# Patient Record
Sex: MALE | Race: WHITE | NOT HISPANIC OR LATINO | Employment: UNEMPLOYED | URBAN - NONMETROPOLITAN AREA
[De-identification: names, ages, dates, MRNs, and addresses within clinical notes are randomized per-mention and may not be internally consistent; named-entity substitution may affect disease eponyms.]

---

## 2022-04-19 ENCOUNTER — HOSPITAL ENCOUNTER (EMERGENCY)
Facility: HOSPITAL | Age: 57
Discharge: HOME | End: 2022-04-19
Attending: EMERGENCY MEDICINE | Admitting: EMERGENCY MEDICINE
Payer: COMMERCIAL

## 2022-04-19 ENCOUNTER — APPOINTMENT (EMERGENCY)
Dept: RADIOLOGY | Facility: HOSPITAL | Age: 57
End: 2022-04-19
Payer: COMMERCIAL

## 2022-04-19 VITALS
WEIGHT: 144.62 LBS | TEMPERATURE: 98 F | RESPIRATION RATE: 21 BRPM | HEART RATE: 84 BPM | DIASTOLIC BLOOD PRESSURE: 67 MMHG | OXYGEN SATURATION: 100 % | SYSTOLIC BLOOD PRESSURE: 92 MMHG | HEIGHT: 66 IN | BODY MASS INDEX: 23.24 KG/M2

## 2022-04-19 DIAGNOSIS — R06.00 DYSPNEA, UNSPECIFIED TYPE: Primary | ICD-10-CM

## 2022-04-19 LAB
ALBUMIN SERPL BCP-MCNC: 3 G/DL (ref 3.5–5)
ALP SERPL-CCNC: 86 U/L (ref 46–116)
ALT SERPL W P-5'-P-CCNC: 42 U/L (ref 12–78)
ANION GAP SERPL CALCULATED.3IONS-SCNC: 12 MMOL/L (ref 4–13)
AST SERPL W P-5'-P-CCNC: 22 U/L (ref 5–45)
BASOPHILS # BLD AUTO: 0.05 THOUSANDS/ΜL (ref 0–0.1)
BASOPHILS NFR BLD AUTO: 1 % (ref 0–1)
BILIRUB SERPL-MCNC: 0.47 MG/DL (ref 0.2–1)
BUN SERPL-MCNC: 14 MG/DL (ref 5–25)
CALCIUM ALBUM COR SERPL-MCNC: 9.8 MG/DL (ref 8.3–10.1)
CALCIUM SERPL-MCNC: 9 MG/DL (ref 8.3–10.1)
CARDIAC TROPONIN I PNL SERPL HS: 2441 NG/L
CHLORIDE SERPL-SCNC: 102 MMOL/L (ref 100–108)
CO2 SERPL-SCNC: 23 MMOL/L (ref 21–32)
CREAT SERPL-MCNC: 0.84 MG/DL (ref 0.6–1.3)
CRP SERPL QL: 42.4 MG/L
EOSINOPHIL # BLD AUTO: 0.1 THOUSAND/ΜL (ref 0–0.61)
EOSINOPHIL NFR BLD AUTO: 1 % (ref 0–6)
ERYTHROCYTE [DISTWIDTH] IN BLOOD BY AUTOMATED COUNT: 13 % (ref 11.6–15.1)
ERYTHROCYTE [SEDIMENTATION RATE] IN BLOOD: 33 MM/HOUR (ref 0–19)
GFR SERPL CREATININE-BSD FRML MDRD: 99 ML/MIN/1.73SQ M
GLUCOSE SERPL-MCNC: 91 MG/DL (ref 65–140)
HCT VFR BLD AUTO: 40.8 % (ref 36.5–49.3)
HGB BLD-MCNC: 12.9 G/DL (ref 12–17)
IMM GRANULOCYTES # BLD AUTO: 0.05 THOUSAND/UL (ref 0–0.2)
IMM GRANULOCYTES NFR BLD AUTO: 1 % (ref 0–2)
LYMPHOCYTES # BLD AUTO: 1.9 THOUSANDS/ΜL (ref 0.6–4.47)
LYMPHOCYTES NFR BLD AUTO: 24 % (ref 14–44)
MCH RBC QN AUTO: 28.2 PG (ref 26.8–34.3)
MCHC RBC AUTO-ENTMCNC: 31.6 G/DL (ref 31.4–37.4)
MCV RBC AUTO: 89 FL (ref 82–98)
MONOCYTES # BLD AUTO: 0.68 THOUSAND/ΜL (ref 0.17–1.22)
MONOCYTES NFR BLD AUTO: 9 % (ref 4–12)
NEUTROPHILS # BLD AUTO: 5.12 THOUSANDS/ΜL (ref 1.85–7.62)
NEUTS SEG NFR BLD AUTO: 64 % (ref 43–75)
NRBC BLD AUTO-RTO: 0 /100 WBCS
NT-PROBNP SERPL-MCNC: 2752 PG/ML
PLATELET # BLD AUTO: 311 THOUSANDS/UL (ref 149–390)
PMV BLD AUTO: 9.7 FL (ref 8.9–12.7)
POTASSIUM SERPL-SCNC: 4.4 MMOL/L (ref 3.5–5.3)
PROT SERPL-MCNC: 7.2 G/DL (ref 6.4–8.2)
RBC # BLD AUTO: 4.58 MILLION/UL (ref 3.88–5.62)
SODIUM SERPL-SCNC: 137 MMOL/L (ref 136–145)
WBC # BLD AUTO: 7.9 THOUSAND/UL (ref 4.31–10.16)

## 2022-04-19 PROCEDURE — 85025 COMPLETE CBC W/AUTO DIFF WBC: CPT | Performed by: EMERGENCY MEDICINE

## 2022-04-19 PROCEDURE — 99285 EMERGENCY DEPT VISIT HI MDM: CPT | Performed by: EMERGENCY MEDICINE

## 2022-04-19 PROCEDURE — 93005 ELECTROCARDIOGRAM TRACING: CPT

## 2022-04-19 PROCEDURE — 99285 EMERGENCY DEPT VISIT HI MDM: CPT

## 2022-04-19 PROCEDURE — 85652 RBC SED RATE AUTOMATED: CPT | Performed by: EMERGENCY MEDICINE

## 2022-04-19 PROCEDURE — 36415 COLL VENOUS BLD VENIPUNCTURE: CPT | Performed by: EMERGENCY MEDICINE

## 2022-04-19 PROCEDURE — 84484 ASSAY OF TROPONIN QUANT: CPT | Performed by: EMERGENCY MEDICINE

## 2022-04-19 PROCEDURE — 83880 ASSAY OF NATRIURETIC PEPTIDE: CPT | Performed by: EMERGENCY MEDICINE

## 2022-04-19 PROCEDURE — 86140 C-REACTIVE PROTEIN: CPT | Performed by: EMERGENCY MEDICINE

## 2022-04-19 PROCEDURE — 80053 COMPREHEN METABOLIC PANEL: CPT | Performed by: EMERGENCY MEDICINE

## 2022-04-19 PROCEDURE — 71045 X-RAY EXAM CHEST 1 VIEW: CPT

## 2022-04-19 RX ORDER — ATORVASTATIN CALCIUM 80 MG/1
80 TABLET, FILM COATED ORAL DAILY
Status: ON HOLD | COMMUNITY
Start: 2022-04-12

## 2022-04-19 RX ORDER — ERGOCALCIFEROL (VITAMIN D2) 1250 MCG
50000 CAPSULE ORAL WEEKLY
COMMUNITY
Start: 2022-04-17 | End: 2022-08-06

## 2022-04-19 RX ORDER — METOPROLOL SUCCINATE 25 MG/1
25 TABLET, EXTENDED RELEASE ORAL DAILY
Status: ON HOLD | COMMUNITY
Start: 2022-04-13

## 2022-04-19 RX ORDER — CLOPIDOGREL BISULFATE 75 MG/1
75 TABLET ORAL DAILY
Status: ON HOLD | COMMUNITY
Start: 2022-04-13

## 2022-04-19 RX ORDER — WARFARIN SODIUM 5 MG/1
5 TABLET ORAL DAILY
Status: ON HOLD | COMMUNITY
Start: 2022-04-12

## 2022-04-19 NOTE — QUICK NOTE
Pt evaluated at the bedside  Labs stable  Renal function stable  No events on telemetry  Symptoms improved and BP slightly improved since seen in clinic today  CXR without significant pleural effusions and improved since most recent hospitalization  Will plan for discharge with set up for OP R[facility]  If he worsens in any way he is aware to seek medical attention immediately  Case discussed with Dr Ibeth Pimentel who agreed  ED doctor aware      Yonis Ott PA-C  4/19/2022

## 2022-04-19 NOTE — ED PROVIDER NOTES
History  Chief Complaint   Patient presents with    Shortness of Breath     pt c/o ongoing sob for past 9 days since 2 stent placement  pt seen cardiologist and instructed to come to ED per further evaluation due to ekg done in office today  denies c/p, cough, fevers, n/v/d     Sent by Cardiology for evaluation of possible pericarditis  Patient had STEMI on 04/10 following which she had PCI of LAD and the left circumflex  Patient has EF of 25% and is on Coumadin  Patient continues to complain shortness of breath, no different than when he left the hospital after his STEMI  He does not complain of chest pain  He does not complain of shortness of breath at rest   However he does complain of shortness of breath with exertion  History provided by:  Patient   used: No    Shortness of Breath  Severity:  Moderate  Onset quality:  Gradual  Timing:  Constant  Progression:  Unchanged  Chronicity:  New  Context comment:  Recent MI and stenting  Relieved by:  Nothing  Worsened by:  Nothing  Ineffective treatments:  None tried  Associated symptoms: no abdominal pain, no chest pain, no claudication, no cough, no ear pain, no fever, no headaches, no hemoptysis, no neck pain, no rash, no sore throat, no sputum production, no vomiting and no wheezing        Prior to Admission Medications   Prescriptions Last Dose Informant Patient Reported?  Taking?   atorvastatin (LIPITOR) 80 mg tablet 4/18/2022 at Unknown time  Yes Yes   Sig: Take 80 mg by mouth daily   clopidogrel (PLAVIX) 75 mg tablet 4/19/2022 at Unknown time  Yes Yes   Sig: Take 75 mg by mouth daily   ergocalciferol (ERGOCALCIFEROL) 1 25 MG (08165 UT) capsule Past Week at Unknown time  Yes Yes   Sig: Take 50,000 Units by mouth once a week   metoprolol succinate (TOPROL-XL) 25 mg 24 hr tablet 4/19/2022 at Unknown time  Yes Yes   Sig: Take 25 mg by mouth daily   warfarin (COUMADIN) 5 mg tablet 4/18/2022 at Unknown time  Yes Yes   Sig: Take 5 mg by mouth daily      Facility-Administered Medications: None       Past Medical History:   Diagnosis Date    Coronary artery disease        Past Surgical History:   Procedure Laterality Date    CORONARY ANGIOPLASTY WITH STENT PLACEMENT  04/10/2022    x 2       History reviewed  No pertinent family history  I have reviewed and agree with the history as documented  E-Cigarette/Vaping    E-Cigarette Use Never User      E-Cigarette/Vaping Substances     Social History     Tobacco Use    Smoking status: Former Smoker     Types: Cigarettes     Quit date: 4/16/2022    Smokeless tobacco: Never Used   Vaping Use    Vaping Use: Never used   Substance Use Topics    Alcohol use: Not Currently    Drug use: Not Currently       Review of Systems   Constitutional: Negative for chills and fever  HENT: Negative for ear pain, hearing loss, sore throat, trouble swallowing and voice change  Eyes: Negative for pain and discharge  Respiratory: Positive for shortness of breath  Negative for cough, hemoptysis, sputum production and wheezing  Cardiovascular: Negative for chest pain, palpitations and claudication  Gastrointestinal: Negative for abdominal pain, blood in stool, constipation, diarrhea, nausea and vomiting  Genitourinary: Negative for dysuria, flank pain, frequency and hematuria  Musculoskeletal: Negative for joint swelling, neck pain and neck stiffness  Skin: Negative for rash and wound  Neurological: Negative for dizziness, seizures, syncope, facial asymmetry and headaches  Psychiatric/Behavioral: Negative for hallucinations, self-injury and suicidal ideas  All other systems reviewed and are negative  Physical Exam  Physical Exam  Vitals and nursing note reviewed  Constitutional:       General: He is not in acute distress  Appearance: He is well-developed  HENT:      Head: Normocephalic and atraumatic        Right Ear: External ear normal       Left Ear: External ear normal    Eyes: General: No scleral icterus  Right eye: No discharge  Left eye: No discharge  Extraocular Movements: Extraocular movements intact  Conjunctiva/sclera: Conjunctivae normal    Cardiovascular:      Rate and Rhythm: Normal rate and regular rhythm  Heart sounds: Normal heart sounds  No murmur heard  Pulmonary:      Effort: Pulmonary effort is normal       Breath sounds: Normal breath sounds  No decreased breath sounds, wheezing, rhonchi or rales  Abdominal:      General: Bowel sounds are normal  There is no distension  Palpations: Abdomen is soft  Tenderness: There is no abdominal tenderness  There is no guarding or rebound  Musculoskeletal:         General: No deformity  Normal range of motion  Cervical back: Normal range of motion and neck supple  Skin:     General: Skin is warm and dry  Findings: No rash  Neurological:      General: No focal deficit present  Mental Status: He is alert and oriented to person, place, and time  Cranial Nerves: No cranial nerve deficit  Psychiatric:         Mood and Affect: Mood normal          Behavior: Behavior normal          Thought Content:  Thought content normal          Judgment: Judgment normal          Vital Signs  ED Triage Vitals [04/19/22 1346]   Temperature Pulse Respirations Blood Pressure SpO2   98 °F (36 7 °C) 81 17 109/78 98 %      Temp Source Heart Rate Source Patient Position - Orthostatic VS BP Location FiO2 (%)   Temporal Monitor Lying Left arm --      Pain Score       No Pain           Vitals:    04/19/22 1500 04/19/22 1530 04/19/22 1545 04/19/22 1600   BP: 95/59 93/65 (!) 85/64 92/67   Pulse: 84 82 82 84   Patient Position - Orthostatic VS:             Visual Acuity      ED Medications  Medications - No data to display    Diagnostic Studies  Results Reviewed     Procedure Component Value Units Date/Time    HS Troponin I 4hr [[id number]] Collected: 04/19/22 1618    Lab Status: No result Specimen: Blood from Arm, Right     HS Troponin 0hr (reflex protocol) [[id number]]  (Abnormal) Collected: 04/19/22 1355    Lab Status: Final result Specimen: Blood from Arm, Right Updated: 04/19/22 1424     hs TnI 0hr 2,441 ng/L     HS Troponin I 2hr [[id number]]     Lab Status: No result Specimen: Blood     Comprehensive metabolic panel [[id number]]  (Abnormal) Collected: 04/19/22 1355    Lab Status: Final result Specimen: Blood from Arm, Right Updated: 04/19/22 1423     Sodium 137 mmol/L      Potassium 4 4 mmol/L      Chloride 102 mmol/L      CO2 23 mmol/L      ANION GAP 12 mmol/L      BUN 14 mg/dL      Creatinine 0 84 mg/dL      Glucose 91 mg/dL      Calcium 9 0 mg/dL      Corrected Calcium 9 8 mg/dL      AST 22 U/L      ALT 42 U/L      Alkaline Phosphatase 86 U/L      Total Protein 7 2 g/dL      Albumin 3 0 g/dL      Total Bilirubin 0 47 mg/dL      eGFR 99 ml/min/1 73sq m     Narrative:      [facility] guidelines for Chronic Kidney Disease (CKD):     Stage 1 with normal or high GFR (GFR > 90 mL/min/1 73 square meters)    Stage 2 Mild CKD (GFR = 60-89 mL/min/1 73 square meters)    Stage 3A Moderate CKD (GFR = 45-59 mL/min/1 73 square meters)    Stage 3B Moderate CKD (GFR = 30-44 mL/min/1 73 square meters)    Stage 4 Severe CKD (GFR = 15-29 mL/min/1 73 square meters)    Stage 5 End Stage CKD (GFR <15 mL/min/1 73 square meters)  Note: GFR calculation is accurate only with a steady state creatinine    C-reactive protein [[id number]]  (Abnormal) Collected: 04/19/22 1355    Lab Status: Final result Specimen: Blood from Arm, Right Updated: 04/19/22 1423     CRP 42 4 mg/L     Narrative:      Note: Unit of Measure change to mg/L at [facility] will show at 10 fold increase in CRP result to match network standards      NT-BNP PRO [[id number]]  (Abnormal) Collected: 04/19/22 1355    Lab Status: Final result Specimen: Blood from Arm, Right Updated: 04/19/22 1423     NT-proBNP 2,752 pg/mL Sedimentation rate, automated [[id number]]  (Abnormal) Collected: 04/19/22 1355    Lab Status: Final result Specimen: Blood from Arm, Right Updated: 04/19/22 1418     Sed Rate 33 mm/hour     CBC and differential [[id number]] Collected: 04/19/22 1355    Lab Status: Final result Specimen: Blood from Arm, Right Updated: 04/19/22 1401     WBC 7 90 Thousand/uL      RBC 4 58 Million/uL      Hemoglobin 12 9 g/dL      Hematocrit 40 8 %      MCV 89 fL      MCH 28 2 pg      MCHC 31 6 g/dL      RDW 13 0 %      MPV 9 7 fL      Platelets 202 Thousands/uL      nRBC 0 /100 WBCs      Neutrophils Relative 64 %      Immat GRANS % 1 %      Lymphocytes Relative 24 %      Monocytes Relative 9 %      Eosinophils Relative 1 %      Basophils Relative 1 %      Neutrophils Absolute 5 12 Thousands/µL      Immature Grans Absolute 0 05 Thousand/uL      Lymphocytes Absolute 1 90 Thousands/µL      Monocytes Absolute 0 68 Thousand/µL      Eosinophils Absolute 0 10 Thousand/µL      Basophils Absolute 0 05 Thousands/µL                  XR chest 1 view portable    (Results Pending)              Procedures  ECG 12 Lead Documentation Only    Date/Time: 4/19/2022 1:46 PM  Performed by: Dany Gilbert MD  Authorized by: Dany Gilbert MD     ECG reviewed by me, the ED Provider: yes    Patient location:  ED  Previous ECG:     Previous ECG:  Compared to current    Similarity:  No change  Interpretation:     Interpretation: abnormal    Rate:     ECG rate:  84    ECG rate assessment: normal    Rhythm:     Rhythm: sinus rhythm    Ectopy:     Ectopy: none    QRS:     QRS axis:  Normal    QRS intervals:  Normal  Conduction:     Conduction: normal    ST segments:     ST segments:  Abnormal    Elevation:  V3, V4, V5 and V6  T waves:     T waves: normal               ED Course  ED Course as of 04/19/22 1618   Tue Apr 19, 2022   1429 Discussed with savanah Burt, will come to see pt in ED prior to dispo decision   1617 Seen in the ED by Cardiology, feels stable for discharge and outpatient right heart catheterization  They will set up appointment for same  SBIRT 22yo+      Most Recent Value   SBIRT (22 yo +)    In order to provide better care to our patients, we are screening all of our patients for alcohol and drug use  Would it be okay to ask you these screening questions? Yes Filed at: 04/19/2022 1445   Initial Alcohol Screen: US AUDIT-C     1  How often do you have a drink containing alcohol? 0 Filed at: 04/19/2022 1445   2  How many drinks containing alcohol do you have on a typical day you are drinking? 0 Filed at: 04/19/2022 1445   3a  Male UNDER 65: How often do you have five or more drinks on one occasion? 0 Filed at: 04/19/2022 1445   3b  FEMALE Any Age, or MALE 65+: How often do you have 4 or more drinks on one occassion? 0 Filed at: 04/19/2022 1445   Audit-C Score 0 Filed at: 04/19/2022 1445   PRIYA: How many times in the past year have you    Used an illegal drug or used a prescription medication for non-medical reasons?  Never Filed at: 04/19/2022 1445                    MDM  Number of Diagnoses or Management Options     Amount and/or Complexity of Data Reviewed  Clinical lab tests: ordered and reviewed  Tests in the radiology section of CPT®: reviewed and ordered  Decide to obtain previous medical records or to obtain history from someone other than the patient: yes  Review and summarize past medical records: yes  Independent visualization of images, tracings, or specimens: yes        Disposition  Final diagnoses:   Dyspnea, unspecified type     Time reflects when diagnosis was documented in both MDM as applicable and the Disposition within this note     Time User Action Codes Description Comment    4/19/2022  4:18 PM Marie Stinson Add [R06 00] Dyspnea, unspecified type       ED Disposition     ED Disposition Condition Date/Time Comment    Discharge Stable Tue Apr 19, 2022  4:18 PM Ezekiel Muñiz discharge to home/self care  Follow-up Information     Follow up With Specialties Details Why Contact Info    Ricardo Bernrad PA-C Physician Assistant  As needed [facility]  [phone]            Patient's Medications   Discharge Prescriptions    No medications on file       No discharge procedures on file      PDMP Review     None          ED Provider  Electronically Signed by           Lorenza Quintero MD  04/19/22 1116

## 2022-04-20 LAB
ATRIAL RATE: 84 BPM
P AXIS: 48 DEGREES
PR INTERVAL: 140 MS
QRS AXIS: 47 DEGREES
QRSD INTERVAL: 128 MS
QT INTERVAL: 394 MS
QTC INTERVAL: 465 MS
T WAVE AXIS: 118 DEGREES
VENTRICULAR RATE: 84 BPM

## 2022-05-31 ENCOUNTER — APPOINTMENT (EMERGENCY)
Dept: CT IMAGING | Facility: HOSPITAL | Age: 57
End: 2022-05-31
Payer: COMMERCIAL

## 2022-05-31 ENCOUNTER — APPOINTMENT (EMERGENCY)
Dept: RADIOLOGY | Facility: HOSPITAL | Age: 57
End: 2022-05-31
Payer: COMMERCIAL

## 2022-05-31 ENCOUNTER — HOSPITAL ENCOUNTER (EMERGENCY)
Facility: HOSPITAL | Age: 57
Discharge: HOME | End: 2022-06-01
Attending: STUDENT IN AN ORGANIZED HEALTH CARE EDUCATION/TRAINING PROGRAM | Admitting: STUDENT IN AN ORGANIZED HEALTH CARE EDUCATION/TRAINING PROGRAM
Payer: COMMERCIAL

## 2022-05-31 VITALS
HEIGHT: 66 IN | DIASTOLIC BLOOD PRESSURE: 78 MMHG | TEMPERATURE: 97.5 F | HEART RATE: 113 BPM | OXYGEN SATURATION: 99 % | WEIGHT: 152.56 LBS | SYSTOLIC BLOOD PRESSURE: 119 MMHG | RESPIRATION RATE: 22 BRPM | BODY MASS INDEX: 24.52 KG/M2

## 2022-05-31 DIAGNOSIS — J12.82 PNEUMONIA DUE TO COVID-19 VIRUS: ICD-10-CM

## 2022-05-31 DIAGNOSIS — U07.1 PNEUMONIA DUE TO COVID-19 VIRUS: ICD-10-CM

## 2022-05-31 DIAGNOSIS — I50.23 ACUTE ON CHRONIC SYSTOLIC HEART FAILURE (HCC): Primary | ICD-10-CM

## 2022-05-31 LAB
ALBUMIN SERPL BCP-MCNC: 2.9 G/DL (ref 3.5–5)
ALP SERPL-CCNC: 101 U/L (ref 46–116)
ALT SERPL W P-5'-P-CCNC: 27 U/L (ref 12–78)
ANION GAP SERPL CALCULATED.3IONS-SCNC: 11 MMOL/L (ref 4–13)
APTT PPP: 49 SECONDS (ref 23–37)
AST SERPL W P-5'-P-CCNC: 17 U/L (ref 5–45)
BASOPHILS # BLD AUTO: 0.04 THOUSANDS/ΜL (ref 0–0.1)
BASOPHILS NFR BLD AUTO: 1 % (ref 0–1)
BILIRUB SERPL-MCNC: 0.49 MG/DL (ref 0.2–1)
BUN SERPL-MCNC: 13 MG/DL (ref 5–25)
CALCIUM ALBUM COR SERPL-MCNC: 9.6 MG/DL (ref 8.3–10.1)
CALCIUM SERPL-MCNC: 8.7 MG/DL (ref 8.3–10.1)
CARDIAC TROPONIN I PNL SERPL HS: 23 NG/L
CHLORIDE SERPL-SCNC: 104 MMOL/L (ref 100–108)
CO2 SERPL-SCNC: 21 MMOL/L (ref 21–32)
CREAT SERPL-MCNC: 0.87 MG/DL (ref 0.6–1.3)
D DIMER PPP FEU-MCNC: 2.12 UG/ML FEU
EOSINOPHIL # BLD AUTO: 0.07 THOUSAND/ΜL (ref 0–0.61)
EOSINOPHIL NFR BLD AUTO: 1 % (ref 0–6)
ERYTHROCYTE [DISTWIDTH] IN BLOOD BY AUTOMATED COUNT: 14.6 % (ref 11.6–15.1)
FLUAV RNA RESP QL NAA+PROBE: NEGATIVE
FLUBV RNA RESP QL NAA+PROBE: NEGATIVE
GFR SERPL CREATININE-BSD FRML MDRD: 97 ML/MIN/1.73SQ M
GLUCOSE SERPL-MCNC: 103 MG/DL (ref 65–140)
HCT VFR BLD AUTO: 32.7 % (ref 36.5–49.3)
HGB BLD-MCNC: 10.7 G/DL (ref 12–17)
IMM GRANULOCYTES # BLD AUTO: 0.02 THOUSAND/UL (ref 0–0.2)
IMM GRANULOCYTES NFR BLD AUTO: 0 % (ref 0–2)
INR PPP: 2.68 (ref 0.84–1.19)
LYMPHOCYTES # BLD AUTO: 2.28 THOUSANDS/ΜL (ref 0.6–4.47)
LYMPHOCYTES NFR BLD AUTO: 33 % (ref 14–44)
MCH RBC QN AUTO: 27.9 PG (ref 26.8–34.3)
MCHC RBC AUTO-ENTMCNC: 32.7 G/DL (ref 31.4–37.4)
MCV RBC AUTO: 85 FL (ref 82–98)
MONOCYTES # BLD AUTO: 0.48 THOUSAND/ΜL (ref 0.17–1.22)
MONOCYTES NFR BLD AUTO: 7 % (ref 4–12)
NEUTROPHILS # BLD AUTO: 4.09 THOUSANDS/ΜL (ref 1.85–7.62)
NEUTS SEG NFR BLD AUTO: 58 % (ref 43–75)
NRBC BLD AUTO-RTO: 0 /100 WBCS
NT-PROBNP SERPL-MCNC: 3002 PG/ML
PLATELET # BLD AUTO: 212 THOUSANDS/UL (ref 149–390)
PMV BLD AUTO: 10.4 FL (ref 8.9–12.7)
POTASSIUM SERPL-SCNC: 3.4 MMOL/L (ref 3.5–5.3)
PROT SERPL-MCNC: 6.4 G/DL (ref 6.4–8.2)
PROTHROMBIN TIME: 27.8 SECONDS (ref 11.6–14.5)
RBC # BLD AUTO: 3.84 MILLION/UL (ref 3.88–5.62)
RSV RNA RESP QL NAA+PROBE: NEGATIVE
SARS-COV-2 RNA RESP QL NAA+PROBE: POSITIVE
SODIUM SERPL-SCNC: 136 MMOL/L (ref 136–145)
WBC # BLD AUTO: 6.98 THOUSAND/UL (ref 4.31–10.16)

## 2022-05-31 PROCEDURE — 99285 EMERGENCY DEPT VISIT HI MDM: CPT | Performed by: STUDENT IN AN ORGANIZED HEALTH CARE EDUCATION/TRAINING PROGRAM

## 2022-05-31 PROCEDURE — 36415 COLL VENOUS BLD VENIPUNCTURE: CPT

## 2022-05-31 PROCEDURE — 84484 ASSAY OF TROPONIN QUANT: CPT | Performed by: STUDENT IN AN ORGANIZED HEALTH CARE EDUCATION/TRAINING PROGRAM

## 2022-05-31 PROCEDURE — 85025 COMPLETE CBC W/AUTO DIFF WBC: CPT | Performed by: STUDENT IN AN ORGANIZED HEALTH CARE EDUCATION/TRAINING PROGRAM

## 2022-05-31 PROCEDURE — G1004 CDSM NDSC: HCPCS

## 2022-05-31 PROCEDURE — 85730 THROMBOPLASTIN TIME PARTIAL: CPT | Performed by: STUDENT IN AN ORGANIZED HEALTH CARE EDUCATION/TRAINING PROGRAM

## 2022-05-31 PROCEDURE — 0241U HB NFCT DS VIR RESP RNA 4 TRGT: CPT | Performed by: STUDENT IN AN ORGANIZED HEALTH CARE EDUCATION/TRAINING PROGRAM

## 2022-05-31 PROCEDURE — 85379 FIBRIN DEGRADATION QUANT: CPT

## 2022-05-31 PROCEDURE — 71045 X-RAY EXAM CHEST 1 VIEW: CPT

## 2022-05-31 PROCEDURE — 71275 CT ANGIOGRAPHY CHEST: CPT

## 2022-05-31 PROCEDURE — 99285 EMERGENCY DEPT VISIT HI MDM: CPT

## 2022-05-31 PROCEDURE — 83880 ASSAY OF NATRIURETIC PEPTIDE: CPT | Performed by: STUDENT IN AN ORGANIZED HEALTH CARE EDUCATION/TRAINING PROGRAM

## 2022-05-31 PROCEDURE — 80053 COMPREHEN METABOLIC PANEL: CPT | Performed by: STUDENT IN AN ORGANIZED HEALTH CARE EDUCATION/TRAINING PROGRAM

## 2022-05-31 PROCEDURE — 85610 PROTHROMBIN TIME: CPT | Performed by: STUDENT IN AN ORGANIZED HEALTH CARE EDUCATION/TRAINING PROGRAM

## 2022-05-31 RX ADMIN — IOHEXOL 70 ML: 350 INJECTION, SOLUTION INTRAVENOUS at 23:36

## 2022-06-01 PROCEDURE — 96375 TX/PRO/DX INJ NEW DRUG ADDON: CPT

## 2022-06-01 PROCEDURE — 96374 THER/PROPH/DIAG INJ IV PUSH: CPT

## 2022-06-01 RX ORDER — FUROSEMIDE 10 MG/ML
20 INJECTION INTRAMUSCULAR; INTRAVENOUS ONCE
Status: COMPLETED | OUTPATIENT
Start: 2022-06-01 | End: 2022-06-01

## 2022-06-01 RX ORDER — BENZONATATE 100 MG/1
100 CAPSULE ORAL EVERY 8 HOURS
Qty: 21 CAPSULE | Refills: 0 | Status: ON HOLD | OUTPATIENT
Start: 2022-06-01 | End: 2022-08-06

## 2022-06-01 RX ORDER — BENZONATATE 100 MG/1
100 CAPSULE ORAL ONCE
Status: COMPLETED | OUTPATIENT
Start: 2022-06-01 | End: 2022-06-01

## 2022-06-01 RX ORDER — FENTANYL CITRATE 50 UG/ML
50 INJECTION, SOLUTION INTRAMUSCULAR; INTRAVENOUS ONCE
Status: COMPLETED | OUTPATIENT
Start: 2022-06-01 | End: 2022-06-01

## 2022-06-01 RX ORDER — POTASSIUM CHLORIDE 20 MEQ/1
20 TABLET, EXTENDED RELEASE ORAL 2 TIMES DAILY
Qty: 10 TABLET | Refills: 0 | Status: ON HOLD | OUTPATIENT
Start: 2022-06-01

## 2022-06-01 RX ORDER — FUROSEMIDE 40 MG/1
40 TABLET ORAL DAILY
Qty: 5 TABLET | Refills: 0 | Status: SHIPPED | OUTPATIENT
Start: 2022-06-01 | End: 2022-08-06

## 2022-06-01 RX ADMIN — FUROSEMIDE 20 MG: 10 INJECTION, SOLUTION INTRAMUSCULAR; INTRAVENOUS at 01:00

## 2022-06-01 RX ADMIN — FENTANYL CITRATE 50 MCG: 0.05 INJECTION, SOLUTION INTRAMUSCULAR; INTRAVENOUS at 01:06

## 2022-06-01 RX ADMIN — BENZONATATE 100 MG: 100 CAPSULE ORAL at 01:00

## 2022-06-01 NOTE — DISCHARGE INSTRUCTIONS
You were evaluated in the emergency department for shortness of breath  There is evidence of pulmonary edema and COVID pneumonia noted on the CT scan  You are being prescribed Lasix 40 mg daily along with supplemental potassium  Please take as directed and follow-up with your cardiologist this coming Thursday  For cough, you are being prescribed Tessalon Perles  Do not hesitate to be re-evaluated in the ED for any concerning signs or symptoms

## 2022-06-22 DIAGNOSIS — I50.23 ACUTE ON CHRONIC SYSTOLIC (CONGESTIVE) HEART FAILURE (HCC): ICD-10-CM

## 2022-06-22 DIAGNOSIS — I25.5 ISCHEMIC CARDIOMYOPATHY: ICD-10-CM

## 2022-06-22 DIAGNOSIS — I25.10 ATHEROSCLEROTIC HEART DISEASE OF NATIVE CORONARY ARTERY WITHOUT ANGINA PECTORIS: ICD-10-CM

## 2022-06-22 DIAGNOSIS — I10 ESSENTIAL (PRIMARY) HYPERTENSION: ICD-10-CM

## 2022-06-22 DIAGNOSIS — I73.9 PERIPHERAL VASCULAR DISEASE, UNSPECIFIED (HCC): ICD-10-CM

## 2022-08-06 ENCOUNTER — APPOINTMENT (EMERGENCY)
Dept: CT IMAGING | Facility: HOSPITAL | Age: 57
DRG: 194 | End: 2022-08-06
Payer: COMMERCIAL

## 2022-08-06 ENCOUNTER — HOSPITAL ENCOUNTER (INPATIENT)
Facility: HOSPITAL | Age: 57
LOS: 5 days | Discharge: HOME | DRG: 194 | End: 2022-08-11
Attending: STUDENT IN AN ORGANIZED HEALTH CARE EDUCATION/TRAINING PROGRAM | Admitting: FAMILY MEDICINE
Payer: COMMERCIAL

## 2022-08-06 ENCOUNTER — APPOINTMENT (EMERGENCY)
Dept: RADIOLOGY | Facility: HOSPITAL | Age: 57
DRG: 194 | End: 2022-08-06
Payer: COMMERCIAL

## 2022-08-06 DIAGNOSIS — I73.9 PAD (PERIPHERAL ARTERY DISEASE) (HCC): ICD-10-CM

## 2022-08-06 DIAGNOSIS — Z72.0 TOBACCO ABUSE: ICD-10-CM

## 2022-08-06 DIAGNOSIS — R04.2 HEMOPTYSIS: ICD-10-CM

## 2022-08-06 DIAGNOSIS — J18.9 PNEUMONIA: ICD-10-CM

## 2022-08-06 DIAGNOSIS — R06.00 DYSPNEA: Primary | ICD-10-CM

## 2022-08-06 DIAGNOSIS — I50.9 ACUTE ON CHRONIC CONGESTIVE HEART FAILURE (HCC): ICD-10-CM

## 2022-08-06 DIAGNOSIS — I50.23 ACUTE ON CHRONIC SYSTOLIC CHF (CONGESTIVE HEART FAILURE) (HCC): ICD-10-CM

## 2022-08-06 DIAGNOSIS — G47.30 SLEEP APNEA, UNSPECIFIED TYPE: ICD-10-CM

## 2022-08-06 DIAGNOSIS — I73.9 ARTERIAL INSUFFICIENCY OF LOWER EXTREMITY (HCC): ICD-10-CM

## 2022-08-06 DIAGNOSIS — J90 PLEURAL EFFUSION, RIGHT: ICD-10-CM

## 2022-08-06 DIAGNOSIS — J90 PLEURAL EFFUSION ON RIGHT: ICD-10-CM

## 2022-08-06 DIAGNOSIS — F41.9 ANXIETY: ICD-10-CM

## 2022-08-06 PROBLEM — R06.09 DYSPNEA ON EXERTION: Status: ACTIVE | Noted: 2022-08-06

## 2022-08-06 PROBLEM — R94.31 PROLONGED Q-T INTERVAL ON ECG: Status: ACTIVE | Noted: 2022-08-06

## 2022-08-06 PROBLEM — Z86.16 HISTORY OF COVID-19: Status: ACTIVE | Noted: 2022-08-06

## 2022-08-06 PROBLEM — Z79.01 CHRONIC ANTICOAGULATION: Status: ACTIVE | Noted: 2022-08-06

## 2022-08-06 LAB
2HR DELTA HS TROPONIN: 0 NG/L
4HR DELTA HS TROPONIN: 0 NG/L
ALBUMIN SERPL BCP-MCNC: 3 G/DL (ref 3.5–5)
ALP SERPL-CCNC: 304 U/L (ref 46–116)
ALT SERPL W P-5'-P-CCNC: 66 U/L (ref 12–78)
ANION GAP SERPL CALCULATED.3IONS-SCNC: 13 MMOL/L (ref 4–13)
AST SERPL W P-5'-P-CCNC: 35 U/L (ref 5–45)
BASOPHILS # BLD AUTO: 0.07 THOUSANDS/ΜL (ref 0–0.1)
BASOPHILS NFR BLD AUTO: 1 % (ref 0–1)
BILIRUB SERPL-MCNC: 0.89 MG/DL (ref 0.2–1)
BUN SERPL-MCNC: 17 MG/DL (ref 5–25)
CALCIUM ALBUM COR SERPL-MCNC: 9.8 MG/DL (ref 8.3–10.1)
CALCIUM SERPL-MCNC: 9 MG/DL (ref 8.3–10.1)
CARDIAC TROPONIN I PNL SERPL HS: 15 NG/L
CHLORIDE SERPL-SCNC: 100 MMOL/L (ref 96–108)
CO2 SERPL-SCNC: 24 MMOL/L (ref 21–32)
CREAT SERPL-MCNC: 0.96 MG/DL (ref 0.6–1.3)
EOSINOPHIL # BLD AUTO: 0.06 THOUSAND/ΜL (ref 0–0.61)
EOSINOPHIL NFR BLD AUTO: 1 % (ref 0–6)
ERYTHROCYTE [DISTWIDTH] IN BLOOD BY AUTOMATED COUNT: 18 % (ref 11.6–15.1)
GFR SERPL CREATININE-BSD FRML MDRD: 88 ML/MIN/1.73SQ M
GLUCOSE SERPL-MCNC: 118 MG/DL (ref 65–140)
HCT VFR BLD AUTO: 38.8 % (ref 36.5–49.3)
HGB BLD-MCNC: 11.1 G/DL (ref 12–17)
IMM GRANULOCYTES # BLD AUTO: 0.03 THOUSAND/UL (ref 0–0.2)
IMM GRANULOCYTES NFR BLD AUTO: 0 % (ref 0–2)
INR PPP: 2.91 (ref 0.84–1.19)
L PNEUMO1 AG UR QL IA.RAPID: NEGATIVE
LYMPHOCYTES # BLD AUTO: 1.28 THOUSANDS/ΜL (ref 0.6–4.47)
LYMPHOCYTES NFR BLD AUTO: 18 % (ref 14–44)
MAGNESIUM SERPL-MCNC: 2 MG/DL (ref 1.6–2.6)
MCH RBC QN AUTO: 23.9 PG (ref 26.8–34.3)
MCHC RBC AUTO-ENTMCNC: 28.6 G/DL (ref 31.4–37.4)
MCV RBC AUTO: 83 FL (ref 82–98)
MONOCYTES # BLD AUTO: 0.52 THOUSAND/ΜL (ref 0.17–1.22)
MONOCYTES NFR BLD AUTO: 7 % (ref 4–12)
NEUTROPHILS # BLD AUTO: 5.18 THOUSANDS/ΜL (ref 1.85–7.62)
NEUTS SEG NFR BLD AUTO: 73 % (ref 43–75)
NRBC BLD AUTO-RTO: 0 /100 WBCS
NT-PROBNP SERPL-MCNC: 2971 PG/ML
PLATELET # BLD AUTO: 309 THOUSANDS/UL (ref 149–390)
PMV BLD AUTO: 9.1 FL (ref 8.9–12.7)
POTASSIUM SERPL-SCNC: 3.6 MMOL/L (ref 3.5–5.3)
PROCALCITONIN SERPL-MCNC: 0.13 NG/ML
PROT SERPL-MCNC: 6.6 G/DL (ref 6.4–8.4)
PROTHROMBIN TIME: 30.4 SECONDS (ref 11.6–14.5)
RBC # BLD AUTO: 4.65 MILLION/UL (ref 3.88–5.62)
S PNEUM AG UR QL: NEGATIVE
SODIUM SERPL-SCNC: 137 MMOL/L (ref 135–147)
TSH SERPL DL<=0.05 MIU/L-ACNC: 2.24 UIU/ML (ref 0.45–4.5)
WBC # BLD AUTO: 7.14 THOUSAND/UL (ref 4.31–10.16)

## 2022-08-06 PROCEDURE — 85610 PROTHROMBIN TIME: CPT | Performed by: EMERGENCY MEDICINE

## 2022-08-06 PROCEDURE — 36415 COLL VENOUS BLD VENIPUNCTURE: CPT | Performed by: EMERGENCY MEDICINE

## 2022-08-06 PROCEDURE — 93005 ELECTROCARDIOGRAM TRACING: CPT

## 2022-08-06 PROCEDURE — 83880 ASSAY OF NATRIURETIC PEPTIDE: CPT | Performed by: EMERGENCY MEDICINE

## 2022-08-06 PROCEDURE — 96365 THER/PROPH/DIAG IV INF INIT: CPT

## 2022-08-06 PROCEDURE — 83735 ASSAY OF MAGNESIUM: CPT | Performed by: FAMILY MEDICINE

## 2022-08-06 PROCEDURE — 84484 ASSAY OF TROPONIN QUANT: CPT | Performed by: FAMILY MEDICINE

## 2022-08-06 PROCEDURE — 71045 X-RAY EXAM CHEST 1 VIEW: CPT

## 2022-08-06 PROCEDURE — 85025 COMPLETE CBC W/AUTO DIFF WBC: CPT | Performed by: EMERGENCY MEDICINE

## 2022-08-06 PROCEDURE — 87040 BLOOD CULTURE FOR BACTERIA: CPT | Performed by: EMERGENCY MEDICINE

## 2022-08-06 PROCEDURE — 99285 EMERGENCY DEPT VISIT HI MDM: CPT | Performed by: EMERGENCY MEDICINE

## 2022-08-06 PROCEDURE — 99223 1ST HOSP IP/OBS HIGH 75: CPT | Performed by: FAMILY MEDICINE

## 2022-08-06 PROCEDURE — 80053 COMPREHEN METABOLIC PANEL: CPT | Performed by: EMERGENCY MEDICINE

## 2022-08-06 PROCEDURE — 84484 ASSAY OF TROPONIN QUANT: CPT | Performed by: EMERGENCY MEDICINE

## 2022-08-06 PROCEDURE — 87449 NOS EACH ORGANISM AG IA: CPT | Performed by: FAMILY MEDICINE

## 2022-08-06 PROCEDURE — 71275 CT ANGIOGRAPHY CHEST: CPT

## 2022-08-06 PROCEDURE — 84443 ASSAY THYROID STIM HORMONE: CPT | Performed by: FAMILY MEDICINE

## 2022-08-06 PROCEDURE — 84145 PROCALCITONIN (PCT): CPT | Performed by: FAMILY MEDICINE

## 2022-08-06 PROCEDURE — 96375 TX/PRO/DX INJ NEW DRUG ADDON: CPT

## 2022-08-06 PROCEDURE — 99285 EMERGENCY DEPT VISIT HI MDM: CPT

## 2022-08-06 RX ORDER — DOCUSATE SODIUM 100 MG/1
100 CAPSULE, LIQUID FILLED ORAL 2 TIMES DAILY
Status: DISCONTINUED | OUTPATIENT
Start: 2022-08-06 | End: 2022-08-11 | Stop reason: HOSPADM

## 2022-08-06 RX ORDER — WARFARIN SODIUM 5 MG/1
5 TABLET ORAL DAILY
Status: DISCONTINUED | OUTPATIENT
Start: 2022-08-06 | End: 2022-08-07

## 2022-08-06 RX ORDER — CEFTRIAXONE 1 G/50ML
1000 INJECTION, SOLUTION INTRAVENOUS EVERY 24 HOURS
Status: DISCONTINUED | OUTPATIENT
Start: 2022-08-07 | End: 2022-08-07

## 2022-08-06 RX ORDER — POLYETHYLENE GLYCOL 3350 17 G/17G
17 POWDER, FOR SOLUTION ORAL DAILY
Status: DISCONTINUED | OUTPATIENT
Start: 2022-08-07 | End: 2022-08-11 | Stop reason: HOSPADM

## 2022-08-06 RX ORDER — NICOTINE 21 MG/24HR
1 PATCH, TRANSDERMAL 24 HOURS TRANSDERMAL DAILY
Status: DISCONTINUED | OUTPATIENT
Start: 2022-08-07 | End: 2022-08-11 | Stop reason: HOSPADM

## 2022-08-06 RX ORDER — FUROSEMIDE 40 MG/1
40 TABLET ORAL 2 TIMES DAILY
COMMUNITY
End: 2022-08-11

## 2022-08-06 RX ORDER — CLOPIDOGREL BISULFATE 75 MG/1
75 TABLET ORAL DAILY
Status: DISCONTINUED | OUTPATIENT
Start: 2022-08-07 | End: 2022-08-11 | Stop reason: HOSPADM

## 2022-08-06 RX ORDER — GABAPENTIN 600 MG/1
600 TABLET ORAL DAILY PRN
COMMUNITY

## 2022-08-06 RX ORDER — ACETAMINOPHEN 325 MG/1
650 TABLET ORAL EVERY 4 HOURS PRN
Status: DISCONTINUED | OUTPATIENT
Start: 2022-08-06 | End: 2022-08-11 | Stop reason: HOSPADM

## 2022-08-06 RX ORDER — CEFTRIAXONE 1 G/50ML
1000 INJECTION, SOLUTION INTRAVENOUS ONCE
Status: COMPLETED | OUTPATIENT
Start: 2022-08-06 | End: 2022-08-06

## 2022-08-06 RX ORDER — FUROSEMIDE 10 MG/ML
80 INJECTION INTRAMUSCULAR; INTRAVENOUS
Status: DISCONTINUED | OUTPATIENT
Start: 2022-08-07 | End: 2022-08-08

## 2022-08-06 RX ORDER — ATORVASTATIN CALCIUM 40 MG/1
80 TABLET, FILM COATED ORAL DAILY
Status: DISCONTINUED | OUTPATIENT
Start: 2022-08-07 | End: 2022-08-11 | Stop reason: HOSPADM

## 2022-08-06 RX ORDER — METOPROLOL SUCCINATE 25 MG/1
25 TABLET, EXTENDED RELEASE ORAL DAILY
Status: DISCONTINUED | OUTPATIENT
Start: 2022-08-07 | End: 2022-08-11 | Stop reason: HOSPADM

## 2022-08-06 RX ORDER — POTASSIUM CHLORIDE 20 MEQ/1
20 TABLET, EXTENDED RELEASE ORAL 2 TIMES DAILY
Status: DISCONTINUED | OUTPATIENT
Start: 2022-08-06 | End: 2022-08-08

## 2022-08-06 RX ORDER — TRAZODONE HYDROCHLORIDE 100 MG/1
100 TABLET ORAL ONCE
Status: COMPLETED | OUTPATIENT
Start: 2022-08-06 | End: 2022-08-06

## 2022-08-06 RX ORDER — MORPHINE SULFATE 4 MG/ML
4 INJECTION, SOLUTION INTRAMUSCULAR; INTRAVENOUS ONCE
Status: COMPLETED | OUTPATIENT
Start: 2022-08-06 | End: 2022-08-06

## 2022-08-06 RX ORDER — OXYCODONE HYDROCHLORIDE 5 MG/1
5 TABLET ORAL EVERY 4 HOURS PRN
Status: DISCONTINUED | OUTPATIENT
Start: 2022-08-06 | End: 2022-08-11 | Stop reason: HOSPADM

## 2022-08-06 RX ADMIN — FUROSEMIDE 120 MG: 10 INJECTION, SOLUTION INTRAMUSCULAR; INTRAVENOUS at 14:01

## 2022-08-06 RX ADMIN — TRAZODONE HYDROCHLORIDE 100 MG: 100 TABLET ORAL at 22:41

## 2022-08-06 RX ADMIN — DOCUSATE SODIUM 100 MG: 100 CAPSULE ORAL at 17:00

## 2022-08-06 RX ADMIN — IOHEXOL 60 ML: 350 INJECTION, SOLUTION INTRAVENOUS at 14:34

## 2022-08-06 RX ADMIN — CEFTRIAXONE 1000 MG: 1 INJECTION, SOLUTION INTRAVENOUS at 15:33

## 2022-08-06 RX ADMIN — OXYCODONE HYDROCHLORIDE 5 MG: 5 TABLET ORAL at 19:58

## 2022-08-06 RX ADMIN — WARFARIN SODIUM 5 MG: 5 TABLET ORAL at 17:00

## 2022-08-06 RX ADMIN — MORPHINE SULFATE 4 MG: 4 INJECTION INTRAVENOUS at 14:00

## 2022-08-06 RX ADMIN — POTASSIUM CHLORIDE 20 MEQ: 1500 TABLET, EXTENDED RELEASE ORAL at 17:00

## 2022-08-06 NOTE — ASSESSMENT & PLAN NOTE
Suspected most likely community-acquired pneumonia  Patient also has recent history COVID, diagnosed on 05/31/2022-could be residual effect  Since patient is having dyspnea on exertion, will treat patient empirically with ceftriaxone  Follow-up blood culture, procalcitonin, urine Legionella, pneumonia  Follow respiratory protocol

## 2022-08-06 NOTE — PLAN OF CARE
Problem: Potential for Falls  Goal: Patient will remain free of falls  Description: INTERVENTIONS:  - Educate patient/family on patient safety including physical limitations  - Instruct patient to call for assistance with activity   - Consult OT/PT to assist with strengthening/mobility   - Keep Call bell within reach  - Keep bed low and locked with side rails adjusted as appropriate  - Keep care items and personal belongings within reach  - Initiate and maintain comfort rounds  - Make Fall Risk Sign visible to staff  - Offer Toileting every 2 Hours, in advance of need  - Consider moving patient to room near nurses station  Outcome: Progressing     Problem: PAIN - ADULT  Goal: Verbalizes/displays adequate comfort level or baseline comfort level  Description: Interventions:  - Encourage patient to monitor pain and request assistance  - Assess pain using appropriate pain scale  - Administer analgesics based on type and severity of pain and evaluate response  - Implement non-pharmacological measures as appropriate and evaluate response  - Consider cultural and social influences on pain and pain management  - Notify physician/advanced practitioner if interventions unsuccessful or patient reports new pain  Outcome: Progressing     Problem: INFECTION - ADULT  Goal: Absence or prevention of progression during hospitalization  Description: INTERVENTIONS:  - Assess and monitor for signs and symptoms of infection  - Monitor lab/diagnostic results  - Monitor all insertion sites, i e  indwelling lines, tubes, and drains  - Monitor endotracheal if appropriate and nasal secretions for changes in amount and color  - [place] appropriate cooling/warming therapies per order  - Administer medications as ordered  - Instruct and encourage patient and family to use good hand hygiene technique  - Identify and instruct in appropriate isolation precautions for identified infection/condition  Outcome: Progressing     Problem: DISCHARGE PLANNING  Goal: Discharge to home or other facility with appropriate resources  Description: INTERVENTIONS:  - Identify barriers to discharge w/patient and caregiver  - Arrange for needed discharge resources and transportation as appropriate  - Identify discharge learning needs (meds, wound care, etc )  - Arrange for interpretive services to assist at discharge as needed  - Refer to Case Management Department for coordinating discharge planning if the patient needs post-hospital services based on physician/advanced practitioner order or complex needs related to functional status, cognitive ability, or social support system  Outcome: Progressing     Problem: Knowledge Deficit  Goal: Patient/family/caregiver demonstrates understanding of disease process, treatment plan, medications, and discharge instructions  Description: Complete learning assessment and assess knowledge base    Interventions:  - Provide teaching at level of understanding  - Provide teaching via preferred learning methods  Outcome: Progressing     Problem: CARDIOVASCULAR - ADULT  Goal: Maintains optimal cardiac output and hemodynamic stability  Description: INTERVENTIONS:  - Monitor I/O, vital signs and rhythm  - Monitor for S/S and trends of decreased cardiac output  - Administer and titrate ordered vasoactive medications to optimize hemodynamic stability  - Assess quality of pulses, skin color and temperature  - Assess for signs of decreased coronary artery perfusion  - Instruct patient to report change in severity of symptoms  Outcome: Progressing     Problem: RESPIRATORY - ADULT  Goal: Achieves optimal ventilation and oxygenation  Description: INTERVENTIONS:  - Assess for changes in respiratory status  - Assess for changes in mentation and behavior  - Position to facilitate oxygenation and minimize respiratory effort  - Oxygen administered by appropriate delivery if ordered  - Initiate smoking cessation education as indicated  - Encourage broncho-pulmonary hygiene including cough, deep breathe, Incentive Spirometry  - Assess the need for suctioning and aspirate as needed  - Assess and instruct to report SOB or any respiratory difficulty  - Respiratory Therapy support as indicated  Outcome: Progressing     Problem: METABOLIC, FLUID AND ELECTROLYTES - ADULT  Goal: Electrolytes maintained within normal limits  Description: INTERVENTIONS:  - Monitor labs and assess patient for signs and symptoms of electrolyte imbalances  - Administer electrolyte replacement as ordered  - Monitor response to electrolyte replacements, including repeat lab results as appropriate  - Instruct patient on fluid and nutrition as appropriate  Outcome: Progressing  Goal: Fluid balance maintained  Description: INTERVENTIONS:  - Monitor labs   - Monitor I/O and WT  - Instruct patient on fluid and nutrition as appropriate  - Assess for signs & symptoms of volume excess or deficit  Outcome: Progressing

## 2022-08-06 NOTE — ASSESSMENT & PLAN NOTE
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

## 2022-08-06 NOTE — ASSESSMENT & PLAN NOTE
Moderate to large right pleural effusion  Most likely secondary to CHF, secondary to ischemic cardiomyopathy  Continue aggressive diuresis-monitor intake and output  Follow cardiology recommendation  If does not improve, consider to consulted Critical care for thoracentesis

## 2022-08-06 NOTE — RESPIRATORY THERAPY NOTE
RT Protocol Note  Vinicius Finch 56 y o  male MRN: [id number]  Unit/Bed#: -01 Encounter: [id number]    Assessment    Principal Problem:    Acute on chronic systolic CHF (congestive heart failure) ([facility])  Active Problems:    Dyspnea on exertion    Pneumonia    PAD (peripheral artery disease) ([facility])    History of COVID-19    Chronic anticoagulation    Pleural effusion    Tobacco abuse    Prolonged Q-T interval on ECG      Home Pulmonary Medications:       Past Medical History:   Diagnosis Date    CHF (congestive heart failure) ([facility])     Coronary artery disease     MI, old      Social History     Socioeconomic History    Marital status: Single     Spouse name: None    Number of children: None    Years of education: None    Highest education level: None   Occupational History    None   Tobacco Use    Smoking status: Current Every Day Smoker     Types: Cigarettes    Smokeless tobacco: Never Used   Vaping Use    Vaping Use: Never used   Substance and Sexual Activity    Alcohol use: Yes     Comment: rarely    Drug use: Not Currently    Sexual activity: None   Other Topics Concern    None   Social History Narrative    None     Social Determinants of Health     Financial Resource Strain: Not on file   Food Insecurity: Not on file   Transportation Needs: Not on file   Physical Activity: Not on file   Stress: Not on file   Social Connections: Not on file   Intimate Partner Violence: Not on file   Housing Stability: Not on file       Subjective    Subjective Data: pt c/o SOB    Objective    Physical Exam:   Assessment Type: Assess only  General Appearance: Alert, Awake  Respiratory Pattern: Dyspnea with exertion, Dyspnea at rest  Chest Assessment: Chest expansion symmetrical  Bilateral Breath Sounds: Diminished  Cough: Strong  O2 Device: RA    Vitals:  Blood pressure 110/74, pulse 104, temperature 98 °F (36 7 °C), temperature source Oral, resp  rate 22, height 5' 6" (1 676 m), weight 74 5 kg (164 lb 3 2 oz), SpO2 99 %  Imaging and other studies: I have personally reviewed pertinent reports  O2 Device: RA     Plan             Resp Comments: (P) pt admitted with SOB, R pl effusion, pna, cardiac history  pt denies pulmonary history  current smoker about 1 pack for three days  Offered pt IS, pt declined use  encouraged deep breathing on his own  will DC protocol

## 2022-08-06 NOTE — ED PROVIDER NOTES
History  Chief Complaint   Patient presents with    Shortness of Breath     Pt arrives reporting bilateral lower extremity increasing over past week  Pt reports hx of CHF and reports he is compliant with taking meds  Pt denies chest pain  Pt reports SOB at present  Patient is a 19-year-old male presents emergency room complaining of shortness of breath and bilateral lower extremity edema and pain ongoing over the last week  Patient is also complaining of hemoptysis  Reports that he is not having any chest pain  Reports he gets dyspneic on exertion  States that he was on Lasix 40 mg b i d  And self increased 80 mg b i d   Patient also underwent vascular duplex yesterday  Has also recently seen pulmonology  Patient's does follow-up with vascular surgery  Reports that he is having difficulty ambulating secondary to the dyspnea and the pain  Patient has a complex medical history significant for CAD with PTCA  He also has a history of congestive heart failure with an ejection fraction of 20-25%  Patient has known vascular insufficiency having undergone an right bypass to the right common femoral artery and left common iliac artery  History provided by:  Patient and parent  Shortness of Breath  Severity:  Moderate  Onset quality:  Gradual  Timing:  Intermittent  Progression:  Worsening  Context: activity    Relieved by:  Rest  Worsened by:  Exertion  Ineffective treatments:  None tried  Associated symptoms: cough (Hemoptysis has had previously) and diaphoresis    Associated symptoms: no abdominal pain, no chest pain, no ear pain, no fever, no headaches, no rash, no sore throat, no vomiting and no wheezing        Prior to Admission Medications   Prescriptions Last Dose Informant Patient Reported?  Taking?   atorvastatin (LIPITOR) 80 mg tablet Not Taking at Unknown time  Yes No   Sig: Take 80 mg by mouth daily   Patient not taking: Reported on 8/6/2022   clopidogrel (PLAVIX) 75 mg tablet   Yes Yes   Sig: Take 75 mg by mouth daily   furosemide (LASIX) 40 mg tablet   Yes Yes   Sig: Take 40 mg by mouth 2 (two) times a day   metoprolol succinate (TOPROL-XL) 25 mg 24 hr tablet   Yes Yes   Sig: Take 25 mg by mouth daily   potassium chloride (K-DUR,KLOR-CON) 20 mEq tablet   No Yes   Sig: Take 1 tablet (20 mEq total) by mouth 2 (two) times a day   warfarin (COUMADIN) 5 mg tablet   Yes Yes   Sig: Take 5 mg by mouth daily      Facility-Administered Medications: None       Past Medical History:   Diagnosis Date    CHF (congestive heart failure) (HCC)     Coronary artery disease     MI, old        Past Surgical History:   Procedure Laterality Date    ABDOMINAL SURGERY      CORONARY ANGIOPLASTY WITH STENT PLACEMENT  04/10/2022    x 2       History reviewed  No pertinent family history  I have reviewed and agree with the history as documented  E-Cigarette/Vaping    E-Cigarette Use Never User      E-Cigarette/Vaping Substances     Social History     Tobacco Use    Smoking status: Current Every Day Smoker     Types: Cigarettes    Smokeless tobacco: Never Used   Vaping Use    Vaping Use: Never used   Substance Use Topics    Alcohol use: Yes     Comment: rarely    Drug use: Not Currently       Review of Systems   Constitutional: Positive for diaphoresis  Negative for activity change, fatigue and fever  HENT: Negative for congestion, ear pain, rhinorrhea, sinus pressure and sore throat  Eyes: Negative  Respiratory: Positive for cough (Hemoptysis has had previously) and shortness of breath  Negative for choking, chest tightness, wheezing and stridor  Cardiovascular: Positive for leg swelling  Negative for chest pain and palpitations  Gastrointestinal: Negative  Negative for abdominal pain, diarrhea, nausea and vomiting  Endocrine: Negative  Genitourinary: Negative for dysuria, flank pain and frequency  Musculoskeletal: Negative for arthralgias, back pain and myalgias  Skin: Negative  Negative for pallor and rash  Allergic/Immunologic: Negative  Neurological: Positive for weakness  Negative for dizziness and headaches  Hematological: Negative  Psychiatric/Behavioral: Negative  All other systems reviewed and are negative  Physical Exam  Physical Exam  Vitals and nursing note reviewed  Constitutional:       General: He is not in acute distress  Appearance: Normal appearance  He is well-developed  He is not ill-appearing or toxic-appearing  HENT:      Head: Normocephalic and atraumatic  Hair is normal       Jaw: No pain on movement  Right Ear: External ear normal       Left Ear: External ear normal       Nose: Nose normal       Mouth/Throat:      Mouth: Mucous membranes are moist       Pharynx: Oropharynx is clear  Eyes:      General: Lids are normal  No scleral icterus  Extraocular Movements: Extraocular movements intact  Conjunctiva/sclera: Conjunctivae normal       Pupils: Pupils are equal, round, and reactive to light  Cardiovascular:      Rate and Rhythm: Normal rate and regular rhythm  Pulses: Decreased pulses  Dorsalis pedis pulses are detected w/ Doppler on the right side and detected w/ Doppler on the left side  Heart sounds: Normal heart sounds  No murmur heard  Comments: Pulses are diminished but present and detected with Doppler bilaterally in the DP    There is mottling overlying both knees  Pulmonary:      Effort: Pulmonary effort is normal  No respiratory distress  Breath sounds: Decreased breath sounds present  No wheezing, rhonchi or rales  Abdominal:      General: Abdomen is flat  There is no distension  Palpations: Abdomen is soft  Abdomen is not rigid  Tenderness: There is no abdominal tenderness  Musculoskeletal:         General: No deformity or signs of injury  Normal range of motion  Cervical back: Normal range of motion and neck supple  Right lower leg: Tenderness present   Edema present  Left lower leg: Tenderness present  Edema present  Skin:     General: Skin is warm and dry  Coloration: Skin is mottled (Both knees) and pale  Findings: No rash  Neurological:      General: No focal deficit present  Mental Status: He is alert and oriented to person, place, and time  Mental status is at baseline     Psychiatric:         Attention and Perception: Attention normal          Mood and Affect: Mood normal          Speech: Speech normal          Behavior: Behavior normal          Vital Signs  ED Triage Vitals [08/06/22 1235]   Temperature Pulse Respirations Blood Pressure SpO2   97 6 °F (36 4 °C) 105 18 111/78 99 %      Temp src Heart Rate Source Patient Position - Orthostatic VS BP Location FiO2 (%)   -- -- -- -- --      Pain Score       5           Vitals:    08/06/22 1515 08/06/22 1530 08/06/22 1545 08/06/22 1620   BP:       Pulse: 105 97 98 104         Visual Acuity      ED Medications  Medications   atorvastatin (LIPITOR) tablet 80 mg (has no administration in time range)   clopidogrel (PLAVIX) tablet 75 mg (has no administration in time range)   metoprolol succinate (TOPROL-XL) 24 hr tablet 25 mg (has no administration in time range)   potassium chloride (K-DUR,KLOR-CON) CR tablet 20 mEq (has no administration in time range)   warfarin (COUMADIN) tablet 5 mg (has no administration in time range)   docusate sodium (COLACE) capsule 100 mg (has no administration in time range)   polyethylene glycol (MIRALAX) packet 17 g (has no administration in time range)   nicotine (NICODERM CQ) 14 mg/24hr TD 24 hr patch 1 patch (has no administration in time range)   furosemide (LASIX) injection 80 mg (has no administration in time range)   acetaminophen (TYLENOL) tablet 650 mg (has no administration in time range)   oxyCODONE (ROXICODONE) IR tablet 5 mg (has no administration in time range)   cefTRIAXone (ROCEPHIN) IVPB (premix in dextrose) 1,000 mg 50 mL (has no administration in time range)   morphine injection 4 mg (4 mg Intravenous Given 8/6/22 1400)   furosemide (LASIX) 120 mg in dextrose 5 % 50 mL IVPB (0 mg Intravenous Stopped 8/6/22 1459)   iohexol (OMNIPAQUE) 350 MG/ML injection (MULTI-DOSE) 60 mL (60 mL Intravenous Given 8/6/22 1434)   cefTRIAXone (ROCEPHIN) IVPB (premix in dextrose) 1,000 mg 50 mL (0 mg Intravenous Stopped 8/6/22 1551)       Diagnostic Studies  Results Reviewed     Procedure Component Value Units Date/Time    Procalcitonin [[id number]] Collected: 08/06/22 1246    Lab Status: In process Specimen: Blood from Arm, Right Updated: 08/06/22 1638    Magnesium [[id number]] Collected: 08/06/22 1246    Lab Status: In process Specimen: Blood from Arm, Right Updated: 08/06/22 1638    Blood culture #1 [[id number]] Collected: 08/06/22 1532    Lab Status: In process Specimen: Blood from Arm, Right Updated: 08/06/22 1540    Blood culture #2 [[id number]] Collected: 08/06/22 1532    Lab Status:  In process Specimen: Blood from Arm, Left Updated: 08/06/22 1539    HS Troponin I 4hr [[id number]]     Lab Status: No result Specimen: Blood     HS Troponin I 2hr [[id number]]  (Normal) Collected: 08/06/22 1358    Lab Status: Final result Specimen: Blood from Arm, Right Updated: 08/06/22 1433     hs TnI 2hr 15 ng/L      Delta 2hr hsTnI 0 ng/L     Protime-INR [[id number]]  (Abnormal) Collected: 08/06/22 1358    Lab Status: Final result Specimen: Blood from Arm, Right Updated: 08/06/22 1422     Protime 30 4 seconds      INR 2 91    HS Troponin 0hr (reflex protocol) [[id number]]  (Normal) Collected: 08/06/22 1246    Lab Status: Final result Specimen: Blood from Arm, Right Updated: 08/06/22 1318     hs TnI 0hr 15 ng/L     Comprehensive metabolic panel [[id number]]  (Abnormal) Collected: 08/06/22 1246    Lab Status: Final result Specimen: Blood from Arm, Right Updated: 08/06/22 1317     Sodium 137 mmol/L      Potassium 3 6 mmol/L      Chloride 100 mmol/L      CO2 24 mmol/L      ANION GAP 13 mmol/L      BUN 17 mg/dL      Creatinine 0 96 mg/dL      Glucose 118 mg/dL      Calcium 9 0 mg/dL      Corrected Calcium 9 8 mg/dL      AST 35 U/L      ALT 66 U/L      Alkaline Phosphatase 304 U/L      Total Protein 6 6 g/dL      Albumin 3 0 g/dL      Total Bilirubin 0 89 mg/dL      eGFR 88 ml/min/1 73sq m     Narrative:      National Kid[employer] Foundation guidelines for Chronic Kidney Disease (CKD):     Stage 1 with normal or high GFR (GFR > 90 mL/min/1 73 square meters)    Stage 2 Mild CKD (GFR = 60-89 mL/min/1 73 square meters)    Stage 3A Moderate CKD (GFR = 45-59 mL/min/1 73 square meters)    Stage 3B Moderate CKD (GFR = 30-44 mL/min/1 73 square meters)    Stage 4 Severe CKD (GFR = 15-29 mL/min/1 73 square meters)    Stage 5 End Stage CKD (GFR <15 mL/min/1 73 square meters)  Note: GFR calculation is accurate only with a steady state creatinine    NT-BNP PRO [[id number]]  (Abnormal) Collected: 08/06/22 1246    Lab Status: Final result Specimen: Blood from Arm, Right Updated: 08/06/22 1317     NT-proBNP 2,971 pg/mL     CBC and differential [[id number]]  (Abnormal) Collected: 08/06/22 1246    Lab Status: Final result Specimen: Blood from Arm, Right Updated: 08/06/22 1252     WBC 7 14 Thousand/uL      RBC 4 65 Million/uL      Hemoglobin 11 1 g/dL      Hematocrit 38 8 %      MCV 83 fL      MCH 23 9 pg      MCHC 28 6 g/dL      RDW 18 0 %      MPV 9 1 fL      Platelets 050 Thousands/uL      nRBC 0 /100 WBCs      Neutrophils Relative 73 %      Immat GRANS % 0 %      Lymphocytes Relative 18 %      Monocytes Relative 7 %      Eosinophils Relative 1 %      Basophils Relative 1 %      Neutrophils Absolute 5 18 Thousands/µL      Immature Grans Absolute 0 03 Thousand/uL      Lymphocytes Absolute 1 28 Thousands/µL      Monocytes Absolute 0 52 Thousand/µL      Eosinophils Absolute 0 06 Thousand/µL      Basophils Absolute 0 07 Thousands/µL                  CTA ED chest PE Study   Final Result by Ryan Willis MD (08/06 3463)      No pulmonary embolism seen in the left lung, right upper lobe, main pulmonary arteries  Limited and nondiagnostic evaluation of the right lower lobe pulmonary arteries (right interlobar, segmental and subsegmental vessels )due to poor and suboptimal enhancement  related to increased pulmonary artery pressure from overlying moderate to large    right effusion  Moderate to large right effusion      Minimal groundglass haze in the lungs with mild interlobular septal thickening, suggest congestion      There are mixed attenuation focal opacities in the left midlung with evolving nodular consolidation/density, (image 150 series 2 and image 117 series 2) likely due to evolution of the Covid related infiltrate seen on the previous study of May 31, 2022  However short interval follow-up at 2 months suggested for stability   The study was marked in [facility] for immediate notification  Workstation performed: [id number]         XR chest 1 view portable   ED Interpretation by Eyal Bautista DO (08/06 1342)   Improved from chest x-ray on May 31, 2022      Final Result by Marisol Alves MD (08/06 1500)      Right base infiltrate  Right pleural effusion      The study was marked in EPIC for immediate notification              Workstation performed: [id number]                    Procedures  ECG 12 Lead Documentation Only    Date/Time: 8/6/2022 2:16 PM  Performed by: Eyal Bautista DO  Authorized by: Eyal Bautista DO     Indications / Diagnosis:  Shortness of breath  ECG reviewed by me, the ED Provider: yes    Patient location:  ED  Previous ECG:     Previous ECG:  Compared to current    Comparison ECG info:  Improved from EKG performed on April 19,2022    Similarity:  No change  Interpretation:     Interpretation: non-specific    Rate:     ECG rate assessment: normal    Rhythm:     Rhythm: sinus rhythm    Ectopy:     Ectopy: none    QRS:     QRS axis:  Normal  ST segments:     ST segments:  Non-specific  T waves:     T waves: non-specific               ED Course  ED Course as of 08/06/22 1641   Sat Aug 06, 2022   1348 Will discuss findings with vascular surgery  Patient follows with vascular surgery at [facility]  Has also seen local [facility] [address] vascular surgery  Received message back from nurse that both vascular surgeons are currently scrubbed in the OR and will contact me when they are free from that case within the next 1 to 2 hours  1348 Patient's vascular duplex graft performed yesterday showed patent right limb with abnormal flow demonstrated and a 50 to 69% stenosis in the right common femoral artery and occlusion of the right superficial femoral artery with distal recannulization  There was also occlusion of the left limb of the aortic bifurcated bypass graft  1448 Vascular surgery reviewed case  No emergent recommendations for intervention on the weekend and patient has follow-up with vascular surgery in the office on Monday  May be discharged home from a vascular surgery standpoint   1508 Patient with a moderate to refer large right-sided pleural effusion identified on CTA of the chest   There is evolution of patient's COVID-19 infection  Will discuss case with medicine  1521 Doubt pneumonia, however, given CT scan reading will cover with a dose of antibiotics  Will discuss with medicine  MDM  Number of Diagnoses or Management Options  Acute on chronic congestive heart failure ([address] ): established and worsening  Arterial insufficiency of lower extremity ([address] ): established and worsening  Dyspnea: established and worsening  Pleural effusion on right: new and requires workup  Pneumonia: new and requires workup  Diagnosis management comments: Patient presented to the emergency department and a MSE was performed   The patient was evaluated and diagnosed with acute exacerbation of congestive heart failure and new finding of a moderate to large right-sided pleural effusion with questionable pneumonia  Patient also has worsening arterial insufficiency of the lower extremities but this was discussed with vascular  It is the cardiopulmonary findings requiring admission  This is an acute exacerbation of a chronic disease process that will require additional planned work-up and treatment in a hospitalized setting  As may have been required as part of this evaluation, clinical laboratory test, radiology imaging and medical testing (I e  EKG) were ordered as necessitated by the patient's presentation  I independently reviewed these studies, imaging and testing  This patient's case is considered to be a considerable risk secondary to the above listed disease process and poses a threat to the patient's well-being and baseline function  Further in-patient diagnostic testing and management, which may include the administration of parenteral medications, is required              Amount and/or Complexity of Data Reviewed  Clinical lab tests: ordered and reviewed  Tests in the radiology section of CPT®: ordered and reviewed  Decide to obtain previous medical records or to obtain history from someone other than the patient: yes  Obtain history from someone other than the patient: yes (Spouse)  Review and summarize past medical records: yes  Discuss the patient with other providers: yes (Vascular surgery and Medicine)  Independent visualization of images, tracings, or specimens: yes    Risk of Complications, Morbidity, and/or Mortality  Presenting problems: high  Diagnostic procedures: moderate  Management options: moderate    Patient Progress  Patient progress: improved      Disposition  Final diagnoses:   Dyspnea   Pleural effusion on right   Arterial insufficiency of lower extremity ([address] )   Acute on chronic congestive heart failure ([address] )   Pneumonia     Time reflects when diagnosis was documented in both MDM as applicable and the Disposition within this note     Time User Action Codes Description Comment    8/6/2022  3:21 PM Bharathi Alvareze Add [R06 00] Dyspnea     8/6/2022  3:22 PM Bharathi Cristina Add [J90] Pleural effusion on right     8/6/2022  3:23 PM Bharathi Cristina Add [I73 9] Arterial insufficiency of lower extremity ([facility] [address] )     8/6/2022  3:23 PM Bharathi Cristina Add [I50 9] Acute on chronic congestive heart failure ([facility] [address] )     8/6/2022  3:23 PM Bharathi Cristina Add [J18 9] Pneumonia       ED Disposition     ED Disposition   Admit    Condition   Stable    Date/Time   Sat Aug 6, 2022  3:37 PM    Comment              Follow-up Information    None         Current Discharge Medication List      CONTINUE these medications which have NOT CHANGED    Details   clopidogrel (PLAVIX) 75 mg tablet Take 75 mg by mouth daily      furosemide (LASIX) 40 mg tablet Take 40 mg by mouth 2 (two) times a day      metoprolol succinate (TOPROL-XL) 25 mg 24 hr tablet Take 25 mg by mouth daily      potassium chloride (K-DUR,KLOR-CON) 20 mEq tablet Take 1 tablet (20 mEq total) by mouth 2 (two) times a day  Qty: 10 tablet, Refills: 0    Associated Diagnoses: Acute on chronic systolic heart failure (HCC)      warfarin (COUMADIN) 5 mg tablet Take 5 mg by mouth daily      atorvastatin (LIPITOR) 80 mg tablet Take 80 mg by mouth daily             No discharge procedures on file      PDMP Review       Value Time User    PDMP Reviewed  Yes 8/6/2022  4:01 PM Cuco Dean MD          ED Provider  Electronically Signed by           Aminta Ruiz, DO  08/06/22 [address], DO  08/06/22 [address], DO  08/06/22 4722

## 2022-08-06 NOTE — ASSESSMENT & PLAN NOTE
Patient was started on Coumadin to prevent thrombus formation given apical akinesis-after recent cardiac catheterization at [facility]  INR is therapeutic, 2 91, continue

## 2022-08-06 NOTE — H&P
[address] Whalen  H&P- Merlyn Luis Eduardo 1965, 56 y o  male MRN: [id number]  Unit/Bed#: -01 Encounter: [id number]  Primary Care Provider: Asia Allen DO   Date and time admitted to hospital: 8/6/2022 12:31 PM    Pleural effusion  Assessment & Plan  Moderate to large right pleural effusion  Most likely secondary to CHF, secondary to ischemic cardiomyopathy  Continue aggressive diuresis-monitor intake and output  Follow cardiology recommendation  If does not improve, consider to consulted Critical care for thoracentesis    PAD (peripheral artery disease) ([facility] [address] )  Assessment & Plan  Symptomatic with claudication-has mottling around the knee joint area on clinical exam  Pedal pulse is faintly palpable with Doppler   Status post aortobifemoral bypass surgery  Patient is still active smoker  Patient follows up with vascular surgery at [address] [place] [address] post arterial duplex on 08/05/2022:  Patent right limp with abnormal flow demonstrated  50-69% stenosis of the right common femoral artery, occlusion of the right superficial femur with distal recanalization  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      Case discuss by ER provider with on-call vascular surgeon Dr Amee Rodney-continue conservative management pain control, no urgent inpatient procedure required      Pneumonia  Assessment & Plan  Suspected most likely community-acquired pneumonia  Patient also has recent history COVID, diagnosed on 05/31/2022-could be residual effect  Since patient is having dyspnea on exertion, will treat patient empirically with ceftriaxone  Follow-up blood culture, procalcitonin, urine Legionella, pneumonia  Follow respiratory protocol    * Acute on chronic systolic CHF (congestive heart failure) (HCC)  Assessment & Plan  Wt Readings from Last 3 Encounters:   08/06/22 74 5 kg (164 lb 3 2 oz)   05/31/22 69 2 kg (152 lb 8 9 oz)   04/19/22 65 6 kg (144 lb 10 oz)     Status post cardiac catheterization at [facility] recently which shows:PCI to the LAD due to 100% occlusion-since patient was continued to have chest pain and elevated cardiac enzymes  Patient was taken back to the cath lab and subsequently had PCI to proximal circ extending to 2 OM vessels  Echocardiogram showed LVEF of 25% with extensive apical akinesis  Patient presented with dyspnea on exertion  Patient also has bilateral edema  Patient is supposed to take 40 mg Lasix b i d , received 80 mg IV in the ER, will continue and see the response  Continue home medication  Will add Aldactone since patient ejection fraction is 25%  Follow cardiology consult  Maintain intake and output, low-sodium diet  Patient was started on  Coumadin to prevent thrombus formation given apical akinesis-after recent cardiac catheterization at [facility]            Prolonged Q-T interval on ECG  Assessment & Plan    Check TSH, magnesium,  Keep potassium more than 4  Avoid QT prolonging medication  Repeat EKG in a m  Tobacco abuse  Assessment & Plan  Nicoderm patch    Chronic anticoagulation  Assessment & Plan  Patient was started on Coumadin to prevent thrombus formation given apical akinesis-after recent cardiac catheterization at [facility]  INR is therapeutic, 2 91, continue    History of COVID-19  Assessment & Plan  Recent history of COVID on 05/31/2022    Dyspnea on exertion  Assessment & Plan  Secondary to CHF-continue treatment as outlined  Follow respiratory protocol  CT PE negative    VTE Pharmacologic Prophylaxis: VTE Score: 5 High Risk (Score >/= 5) - Pharmacological DVT Prophylaxis Ordered: warfarin (Coumadin)  Sequential Compression Devices Ordered  Code Status: Level 1 - Full Code as per patient  Discussion with family: With patient       Anticipated Length of Stay: Patient will be admitted on an inpatient basis with an anticipated length of stay of greater than 2 midnights secondary to To monitor above condition  Total Time for Visit, including Counseling / Coordination of Care: 45 minutes Greater than 50% of this total time spent on direct patient counseling and coordination of care  Chief Complaint:  Dyspnea, claudication    History of Present Illness:  Jessa Hewitt is a 56 y o  male with a PMH of CHF, peripheral arterial disease, smoker who presents with dyspnea on exertion, claudication     Patient reports he has history of bypass graft in both lower extremities and following up with vascular surgery  Patient reports every time he walks about 5-10 face, he needs to see done due to sharp, burning pain affecting both lower extremities especially right cuff which improves after rest   Patient is still smoking  Denies any significant color change but notice some mottling on both knee area  Patient also reports he feels adjusted and difficulty breathing with walking which improves with rest   Denies any cough, nausea, vomiting, diarrhea, chest pain  Review of Systems:  Review of Systems   Constitutional: Positive for activity change, fatigue and unexpected weight change  Negative for appetite change, chills, diaphoresis and fever  HENT: Negative for congestion, dental problem, drooling, sneezing, sore throat, tinnitus and trouble swallowing  Respiratory: Positive for shortness of breath  Negative for apnea, choking, chest tightness, wheezing and stridor  Cardiovascular: Positive for leg swelling  Negative for chest pain and palpitations  Gastrointestinal: Negative for abdominal distention, abdominal pain, anal bleeding, blood in stool, constipation and diarrhea  Genitourinary: Negative for difficulty urinating, dysuria, enuresis and flank pain  Musculoskeletal: Positive for arthralgias and gait problem  Skin: Positive for color change  Neurological: Negative for dizziness, tremors, syncope, facial asymmetry, speech difficulty, weakness and headaches     Hematological: Negative for adenopathy  Psychiatric/Behavioral: Negative for agitation, behavioral problems and confusion  All other systems reviewed and are negative  Past Medical and Surgical History:   Past Medical History:   Diagnosis Date    CHF (congestive heart failure) ([facility] [address] )     Coronary artery disease     MI, old        Past Surgical History:   Procedure Laterality Date    ABDOMINAL SURGERY      CORONARY ANGIOPLASTY WITH STENT PLACEMENT  04/10/2022    x 2       Meds/Allergies:  Prior to Admission medications    Medication Sig Start Date End Date Taking? Authorizing Provider   clopidogrel (PLAVIX) 75 mg tablet Take 75 mg by mouth daily 4/13/22  Yes Historical Provider, MD   furosemide (LASIX) 40 mg tablet Take 40 mg by mouth 2 (two) times a day   Yes Historical Provider, MD   metoprolol succinate (TOPROL-XL) 25 mg 24 hr tablet Take 25 mg by mouth daily 4/13/22  Yes Historical Provider, MD   potassium chloride (K-DUR,KLOR-CON) 20 mEq tablet Take 1 tablet (20 mEq total) by mouth 2 (two) times a day 6/1/22  Yes Bedford Lombard Halupa, DO   warfarin (COUMADIN) 5 mg tablet Take 5 mg by mouth daily 4/12/22  Yes Historical Provider, MD   atorvastatin (LIPITOR) 80 mg tablet Take 80 mg by mouth daily  Patient not taking: Reported on 8/6/2022 4/12/22   Historical Provider, MD   benzonatate (TESSALON PERLES) 100 mg capsule Take 1 capsule (100 mg total) by mouth every 8 (eight) hours  Patient not taking: Reported on 8/6/2022 6/1/22 8/6/22  Bedford Lombard Halupa, DO   ergocalciferol (ERGOCALCIFEROL) 1 25 MG (02270 UT) capsule Take 50,000 Units by mouth once a week 4/17/22 8/6/22  Historical Provider, MD   furosemide (LASIX) 40 mg tablet Take 1 tablet (40 mg total) by mouth daily for 5 days 6/1/22 8/6/22  Julianna Haas DO     I have reviewed home medications with patient personally      Allergies: No Known Allergies    Social History:  Marital Status: Single   Occupation:  Unknown  Patient Pre-hospital Living Situation: Home  Patient Pre-hospital Level of Mobility: walks  Patient Pre-hospital Diet Restrictions:  Cardiac diet  Substance Use History:   Social History     Substance and Sexual Activity   Alcohol Use Yes    Comment: rarely     Social History     Tobacco Use   Smoking Status Current Every Day Smoker    Types: Cigarettes   Smokeless Tobacco Never Used     Social History     Substance and Sexual Activity   Drug Use Not Currently       Family History:  Non contributory    Physical Exam:     Vitals:   Blood Pressure: 110/81 (08/06/22 1430)  Pulse: 104 (08/06/22 1620)  Temperature: 97 6 °F (36 4 °C) (08/06/22 1235)  Respirations: 22 (08/06/22 1620)  Height: 5' 6" (167 6 cm) (08/06/22 1620)  Weight - Scale: 74 5 kg (164 lb 3 2 oz) (08/06/22 1620)  SpO2: 99 % (08/06/22 1620)    Physical Exam  Vitals and nursing note reviewed  Exam conducted with a chaperone present  Constitutional:       Appearance: Normal appearance  He is not ill-appearing or diaphoretic  HENT:      Head: Normocephalic and atraumatic  Nose: Nose normal  No congestion  Mouth/Throat:      Mouth: Mucous membranes are moist       Pharynx: Oropharynx is clear  No oropharyngeal exudate  Eyes:      General: No scleral icterus  Extraocular Movements: Extraocular movements intact  Conjunctiva/sclera: Conjunctivae normal       Pupils: Pupils are equal, round, and reactive to light  Neck:      Vascular: No carotid bruit  Cardiovascular:      Rate and Rhythm: Normal rate  Heart sounds: Murmur heard  No friction rub  No gallop  Pulmonary:      Effort: Pulmonary effort is normal  No respiratory distress  Breath sounds: Normal breath sounds  No stridor  No wheezing or rhonchi  Abdominal:      General: Abdomen is flat  Bowel sounds are normal  There is no distension  Palpations: There is no mass  Tenderness: There is no abdominal tenderness  There is no rebound  Hernia: No hernia is present     Musculoskeletal:         General: Swelling and tenderness (Both lower extremity) present  Cervical back: Normal range of motion  No rigidity or tenderness  Right lower leg: Edema present  Left lower leg: Edema present  Comments: Motor [place] around the knee area in both lower extremities   Lymphadenopathy:      Cervical: No cervical adenopathy  Skin:     Capillary Refill: Capillary refill takes less than 2 seconds  Coloration: Skin is not pale  Findings: No bruising, lesion or rash  Neurological:      General: No focal deficit present  Mental Status: He is alert and oriented to person, place, and time  Cranial Nerves: No cranial nerve deficit  Sensory: No sensory deficit  Motor: No weakness  Coordination: Coordination normal    Psychiatric:         Mood and Affect: Mood normal          Additional Data:     Lab Results:  Results from last 7 days   Lab Units 08/06/22  1246   WBC Thousand/uL 7 14   HEMOGLOBIN g/dL 11 1*   HEMATOCRIT % 38 8   PLATELETS Thousands/uL 309   NEUTROS PCT % 73   LYMPHS PCT % 18   MONOS PCT % 7   EOS PCT % 1     Results from last 7 days   Lab Units 08/06/22  1246   SODIUM mmol/L 137   POTASSIUM mmol/L 3 6   CHLORIDE mmol/L 100   CO2 mmol/L 24   BUN mg/dL 17   CREATININE mg/dL 0 96   ANION GAP mmol/L 13   CALCIUM mg/dL 9 0   ALBUMIN g/dL 3 0*   TOTAL BILIRUBIN mg/dL 0 89   ALK PHOS U/L 304*   ALT U/L 66   AST U/L 35   GLUCOSE RANDOM mg/dL 118     Results from last 7 days   Lab Units 08/06/22  1358   INR  2 91*                   Imaging: Reviewed radiology reports from this admission including: chest CT scan  CTA ED chest PE Study   Final Result by Gilbert Aponte MD (08/06 1503)      No pulmonary embolism seen in the left lung, right upper lobe, main pulmonary arteries        Limited and nondiagnostic evaluation of the right lower lobe pulmonary arteries (right interlobar, segmental and subsegmental vessels )due to poor and suboptimal enhancement  related to increased pulmonary artery pressure from overlying moderate to large    right effusion  Moderate to large right effusion      Minimal groundglass haze in the lungs with mild interlobular septal thickening, suggest congestion      There are mixed attenuation focal opacities in the left midlung with evolving nodular consolidation/density, (image 150 series 2 and image 117 series 2) likely due to evolution of the Covid related infiltrate seen on the previous study of May 31, 2022  However short interval follow-up at 2 months suggested for stability   The study was marked in [place] [facility] for immediate notification  Workstation performed: [id number]         XR chest 1 view portable   ED Interpretation by Jac Aguirre DO (08/06 1342)   Improved from chest x-ray on May 31, 2022      Final Result by Sapphire Richard MD (08/06 1500)      Right base infiltrate  Right pleural effusion      The study was marked in [employer] for immediate notification  Workstation performed: [id number]             EKG and Other Studies Reviewed on Admission:   · EKG: Sinus rhythm with occasional premature ventricular complex rate were axis, prolonged   ** Please Note: This note has been constructed using a voice recognition system   **

## 2022-08-06 NOTE — ED NOTES
Pt transported to unit by TP, no s/s of distress, VS stable, A&Ox4     Shireen Duverney, RN  08/06/22 9960

## 2022-08-06 NOTE — ASSESSMENT & PLAN NOTE
Wt Readings from Last 3 Encounters:   08/06/22 74 5 kg (164 lb 3 2 oz)   05/31/22 69 2 kg (152 lb 8 9 oz)   04/19/22 65 6 kg (144 lb 10 oz)     Status post cardiac catheterization at [facility] recently which shows:PCI to the LAD due to 100% occlusion-since patient was continued to have chest pain and elevated cardiac enzymes  Patient was taken back to the cath lab and subsequently had PCI to proximal circ extending to 2 OM vessels  Echocardiogram showed LVEF of 25% with extensive apical akinesis  Patient presented with dyspnea on exertion  Patient also has bilateral edema  Patient is supposed to take 40 mg Lasix b i d , received 80 mg IV in the ER, will continue and see the response  Continue home medication  Will add Aldactone since patient ejection fraction is 25%  Follow cardiology consult  Maintain intake and output, low-sodium diet    Patient was started on  Coumadin to prevent thrombus formation given apical akinesis-after recent cardiac catheterization at [facility]

## 2022-08-06 NOTE — ASSESSMENT & PLAN NOTE
Check TSH, magnesium,  Keep potassium more than 4  Avoid QT prolonging medication  Repeat EKG in a m

## 2022-08-07 LAB
ALBUMIN SERPL BCP-MCNC: 2.8 G/DL (ref 3.5–5)
ALP SERPL-CCNC: 276 U/L (ref 46–116)
ALT SERPL W P-5'-P-CCNC: 65 U/L (ref 12–78)
ANION GAP SERPL CALCULATED.3IONS-SCNC: 13 MMOL/L (ref 4–13)
AST SERPL W P-5'-P-CCNC: 40 U/L (ref 5–45)
BASOPHILS # BLD AUTO: 0.05 THOUSANDS/ΜL (ref 0–0.1)
BASOPHILS NFR BLD AUTO: 1 % (ref 0–1)
BILIRUB SERPL-MCNC: 0.97 MG/DL (ref 0.2–1)
BUN SERPL-MCNC: 18 MG/DL (ref 5–25)
CALCIUM ALBUM COR SERPL-MCNC: 9.5 MG/DL (ref 8.3–10.1)
CALCIUM SERPL-MCNC: 8.5 MG/DL (ref 8.3–10.1)
CHLORIDE SERPL-SCNC: 101 MMOL/L (ref 96–108)
CO2 SERPL-SCNC: 22 MMOL/L (ref 21–32)
CREAT SERPL-MCNC: 0.87 MG/DL (ref 0.6–1.3)
EOSINOPHIL # BLD AUTO: 0.05 THOUSAND/ΜL (ref 0–0.61)
EOSINOPHIL NFR BLD AUTO: 1 % (ref 0–6)
ERYTHROCYTE [DISTWIDTH] IN BLOOD BY AUTOMATED COUNT: 18.1 % (ref 11.6–15.1)
GFR SERPL CREATININE-BSD FRML MDRD: 97 ML/MIN/1.73SQ M
GLUCOSE SERPL-MCNC: 93 MG/DL (ref 65–140)
HCT VFR BLD AUTO: 34.4 % (ref 36.5–49.3)
HGB BLD-MCNC: 10.2 G/DL (ref 12–17)
IMM GRANULOCYTES # BLD AUTO: 0.02 THOUSAND/UL (ref 0–0.2)
IMM GRANULOCYTES NFR BLD AUTO: 0 % (ref 0–2)
LYMPHOCYTES # BLD AUTO: 1.68 THOUSANDS/ΜL (ref 0.6–4.47)
LYMPHOCYTES NFR BLD AUTO: 29 % (ref 14–44)
MCH RBC QN AUTO: 24 PG (ref 26.8–34.3)
MCHC RBC AUTO-ENTMCNC: 29.7 G/DL (ref 31.4–37.4)
MCV RBC AUTO: 81 FL (ref 82–98)
MONOCYTES # BLD AUTO: 0.44 THOUSAND/ΜL (ref 0.17–1.22)
MONOCYTES NFR BLD AUTO: 8 % (ref 4–12)
NEUTROPHILS # BLD AUTO: 3.66 THOUSANDS/ΜL (ref 1.85–7.62)
NEUTS SEG NFR BLD AUTO: 61 % (ref 43–75)
NRBC BLD AUTO-RTO: 0 /100 WBCS
PLATELET # BLD AUTO: 293 THOUSANDS/UL (ref 149–390)
PMV BLD AUTO: 9.3 FL (ref 8.9–12.7)
POTASSIUM SERPL-SCNC: 3.9 MMOL/L (ref 3.5–5.3)
PROT SERPL-MCNC: 6.2 G/DL (ref 6.4–8.4)
RBC # BLD AUTO: 4.25 MILLION/UL (ref 3.88–5.62)
SODIUM SERPL-SCNC: 136 MMOL/L (ref 135–147)
WBC # BLD AUTO: 5.9 THOUSAND/UL (ref 4.31–10.16)

## 2022-08-07 PROCEDURE — 80053 COMPREHEN METABOLIC PANEL: CPT | Performed by: FAMILY MEDICINE

## 2022-08-07 PROCEDURE — 85025 COMPLETE CBC W/AUTO DIFF WBC: CPT | Performed by: FAMILY MEDICINE

## 2022-08-07 PROCEDURE — 99233 SBSQ HOSP IP/OBS HIGH 50: CPT | Performed by: FAMILY MEDICINE

## 2022-08-07 RX ORDER — METOLAZONE 5 MG/1
2.5 TABLET ORAL DAILY
Status: DISCONTINUED | OUTPATIENT
Start: 2022-08-07 | End: 2022-08-08

## 2022-08-07 RX ADMIN — POTASSIUM CHLORIDE 20 MEQ: 1500 TABLET, EXTENDED RELEASE ORAL at 17:15

## 2022-08-07 RX ADMIN — FUROSEMIDE 80 MG: 10 INJECTION, SOLUTION INTRAMUSCULAR; INTRAVENOUS at 17:15

## 2022-08-07 RX ADMIN — OXYCODONE HYDROCHLORIDE 5 MG: 5 TABLET ORAL at 21:26

## 2022-08-07 RX ADMIN — CLOPIDOGREL BISULFATE 75 MG: 75 TABLET ORAL at 09:16

## 2022-08-07 RX ADMIN — METOLAZONE 2.5 MG: 5 TABLET ORAL at 13:43

## 2022-08-07 RX ADMIN — FUROSEMIDE 80 MG: 10 INJECTION, SOLUTION INTRAMUSCULAR; INTRAVENOUS at 09:16

## 2022-08-07 RX ADMIN — POTASSIUM CHLORIDE 20 MEQ: 1500 TABLET, EXTENDED RELEASE ORAL at 09:16

## 2022-08-07 RX ADMIN — METOPROLOL SUCCINATE 25 MG: 25 TABLET, EXTENDED RELEASE ORAL at 09:16

## 2022-08-07 NOTE — ASSESSMENT & PLAN NOTE
Suspected most likely residual changes from recent COVID-19 infection    Discontinue antibiotics and observe for now  Patient also has recent history COVID, diagnosed on 05/31/2022-could be residual effect  Follow-up blood culture, procalcitonin, urine Legionella, pneumonia  Follow respiratory protocol

## 2022-08-07 NOTE — UTILIZATION REVIEW
Initial Clinical Review    Admission: Date/Time/Statement:   Admission Orders (From admission, onward)     Ordered        08/06/22 1537  INPATIENT ADMISSION  Once                      Orders Placed This Encounter   Procedures    INPATIENT ADMISSION     Standing Status:   Standing     Number of Occurrences:   1     Order Specific Question:   Level of Care     Answer:   Med Surg [16]     Order Specific Question:   Estimated length of stay     Answer:   More than 2 Midnights     Order Specific Question:   Certification     Answer:   I certify that inpatient services are medically necessary for this patient for a duration of greater than two midnights  See H&P and MD Progress Notes for additional information about the patient's course of treatment  ED Arrival Information     Expected   -    Arrival   8/6/2022 12:30    Acuity   Urgent            Means of arrival   Walk-In    Escorted by   Family Member    Service   Hospitalist    Admission type   Urgent            Arrival complaint   leg pain           Chief Complaint   Patient presents with    Shortness of Breath     Pt arrives reporting bilateral lower extremity increasing over past week  Pt reports hx of CHF and reports he is compliant with taking meds  Pt denies chest pain  Pt reports SOB at present  Initial Presentation: 56 y o  male to ED from home w/ [place] , claudication   PMHX CHF , PAD , every time she walks 5-10 ft she has sharp , burning pain affecting BLE , elvis R calf  CXR w/ R infiltrate and pleural effusion   Admitted IP status w/ pleural effusion most likely CHF sec to ischemic cardiomyopathy   Plan to cont aggressive diuresis , monitor I&O , cardiology consult   If no improvement consult critical care for thoracentesis   F/u labs , cultures  Cont IV abx   Acute on chronic CHF cont home meds, add aldactone , cardiology consult , iv lasix , I&O , low Na diet    Started on coumadin to prevent thrombus formation given apical akinesis-after recent cardiac catheterization at [facility]  INR 2 91 , cont   PE: BLE , swelling and tenderness  Date: 8/7   Day 2: pt cont to c/o SOB , does not feel any better as compared to admission   Cont aggressive diuresis , monitor I&O , low Na diet   + BLE edema    Diminished BS on R mid to lower chest     Wt Readings from Last 3 Encounters:   08/07/22 74 8 kg (164 lb 12 8 oz)   05/31/22 69 2 kg (152 lb 8 9 oz)   04/19/22 65 6 kg (144 lb 10 oz)       ED Triage Vitals   Temperature Pulse Respirations Blood Pressure SpO2   08/06/22 1235 08/06/22 1235 08/06/22 1235 08/06/22 1235 08/06/22 1235   97 6 °F (36 4 °C) 105 18 111/78 99 %      Temp Source Heart Rate Source Patient Position - Orthostatic VS BP Location FiO2 (%)   08/06/22 1620 08/06/22 1620 08/06/22 1620 08/06/22 1620 --   Oral Monitor Lying Left arm       Pain Score       08/06/22 1235       5          Wt Readings from Last 1 Encounters:   08/07/22 74 8 kg (164 lb 12 8 oz)     Additional Vital Signs:   08/07/22 10:57:38 98 1 °F (36 7 °C) 106 Abnormal  14 111/80 90 98 % -- -- -- --   08/07/22 07:20:59 98 1 °F (36 7 °C) 108 Abnormal  14 133/99 110 92 % -- -- -- --   08/07/22 02:47:57 98 1 °F (36 7 °C) 109 Abnormal  16 120/90 100 95 % -- -- -- --   08/06/22 23:27:08 98 1 °F (36 7 °C) 109 Abnormal  17 121/91 101 99 % -- -- -- --   08/06/22 2255 -- -- -- -- -- 95 % 28 2 L/min Nasal cannula --   08/06/22 1900 97 2 °F (36 2 °C) Abnormal  103 15 135/117 Abnormal  -- 94 % -- -- -- --   08/06/22 1620 98 °F (36 7 °C) 104 22 110/74 -- 99 % -- -- None (Room air) Lying   08/06/22 1545 -- 98 25 Abnormal  -- -- 98 % -- -- -- --   08/06/22 1530 -- 97 22 -- -- 98 % -- -- -- --   08/06/22 1515 -- 105 33 Abnormal  -- -- 91 % -- -- -- --   08/06/22 1500 -- 108 Abnormal  28 Abnormal  -- -- 91 % -- -- -- --   08/06/22 1445 -- 96 21 -- -- 95 % -- -- -- --   08/06/22 1430 -- 100 23 Abnormal  110/81 92 98 % -- -- -- --   08/06/22 1415 -- 94 26 Abnormal  107/81 89 93 % -- -- -- --   08/06/22 1400 -- 94 24 Abnormal  96/76 82 97 % -- -- -- --   08/06/22 1345 -- 97 28 Abnormal  100/76 83 97 % -- -- -- --   08/06/22 1330 -- 94 26 Abnormal  96/70 79 98 % -- -- -- --   08/06/22 1315 -- 97 29 Abnormal  101/75 84 97 % -- -- -- --   08/06/22 1300 -- 96 18 96/68 77 97 % -- -- -- --   08/06/22 1245 -- 94 17 95/68 76 98 %           Pertinent Labs/Diagnostic Test Results:   8/6 EKG NSR   CTA ED chest PE Study   Final Result by Andra Krishnamurthy MD (08/06 1503)      No pulmonary embolism seen in the left lung, right upper lobe, main pulmonary arteries  Limited and nondiagnostic evaluation of the right lower lobe pulmonary arteries (right interlobar, segmental and subsegmental vessels )due to poor and suboptimal enhancement  related to increased pulmonary artery pressure from overlying moderate to large    right effusion  Moderate to large right effusion      Minimal groundglass haze in the lungs with mild interlobular septal thickening, suggest congestion      There are mixed attenuation focal opacities in the left midlung with evolving nodular consolidation/density, (image 150 series 2 and image 117 series 2) likely due to evolution of the Covid related infiltrate seen on the previous study of May 31, 2022  However short interval follow-up at 2 months suggested for stability   The study was marked in [facility] for immediate notification  Workstation performed: [id number]         XR chest 1 view portable   ED Interpretation by Parminder Fermin DO (08/06 1342)   Improved from chest x-ray on May 31, 2022      Final Result by Andrew Kong MD (08/06 1500)      Right base infiltrate  Right pleural effusion      The study was marked in [facility] for immediate notification              Workstation performed: [id number]           Results from last 7 days   Lab Units 08/07/22  0451 08/06/22  1246   WBC Thousand/uL 5 90 7 14   HEMOGLOBIN g/dL 10 2* 11 1*   HEMATOCRIT % 34 4* 38 8   PLATELETS Thousands/uL 293 309 NEUTROS ABS Thousands/µL 3 66 5 18     Results from last 7 days   Lab Units 08/07/22  0451 08/06/22  1246   SODIUM mmol/L 136 137   POTASSIUM mmol/L 3 9 3 6   CHLORIDE mmol/L 101 100   CO2 mmol/L 22 24   ANION GAP mmol/L 13 13   BUN mg/dL 18 17   CREATININE mg/dL 0 87 0 96   EGFR ml/min/1 73sq m 97 88   CALCIUM mg/dL 8 5 9 0   MAGNESIUM mg/dL  --  2 0     Results from last 7 days   Lab Units 08/07/22  0451 08/06/22  1246   AST U/L 40 35   ALT U/L 65 66   ALK PHOS U/L 276* 304*   TOTAL PROTEIN g/dL 6 2* 6 6   ALBUMIN g/dL 2 8* 3 0*   TOTAL BILIRUBIN mg/dL 0 97 0 89     Results from last 7 days   Lab Units 08/07/22  0451 08/06/22  1246   GLUCOSE RANDOM mg/dL 93 118     Results from last 7 days   Lab Units 08/06/22  1728 08/06/22  1358 08/06/22  1246   HS TNI 0HR ng/L  --   --  15   HS TNI 2HR ng/L  --  15  --    HSTNI D2 ng/L  --  0  --    HS TNI 4HR ng/L 15  --   --    HSTNI D4 ng/L 0  --   --        Results from last 7 days   Lab Units 08/06/22  1358   PROTIME seconds 30 4*   INR  2 91*     Results from last 7 days   Lab Units 08/06/22  1246   TSH 3RD GENERATON uIU/mL 2 238     Results from last 7 days   Lab Units 08/06/22  1246   PROCALCITONIN ng/ml 0 13     Results from last 7 days   Lab Units 08/06/22  1246   NT-PRO BNP pg/mL 2,971*     Results from last 7 days   Lab Units 08/06/22  1631   STREP PNEUMONIAE ANTIGEN, URINE  Negative   LEGIONELLA URINARY ANTIGEN  Negative     Results from last 7 days   Lab Units 08/06/22  1532   BLOOD CULTURE  Received in Microbiology Lab  Culture in Progress  Received in Microbiology Lab  Culture in Progress       ED Treatment:   Medication Administration from 08/06/2022 1226 to 08/06/2022 1605       Date/Time Order Dose Route Action     08/06/2022 1400 morphine injection 4 mg 4 mg Intravenous Given     08/06/2022 1401 furosemide (LASIX) 120 mg in dextrose 5 % 50 mL IVPB 120 mg Intravenous New Bag     08/06/2022 1533 cefTRIAXone (ROCEPHIN) IVPB (premix in dextrose) 1,000 mg 50 mL 1,000 mg Intravenous New Bag        Past Medical History:   Diagnosis Date    CHF (congestive heart failure) (HCC)     Coronary artery disease     MI, old      Present on Admission:   Acute on chronic systolic CHF (congestive heart failure) (HCC)   Dyspnea on exertion   PAD (peripheral artery disease) (HCC)   History of COVID-19   Pleural effusion, right   Tobacco abuse   Prolonged Q-T interval on ECG      Admitting Diagnosis: Pneumonia [J18 9]  Dyspnea [R06 00]  SOB (shortness of breath) [R06 02]  Pleural effusion on right [J90]  Acute on chronic congestive heart failure (HCC) [I50 9]  Arterial insufficiency of lower extremity (HCC) [I73 9]  Age/Sex: 56 y o  male  Admission Orders:  Scheduled Medications:  atorvastatin, 80 mg, Oral, Daily  clopidogrel, 75 mg, Oral, Daily  docusate sodium, 100 mg, Oral, BID  furosemide, 80 mg, Intravenous, BID (diuretic)  metolazone, 2 5 mg, Oral, Daily  metoprolol succinate, 25 mg, Oral, Daily  nicotine, 1 patch, Transdermal, Daily  polyethylene glycol, 17 g, Oral, Daily  potassium chloride, 20 mEq, Oral, BID      Continuous IV Infusions:     PRN Meds:  acetaminophen, 650 mg, Oral, Q4H PRN  oxyCODONE, 5 mg, Oral, Q4H PRN    OT PT eval   Fld restriction 1500 ml   Cont pulse ox   Tele   I&O     IP CONSULT TO NUTRITION SERVICES  IP CONSULT TO CARDIOLOGY  IP CONSULT TO CASE MANAGEMENT    Network Utilization Review Department  ATTENTION: Please call with any questions or concerns to [phone] and carefully listen to the prompts so that you are directed to the right person  All voicemails are confidential   Tessa Henning all requests for admission clinical reviews, approved or denied determinations and any other requests to dedicated fax number below belonging to the campus where the patient is receiving treatment   List of dedicated fax numbers for the Facilities:  [address] [place] [address] DENIALS (Administrative/Medical Necessity) [phone]   [address] [place] [address] (Maternity/NICU/Pediatrics) 261 [facility] [facility] 40 125 [facility]  [phone]   [address] [address] [place] [address] [address] [facility] [address] [facility] [phone]

## 2022-08-07 NOTE — ASSESSMENT & PLAN NOTE
Check TSH, magnesium,  Keep potassium more than 4  Avoid QT prolonging medication  Repeat EKG in a m    Now resolved

## 2022-08-07 NOTE — PLAN OF CARE
Problem: Potential for Falls  Goal: Patient will remain free of falls  Description: INTERVENTIONS:  - Educate patient/family on patient safety including physical limitations  - Instruct patient to call for assistance with activity   - Consult OT/PT to assist with strengthening/mobility   - Keep Call bell within reach  - Keep bed low and locked with side rails adjusted as appropriate  - Keep care items and personal belongings within reach  - Initiate and maintain comfort rounds  - Make Fall Risk Sign visible to staff  - Offer Toileting every 2 Hours, in advance of need  - Consider moving patient to room near nurses station  Outcome: Progressing     Problem: PAIN - ADULT  Goal: Verbalizes/displays adequate comfort level or baseline comfort level  Description: Interventions:  - Encourage patient to monitor pain and request assistance  - Assess pain using appropriate pain scale  - Administer analgesics based on type and severity of pain and evaluate response  - Implement non-pharmacological measures as appropriate and evaluate response  - Consider cultural and social influences on pain and pain management  - Notify physician/advanced practitioner if interventions unsuccessful or patient reports new pain  Outcome: Progressing     Problem: Knowledge Deficit  Goal: Patient/family/caregiver demonstrates understanding of disease process, treatment plan, medications, and discharge instructions  Description: Complete learning assessment and assess knowledge base    Interventions:  - Provide teaching at level of understanding  - Provide teaching via preferred learning methods  Outcome: Progressing

## 2022-08-07 NOTE — ASSESSMENT & PLAN NOTE
Wt Readings from Last 3 Encounters:   08/07/22 74 8 kg (164 lb 12 8 oz)   05/31/22 69 2 kg (152 lb 8 9 oz)   04/19/22 65 6 kg (144 lb 10 oz)     Status post cardiac catheterization at [facility] recently which shows:PCI to the LAD due to 100% occlusion-since patient was continued to have chest pain and elevated cardiac enzymes  Patient was taken back to the cath lab and subsequently had PCI to proximal circ extending to 2 OM vessels  Echocardiogram showed LVEF of 25% with extensive apical akinesis  Patient presented with dyspnea on exertion  Patient also has bilateral edema  Patient is supposed to take 40 mg Lasix b i d , received 80 mg IV in the ER, continue on Lasix 80 mg IV b i d  and also add metolazone 2 5 mg daily and assess response  Continue home medication  Will add Aldactone since patient ejection fraction is 25%  Follow cardiology consult  Maintain intake and output, low-sodium diet  Patient was started on  Coumadin to prevent thrombus formation given apical akinesis-after recent cardiac catheterization at [facility]    Will hold temporarily in case thoracentesis is needed

## 2022-08-07 NOTE — PROGRESS NOTES
[address]  Progress Note - Liam Perez 1965, 56 y o  male MRN: [id number]  Unit/Bed#: [facility]-01 Encounter: [id number]  Primary Care Provider: Zahraa Doss DO   Date and time admitted to hospital: 8/6/2022 12:31 PM    * Acute on chronic systolic CHF (congestive heart failure) ([facility] [address] )  Assessment & Plan  Wt Readings from Last 3 Encounters:   08/07/22 74 8 kg (164 lb 12 8 oz)   05/31/22 69 2 kg (152 lb 8 9 oz)   04/19/22 65 6 kg (144 lb 10 oz)     Status post cardiac catheterization at [facility] recently which shows:PCI to the LAD due to 100% occlusion-since patient was continued to have chest pain and elevated cardiac enzymes  Patient was taken back to the cath lab and subsequently had PCI to proximal circ extending to 2 OM vessels  Echocardiogram showed LVEF of 25% with extensive apical akinesis  Patient presented with dyspnea on exertion  Patient also has bilateral edema  Patient is supposed to take 40 mg Lasix b i d , received 80 mg IV in the ER, continue on Lasix 80 mg IV b i d  and also add metolazone 2 5 mg daily and assess response  Continue home medication  Will add Aldactone since patient ejection fraction is 25%  Follow cardiology consult  Maintain intake and output, low-sodium diet  Patient was started on  Coumadin to prevent thrombus formation given apical akinesis-after recent cardiac catheterization at [facility]    Will hold temporarily in case thoracentesis is needed            Pleural effusion, right  Assessment & Plan  Moderate to large right pleural effusion  Most likely secondary to CHF, secondary to ischemic cardiomyopathy  Continue aggressive diuresis-monitor intake and output  Follow cardiology recommendation  If does not improve, consider to consulted Critical care for thoracentesis  Hold Coumadin for now    Prolonged Q-T interval on ECG  Assessment & Plan    Check TSH, magnesium,  Keep potassium more than 4  Avoid QT prolonging medication  Repeat EKG in a m  Now resolved    Tobacco abuse  Assessment & Plan  Nicoderm patch  Counseled on smoking cessation    History of COVID-19  Assessment & Plan  Recent history of COVID on 05/31/2022    PAD (peripheral artery disease) ([facility])  Assessment & Plan  Symptomatic with claudication-has mottling around the knee joint area on clinical exam  Pedal pulse is faintly palpable with Doppler   Status post aortobifemoral bypass surgery  Patient is still active smoker  Patient follows up with vascular surgery at [address] [place] [address] post arterial duplex on 08/05/2022:  Patent right limp with abnormal flow demonstrated  50-69% stenosis of the right common femoral artery, occlusion of the right superficial femur with distal recanalization  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  Case discuss by ER provider with [facility] Dr Mil[facility] Mayer-continue conservative management pain control, no urgent inpatient procedure required      Pneumonia  Assessment & Plan  Suspected most likely residual changes from recent COVID-19 infection  Discontinue antibiotics and observe for now  Patient also has recent history COVID, diagnosed on 05/31/2022-could be residual effect  Follow-up blood culture, procalcitonin, urine Legionella, pneumonia  Follow respiratory protocol      VTE Pharmacologic Prophylaxis:   Pharmacologic: Warfarin (Coumadin) placed on hold  Mechanical VTE Prophylaxis in Place: Yes    Patient Centered Rounds: I have performed bedside rounds with nursing staff today  Discussions with Specialists or Other Care Team Provider:  None    Education and Discussions with Family / Patient:  Discussed with patient at bedside will update family    Time Spent for Care: 45 minutes  More than 50% of total time spent on counseling and coordination of care as described above      Current Length of Stay: 1 day(s)    Current Patient Status: Inpatient Certification Statement: The patient will continue to require additional inpatient hospital stay due to Acute on chronic systolic CHF exacerbation    Discharge Plan:  Pending progress    Code Status: Level 1 - Full Code      Subjective:   Patient complains of shortness of breath  He states he feels no better compared to admission  Denies any fevers or chills    Objective:     Vitals:   Temp (24hrs), Av 9 °F (36 6 °C), Min:97 2 °F (36 2 °C), Max:98 1 °F (36 7 °C)    Temp:  [97 2 °F (36 2 °C)-98 1 °F (36 7 °C)] 98 1 °F (36 7 °C)  HR:  [] 106  Resp:  [14-33] 14  BP: ()/() 111/80  SpO2:  [91 %-99 %] 98 %  Body mass index is 26 6 kg/m²  Input and Output Summary (last 24 hours): Intake/Output Summary (Last 24 hours) at 2022 1141  Last data filed at 2022 1058  Gross per 24 hour   Intake 980 ml   Output 2425 ml   Net -1445 ml       Physical Exam:     Physical Exam  Vitals and nursing note reviewed  Constitutional:       Appearance: He is ill-appearing  HENT:      Head: Normocephalic and atraumatic  Right Ear: External ear normal       Left Ear: External ear normal       Nose: Nose normal       Mouth/Throat:      Pharynx: Oropharynx is clear  Eyes:      Pupils: Pupils are equal, round, and reactive to light  Cardiovascular:      Rate and Rhythm: Normal rate and regular rhythm  Heart sounds: Normal heart sounds  Pulmonary:      Comments: Moderate air entry bilaterally with diminished breath sounds on the right mid to lower chest  Abdominal:      General: Bowel sounds are normal       Palpations: Abdomen is soft  Tenderness: There is no abdominal tenderness  Musculoskeletal:         General: Normal range of motion  Cervical back: Normal range of motion and neck supple  Right lower leg: Edema present  Left lower leg: Edema present  Skin:     General: Skin is warm and dry  Capillary Refill: Capillary refill takes less than 2 seconds  Neurological:      General: No focal deficit present  Mental Status: He is alert and oriented to person, place, and time  Psychiatric:         Mood and Affect: Mood normal            Additional Data:     Labs:    Results from last 7 days   Lab Units 08/07/22  0451   WBC Thousand/uL 5 90   HEMOGLOBIN g/dL 10 2*   HEMATOCRIT % 34 4*   PLATELETS Thousands/uL 293   NEUTROS PCT % 61   LYMPHS PCT % 29   MONOS PCT % 8   EOS PCT % 1     Results from last 7 days   Lab Units 08/07/22  0451   SODIUM mmol/L 136   POTASSIUM mmol/L 3 9   CHLORIDE mmol/L 101   CO2 mmol/L 22   BUN mg/dL 18   CREATININE mg/dL 0 87   ANION GAP mmol/L 13   CALCIUM mg/dL 8 5   ALBUMIN g/dL 2 8*   TOTAL BILIRUBIN mg/dL 0 97   ALK PHOS U/L 276*   ALT U/L 65   AST U/L 40   GLUCOSE RANDOM mg/dL 93     Results from last 7 days   Lab Units 08/06/22  1358   INR  2 91*             Results from last 7 days   Lab Units 08/06/22  1246   PROCALCITONIN ng/ml 0 13           * I Have Reviewed All Lab Data Listed Above  * Additional Pertinent Lab Tests Reviewed: [address] Admission Reviewed    Imaging:    Imaging Reports Reviewed Today Include:  CT chest and chest x-ray  Imaging Personally Reviewed by Myself Includes:  CT chest    Recent Cultures (last 7 days):     Results from last 7 days   Lab Units 08/06/22  1631 08/06/22  1532   BLOOD CULTURE   --  Received in Microbiology Lab  Culture in Progress  Received in Microbiology Lab  Culture in Progress     LEGIONELLA URINARY ANTIGEN  Negative  --        Last 24 Hours Medication List:   Current Facility-Administered Medications   Medication Dose Route Frequency Provider Last Rate    acetaminophen  650 mg Oral Q4H PRN Devon Liu MD      atorvastatin  80 mg Oral Daily Devon Liu MD      clopidogrel  75 mg Oral Daily Sudhir Aguirre MD      docusate sodium  100 mg Oral BID Devon Liu MD      furosemide  80 mg Intravenous BID (diuretic) MD Zeus Carver metolazone  2 5 mg Oral Daily Michaela Campos MD      metoprolol succinate  25 mg Oral Daily Gita Simpson MD      nicotine  1 patch Transdermal Daily Gita Simpson MD      oxyCODONE  5 mg Oral Q4H PRN Gita Simpson MD      polyethylene glycol  17 g Oral Daily Gita Simpson MD      potassium chloride  20 mEq Oral BID Gita Simpson MD          Today, Patient Was Seen By: Michaela Campos MD    ** Please Note: Dictation voice to text software may have been used in the creation of this document   **

## 2022-08-07 NOTE — PLAN OF CARE
Problem: Potential for Falls  Goal: Patient will remain free of falls  Description: INTERVENTIONS:  - Educate patient/family on patient safety including physical limitations  - Instruct patient to call for assistance with activity   - Consult OT/PT to assist with strengthening/mobility   - Keep Call bell within reach  - Keep bed low and locked with side rails adjusted as appropriate  - Keep care items and personal belongings within reach  - Initiate and maintain comfort rounds  - Make Fall Risk Sign visible to staff  - Offer Toileting every 2 Hours, in advance of need  - Consider moving patient to room near nurses station  Outcome: Progressing     Problem: PAIN - ADULT  Goal: Verbalizes/displays adequate comfort level or baseline comfort level  Description: Interventions:  - Encourage patient to monitor pain and request assistance  - Assess pain using appropriate pain scale  - Administer analgesics based on type and severity of pain and evaluate response  - Implement non-pharmacological measures as appropriate and evaluate response  - Consider cultural and social influences on pain and pain management  - Notify physician/advanced practitioner if interventions unsuccessful or patient reports new pain  Outcome: Progressing     Problem: INFECTION - ADULT  Goal: Absence or prevention of progression during hospitalization  Description: INTERVENTIONS:  - Assess and monitor for signs and symptoms of infection  - Monitor lab/diagnostic results  - Monitor all insertion sites, i e  indwelling lines, tubes, and drains  - Monitor endotracheal if appropriate and nasal secretions for changes in amount and color  - [place] appropriate cooling/warming therapies per order  - Administer medications as ordered  - Instruct and encourage patient and family to use good hand hygiene technique  - Identify and instruct in appropriate isolation precautions for identified infection/condition  Outcome: Progressing     Problem: DISCHARGE PLANNING  Goal: Discharge to home or other facility with appropriate resources  Description: INTERVENTIONS:  - Identify barriers to discharge w/patient and caregiver  - Arrange for needed discharge resources and transportation as appropriate  - Identify discharge learning needs (meds, wound care, etc )  - Arrange for interpretive services to assist at discharge as needed  - Refer to Case Management Department for coordinating discharge planning if the patient needs post-hospital services based on physician/advanced practitioner order or complex needs related to functional status, cognitive ability, or social support system  Outcome: Progressing     Problem: Knowledge Deficit  Goal: Patient/family/caregiver demonstrates understanding of disease process, treatment plan, medications, and discharge instructions  Description: Complete learning assessment and assess knowledge base    Interventions:  - Provide teaching at level of understanding  - Provide teaching via preferred learning methods  Outcome: Progressing     Problem: CARDIOVASCULAR - ADULT  Goal: Maintains optimal cardiac output and hemodynamic stability  Description: INTERVENTIONS:  - Monitor I/O, vital signs and rhythm  - Monitor for S/S and trends of decreased cardiac output  - Administer and titrate ordered vasoactive medications to optimize hemodynamic stability  - Assess quality of pulses, skin color and temperature  - Assess for signs of decreased coronary artery perfusion  - Instruct patient to report change in severity of symptoms  Outcome: Progressing     Problem: RESPIRATORY - ADULT  Goal: Achieves optimal ventilation and oxygenation  Description: INTERVENTIONS:  - Assess for changes in respiratory status  - Assess for changes in mentation and behavior  - Position to facilitate oxygenation and minimize respiratory effort  - Oxygen administered by appropriate delivery if ordered  - Initiate smoking cessation education as indicated  - Encourage broncho-pulmonary hygiene including cough, deep breathe, Incentive Spirometry  - Assess the need for suctioning and aspirate as needed  - Assess and instruct to report SOB or any respiratory difficulty  - Respiratory Therapy support as indicated  Outcome: Progressing     Problem: METABOLIC, FLUID AND ELECTROLYTES - ADULT  Goal: Electrolytes maintained within normal limits  Description: INTERVENTIONS:  - Monitor labs and assess patient for signs and symptoms of electrolyte imbalances  - Administer electrolyte replacement as ordered  - Monitor response to electrolyte replacements, including repeat lab results as appropriate  - Instruct patient on fluid and nutrition as appropriate  Outcome: Progressing  Goal: Fluid balance maintained  Description: INTERVENTIONS:  - Monitor labs   - Monitor I/O and WT  - Instruct patient on fluid and nutrition as appropriate  - Assess for signs & symptoms of volume excess or deficit  Outcome: Progressing

## 2022-08-07 NOTE — ASSESSMENT & PLAN NOTE
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

## 2022-08-07 NOTE — ASSESSMENT & PLAN NOTE
Moderate to large right pleural effusion  Most likely secondary to CHF, secondary to ischemic cardiomyopathy  Continue aggressive diuresis-monitor intake and output  Follow cardiology recommendation  If does not improve, consider to consulted Critical care for thoracentesis  Hold Coumadin for now

## 2022-08-08 ENCOUNTER — APPOINTMENT (INPATIENT)
Dept: RADIOLOGY | Facility: HOSPITAL | Age: 57
DRG: 194 | End: 2022-08-08
Payer: COMMERCIAL

## 2022-08-08 LAB
ANION GAP SERPL CALCULATED.3IONS-SCNC: 10 MMOL/L (ref 4–13)
ANION GAP SERPL CALCULATED.3IONS-SCNC: 11 MMOL/L (ref 4–13)
ATRIAL RATE: 97 BPM
ATRIAL RATE: 99 BPM
BUN SERPL-MCNC: 18 MG/DL (ref 5–25)
BUN SERPL-MCNC: 19 MG/DL (ref 5–25)
CALCIUM SERPL-MCNC: 8.2 MG/DL (ref 8.3–10.1)
CALCIUM SERPL-MCNC: 8.9 MG/DL (ref 8.3–10.1)
CHLORIDE SERPL-SCNC: 96 MMOL/L (ref 96–108)
CHLORIDE SERPL-SCNC: 98 MMOL/L (ref 96–108)
CO2 SERPL-SCNC: 27 MMOL/L (ref 21–32)
CO2 SERPL-SCNC: 28 MMOL/L (ref 21–32)
CREAT SERPL-MCNC: 0.82 MG/DL (ref 0.6–1.3)
CREAT SERPL-MCNC: 1.13 MG/DL (ref 0.6–1.3)
GFR SERPL CREATININE-BSD FRML MDRD: 72 ML/MIN/1.73SQ M
GFR SERPL CREATININE-BSD FRML MDRD: 99 ML/MIN/1.73SQ M
GLUCOSE SERPL-MCNC: 110 MG/DL (ref 65–140)
GLUCOSE SERPL-MCNC: 132 MG/DL (ref 65–140)
MAGNESIUM SERPL-MCNC: 2 MG/DL (ref 1.6–2.6)
MAGNESIUM SERPL-MCNC: 2.1 MG/DL (ref 1.6–2.6)
P AXIS: 33 DEGREES
P AXIS: 39 DEGREES
POTASSIUM SERPL-SCNC: 2.6 MMOL/L (ref 3.5–5.3)
POTASSIUM SERPL-SCNC: 3 MMOL/L (ref 3.5–5.3)
POTASSIUM SERPL-SCNC: 3.1 MMOL/L (ref 3.5–5.3)
PR INTERVAL: 170 MS
PR INTERVAL: 170 MS
QRS AXIS: 100 DEGREES
QRS AXIS: 84 DEGREES
QRSD INTERVAL: 116 MS
QRSD INTERVAL: 122 MS
QT INTERVAL: 376 MS
QT INTERVAL: 396 MS
QTC INTERVAL: 477 MS
QTC INTERVAL: 508 MS
SODIUM SERPL-SCNC: 134 MMOL/L (ref 135–147)
SODIUM SERPL-SCNC: 136 MMOL/L (ref 135–147)
T WAVE AXIS: -21 DEGREES
T WAVE AXIS: 8 DEGREES
VENTRICULAR RATE: 97 BPM
VENTRICULAR RATE: 99 BPM

## 2022-08-08 PROCEDURE — 71045 X-RAY EXAM CHEST 1 VIEW: CPT

## 2022-08-08 PROCEDURE — 99232 SBSQ HOSP IP/OBS MODERATE 35: CPT | Performed by: FAMILY MEDICINE

## 2022-08-08 PROCEDURE — 80048 BASIC METABOLIC PNL TOTAL CA: CPT

## 2022-08-08 PROCEDURE — 93005 ELECTROCARDIOGRAM TRACING: CPT

## 2022-08-08 PROCEDURE — 84132 ASSAY OF SERUM POTASSIUM: CPT

## 2022-08-08 PROCEDURE — 83735 ASSAY OF MAGNESIUM: CPT

## 2022-08-08 PROCEDURE — 80048 BASIC METABOLIC PNL TOTAL CA: CPT | Performed by: FAMILY MEDICINE

## 2022-08-08 RX ORDER — POTASSIUM CHLORIDE 20 MEQ/1
20 TABLET, EXTENDED RELEASE ORAL 2 TIMES DAILY
Status: DISCONTINUED | OUTPATIENT
Start: 2022-08-08 | End: 2022-08-11 | Stop reason: HOSPADM

## 2022-08-08 RX ORDER — LANOLIN ALCOHOL/MO/W.PET/CERES
6 CREAM (GRAM) TOPICAL
Status: DISCONTINUED | OUTPATIENT
Start: 2022-08-08 | End: 2022-08-11 | Stop reason: HOSPADM

## 2022-08-08 RX ORDER — POTASSIUM CHLORIDE 14.9 MG/ML
20 INJECTION INTRAVENOUS
Status: COMPLETED | OUTPATIENT
Start: 2022-08-08 | End: 2022-08-08

## 2022-08-08 RX ORDER — POTASSIUM CHLORIDE 20 MEQ/1
60 TABLET, EXTENDED RELEASE ORAL ONCE
Status: COMPLETED | OUTPATIENT
Start: 2022-08-08 | End: 2022-08-08

## 2022-08-08 RX ORDER — POTASSIUM CHLORIDE 20 MEQ/1
40 TABLET, EXTENDED RELEASE ORAL ONCE
Status: COMPLETED | OUTPATIENT
Start: 2022-08-08 | End: 2022-08-08

## 2022-08-08 RX ORDER — LORAZEPAM 0.5 MG/1
0.25 TABLET ORAL ONCE
Status: COMPLETED | OUTPATIENT
Start: 2022-08-08 | End: 2022-08-08

## 2022-08-08 RX ORDER — FUROSEMIDE 10 MG/ML
80 INJECTION INTRAMUSCULAR; INTRAVENOUS
Status: DISCONTINUED | OUTPATIENT
Start: 2022-08-08 | End: 2022-08-09

## 2022-08-08 RX ADMIN — CLOPIDOGREL BISULFATE 75 MG: 75 TABLET ORAL at 08:40

## 2022-08-08 RX ADMIN — FUROSEMIDE 80 MG: 10 INJECTION, SOLUTION INTRAMUSCULAR; INTRAVENOUS at 16:59

## 2022-08-08 RX ADMIN — Medication 6 MG: at 21:15

## 2022-08-08 RX ADMIN — ATORVASTATIN CALCIUM 80 MG: 40 TABLET, FILM COATED ORAL at 08:38

## 2022-08-08 RX ADMIN — POTASSIUM CHLORIDE 40 MEQ: 1500 TABLET, EXTENDED RELEASE ORAL at 07:15

## 2022-08-08 RX ADMIN — POTASSIUM CHLORIDE 20 MEQ: 14.9 INJECTION, SOLUTION INTRAVENOUS at 09:42

## 2022-08-08 RX ADMIN — Medication 6 MG: at 00:56

## 2022-08-08 RX ADMIN — METOLAZONE 2.5 MG: 5 TABLET ORAL at 08:38

## 2022-08-08 RX ADMIN — LORAZEPAM 0.25 MG: 0.5 TABLET ORAL at 21:15

## 2022-08-08 RX ADMIN — POTASSIUM CHLORIDE 20 MEQ: 14.9 INJECTION, SOLUTION INTRAVENOUS at 07:09

## 2022-08-08 RX ADMIN — POTASSIUM CHLORIDE 60 MEQ: 1500 TABLET, EXTENDED RELEASE ORAL at 16:59

## 2022-08-08 RX ADMIN — FUROSEMIDE 80 MG: 10 INJECTION, SOLUTION INTRAMUSCULAR; INTRAVENOUS at 10:46

## 2022-08-08 RX ADMIN — POTASSIUM CHLORIDE 20 MEQ: 1500 TABLET, EXTENDED RELEASE ORAL at 17:00

## 2022-08-08 NOTE — CONSULTS
Consultation - Cardiology   Preet Garcia 56 y o  male MRN: [id number]  Unit/Bed#: -01 Encounter: [id number]    Assessment/Plan     Assessment:  Acute on chronic HFrEF with R pleural effusion   - on lasix 80 IV BID sp metolazone yesterday   Ischemic cardiomyopathy- on BB but not losartan currently  CAD sp STEMI 4/10/22 sp PCI To LAD and PCI to prox circ  Apical akinesis on coumadin - currently on hold due to possible thoracentesis   PAD  Smoking    Plan:  1  Replace potassium  2  Continue lasix 80 IV BID  3  Repeat CXR  Resume anticoagulation when appropriate if no plan for thoracentesis   4  Resume losartan when diuresis complete  5  Repeat limited echocardiogram likely tomorrow when closer to euvolemic    History of Present Illness   Physician Requesting Consult: Steve Mccracken MD  Reason for Consult / Principal Problem: pleural effusion and acute on chronic HFrEF  HPI: Preet Garcia is a 56y o  year old male with history of PAD, smoking, CHF who presented to the hospital for concerns with H[place]IL[place] and leg pains  Pt reports that his legs have been like "loaves of bread" and he gained weight in the last few weeks  He did up titrate his home lasix without improvement in symptoms  He reports his breathing was very difficult with ambulation  He reports those symptoms are actually NOT what brought him to the ED, instead it was his calf cramping  He is currently following with vascular surgery at [facility] and states his pain with ambulation is unbearable  On admission he was found to have elevated bnp and a large R sided pleural effusion  He is not requiring oxygen currently  His anticoagulation is on hold for possible thoracentesis  Patient has a history of anterior lateral STEMI on April 10, 2022 to [facility]  Patient noted chest pain 3 days prior to presentation  Patient was sent urgently to the cath lab found to have 100% occlusion in LAD   PCI to the LAD was performed in patient continued to have chest pain and elevated cardiac enzymes  Patient was taken back to the cath lab and subsequently had PCI to proximal circ extending to 2 OM vessels  Echocardiogram showed LVEF of 25% with extensive apical akinesis  Patient was started on Lovenox bridging to Coumadin to prevent thrombus formation given apical akinesis  Blood pressure was 80/50 but asymptomatic he underwent RHC which showed elevated left sided filling pressures and he was recommended to start ace  He did but developed coughing  He then started losartan  He did not notice a change in his breathing as a result of this  He was not on losartan on admission  Inpatient consult to Cardiology  Consult performed by: [place], PA-C  Consult ordered by: Marco Rose MD          Review of Systems   Constitutional: Positive for unexpected weight change  Negative for chills and diaphoresis  Respiratory: Positive for chest tightness and shortness of breath  Cardiovascular: Positive for leg swelling  Negative for chest pain and palpitations  Gastrointestinal: Negative for nausea  Genitourinary: Negative for difficulty urinating  Musculoskeletal: Positive for myalgias  Skin: Negative for color change, pallor and rash  Neurological: Negative for dizziness, syncope, weakness and light-headedness  Psychiatric/Behavioral: Negative for agitation         Historical Information   Past Medical History:   Diagnosis Date    CHF [facility]congestive heart failure) ([facility] [address] )     Coronary artery disease     MI, old      Past Surgical History:   Procedure Laterality Date    ABDOMINAL SURGERY      CORONARY ANGIOPLASTY WITH STENT PLACEMENT  04/10/2022    x 2     Social History     Substance and Sexual Activity   Alcohol Use Yes    Comment: rarely     Social History     Substance and Sexual Activity   Drug Use Not Currently     E-Cigarette/Vaping    E-Cigarette Use Never User      E-Cigarette/Vaping Substances     Social History     Tobacco Use   Smoking Status Current Every Day Smoker    Packs/day: 0 25    Types: Cigarettes   Smokeless Tobacco Never Used     Family History: non-contributory    Meds/Allergies   all current active meds have been reviewed  No Known Allergies    Objective   Vitals: Blood pressure 92/66, pulse 90, temperature 97 7 °F (36 5 °C), resp  rate 18, height 5' 6" (1 676 m), weight 70 4 kg (155 lb 3 2 oz), SpO2 93 %  Orthostatic Blood Pressures    Flowsheet Row Most Recent Value   Blood Pressure 92/66 filed at 08/08/2022 8299   Patient Position - Orthostatic VS Lying filed at 08/06/2022 1620            Intake/Output Summary (Last 24 hours) at 8/8/2022 0900  Last data filed at 8/8/2022 0854  Gross per 24 hour   Intake 600 ml   Output 5600 ml   Net -5000 ml       Invasive Devices  Report    Peripheral Intravenous Line  Duration           Peripheral IV 08/06/22 Right Antecubital 1 day                Physical Exam  Constitutional:       Appearance: Normal appearance  HENT:      Head: Normocephalic and atraumatic  Cardiovascular:      Rate and Rhythm: Normal rate  Heart sounds: No murmur heard  No gallop  Pulmonary:      Effort: Pulmonary effort is normal       Comments: diminished breath sounds in R lung base  Abdominal:      Palpations: Abdomen is soft  Musculoskeletal:         General: Swelling present  Cervical back: Neck supple  Skin:     General: Skin is warm and dry  Capillary Refill: Capillary refill takes less than 2 seconds  Neurological:      General: No focal deficit present  Mental Status: He is alert and oriented to person, place, and time  Psychiatric:         Mood and Affect: Mood normal          Thought Content: Thought content normal          Lab Results:   I have personally reviewed pertinent lab results      CBC with diff:   Results from last 7 days   Lab Units 08/07/22  0451   WBC Thousand/uL 5 90   RBC Million/uL 4 25   HEMOGLOBIN g/dL 10 2*   HEMATOCRIT % 34 4*   MCV fL 81*   MCH pg 24 0*   MCHC g/dL 29 7*   RDW % 18 1*   MPV fL 9 3   PLATELETS Thousands/uL 293     CMP:   Results from last 7 days   Lab Units 08/08/22  0517 08/07/22  0451   SODIUM mmol/L 136 136   CHLORIDE mmol/L 98 101   CO2 mmol/L 27 22   BUN mg/dL 18 18   CREATININE mg/dL 0 82 0 87   CALCIUM mg/dL 8 2* 8 5   AST U/L  --  40   ALT U/L  --  65   ALK PHOS U/L  --  276*   EGFR ml/min/1 73sq m 99 97     HS Troponin:   0   Lab Value Date/Time    HSTNI0 15 08/06/2022 1246    HSTNI2 15 08/06/2022 1358    HSTNI4 15 08/06/2022 1728     BNP:   Results from last 7 days   Lab Units 08/08/22  0517   POTASSIUM mmol/L 2 6*   CHLORIDE mmol/L 98   CO2 mmol/L 27   BUN mg/dL 18   CREATININE mg/dL 0 82   CALCIUM mg/dL 8 2*   EGFR ml/min/1 73sq m 99     Coags:   Results from last 7 days   Lab Units 08/06/22  1358   INR  2 91*     TSH:   Results from last 7 days   Lab Units 08/06/22  1246   TSH 3RD GENERATON uIU/mL 2 238     Magnesium:   Results from last 7 days   Lab Units 08/08/22  0517   MAGNESIUM mg/dL 2 1     Lipid Profile:     Imaging: I have personally reviewed pertinent reports      EKG: Sinus rhythm with occasional Premature ventricular complexes  Possible Left atrial enlargement  Left ventricular hypertrophy with QRS widening ( Og product )  Nonspecific ST and T wave abnormality  Abnormal ECG  When compared with ECG of 06-AUG-2022 12:43, (unconfirmed)  Fusion complexes are no longer Present  Confirmed by Remartín Pinto ([id number]) on 8/8/2022 9:32:33 AM

## 2022-08-08 NOTE — ASSESSMENT & PLAN NOTE
Moderate to large right pleural effusion  Most likely secondary to CHF, secondary to ischemic cardiomyopathy  Continue aggressive diuresis-monitor intake and output  Follow cardiology recommendation  If does not improve, consider to consulted Critical care for thoracentesis  Hold Coumadin for now  Repeat chest x-ray in 24-48 hours

## 2022-08-08 NOTE — PLAN OF CARE
Problem: CARDIOVASCULAR - ADULT  Goal: Maintains optimal cardiac output and hemodynamic stability  Description: INTERVENTIONS:  - Monitor I/O, vital signs and rhythm  - Monitor for S/S and trends of decreased cardiac output  - Administer and titrate ordered vasoactive medications to optimize hemodynamic stability  - Assess quality of pulses, skin color and temperature  - Assess for signs of decreased coronary artery perfusion  - Instruct patient to report change in severity of symptoms  Outcome: Progressing     Problem: RESPIRATORY - ADULT  Goal: Achieves optimal ventilation and oxygenation  Description: INTERVENTIONS:  - Assess for changes in respiratory status  - Assess for changes in mentation and behavior  - Position to facilitate oxygenation and minimize respiratory effort  - Oxygen administered by appropriate delivery if ordered  - Initiate smoking cessation education as indicated  - Encourage broncho-pulmonary hygiene including cough, deep breathe, Incentive Spirometry  - Assess the need for suctioning and aspirate as needed  - Assess and instruct to report SOB or any respiratory difficulty  - Respiratory Therapy support as indicated  Outcome: Progressing     Problem: Potential for Falls  Goal: Patient will remain free of falls  Description: INTERVENTIONS:  - Educate patient/family on patient safety including physical limitations  - Instruct patient to call for assistance with activity   - Consult OT/PT to assist with strengthening/mobility   - Keep Call bell within reach  - Keep bed low and locked with side rails adjusted as appropriate  - Keep care items and personal belongings within reach  - Initiate and maintain comfort rounds  - Make Fall Risk Sign visible to staff  - Offer Toileting every 2 Hours, in advance of need  - Consider moving patient to room near nurses station  Outcome: Progressing

## 2022-08-08 NOTE — ASSESSMENT & PLAN NOTE
Symptomatic with claudication-has mottling around the knee joint area on clinical exam  Pedal pulse is faintly palpable with Doppler   Status post aortobifemoral bypass surgery  Patient is still active smoker  Patient follows up with vascular surgery at [address] [place] [address] post arterial duplex on 08/05/2022:  Patent right limp with abnormal flow demonstrated  50-69% stenosis of the right common femoral artery, occlusion of the right superficial femur with distal recanalization  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      Case discuss by ER provider with on-call vascular surgeon Dr Vinny Mohr-continue conservative management pain control, no urgent inpatient procedure required

## 2022-08-08 NOTE — PLAN OF CARE
Problem: Potential for Falls  Goal: Patient will remain free of falls  Description: INTERVENTIONS:  - Educate patient/family on patient safety including physical limitations  - Instruct patient to call for assistance with activity   - Consult OT/PT to assist with strengthening/mobility   - Keep Call bell within reach  - Keep bed low and locked with side rails adjusted as appropriate  - Keep care items and personal belongings within reach  - Initiate and maintain comfort rounds  - Make Fall Risk Sign visible to staff  - Offer Toileting every 2 Hours, in advance of need  - Consider moving patient to room near nurses station  Outcome: Progressing     Problem: PAIN - ADULT  Goal: Verbalizes/displays adequate comfort level or baseline comfort level  Description: Interventions:  - Encourage patient to monitor pain and request assistance  - Assess pain using appropriate pain scale  - Administer analgesics based on type and severity of pain and evaluate response  - Implement non-pharmacological measures as appropriate and evaluate response  - Consider cultural and social influences on pain and pain management  - Notify physician/advanced practitioner if interventions unsuccessful or patient reports new pain  Outcome: Progressing     Problem: INFECTION - ADULT  Goal: Absence or prevention of progression during hospitalization  Description: INTERVENTIONS:  - Assess and monitor for signs and symptoms of infection  - Monitor lab/diagnostic results  - Monitor all insertion sites, i e  indwelling lines, tubes, and drains  - Monitor endotracheal if appropriate and nasal secretions for changes in amount and color  - [place] appropriate cooling/warming therapies per order  - Administer medications as ordered  - Instruct and encourage patient and family to use good hand hygiene technique  - Identify and instruct in appropriate isolation precautions for identified infection/condition  Outcome: Progressing     Problem: Knowledge Spoke with Darrin Womack. They put off an infusion treatment d/t risks of covid seeming higher than risk of missing the treatment a month ago.   Now covid #s have calmed down, they have a controlled environment in the office, plan is for cystoscopy with Dr. Avelina Henriquez Deficit  Goal: Patient/family/caregiver demonstrates understanding of disease process, treatment plan, medications, and discharge instructions  Description: Complete learning assessment and assess knowledge base    Interventions:  - Provide teaching at level of understanding  - Provide teaching via preferred learning methods  Outcome: Progressing     Problem: CARDIOVASCULAR - ADULT  Goal: Maintains optimal cardiac output and hemodynamic stability  Description: INTERVENTIONS:  - Monitor I/O, vital signs and rhythm  - Monitor for S/S and trends of decreased cardiac output  - Administer and titrate ordered vasoactive medications to optimize hemodynamic stability  - Assess quality of pulses, skin color and temperature  - Assess for signs of decreased coronary artery perfusion  - Instruct patient to report change in severity of symptoms  Outcome: Progressing     Problem: RESPIRATORY - ADULT  Goal: Achieves optimal ventilation and oxygenation  Description: INTERVENTIONS:  - Assess for changes in respiratory status  - Assess for changes in mentation and behavior  - Position to facilitate oxygenation and minimize respiratory effort  - Oxygen administered by appropriate delivery if ordered  - Initiate smoking cessation education as indicated  - Encourage broncho-pulmonary hygiene including cough, deep breathe, Incentive Spirometry  - Assess the need for suctioning and aspirate as needed  - Assess and instruct to report SOB or any respiratory difficulty  - Respiratory Therapy support as indicated  Outcome: Progressing  Problem: METABOLIC, FLUID AND ELECTROLYTES - ADULT  Goal: Electrolytes maintained within normal limits  Description: INTERVENTIONS:  - Monitor labs and assess patient for signs and symptoms of electrolyte imbalances  - Administer electrolyte replacement as ordered  - Monitor response to electrolyte replacements, including repeat lab results as appropriate  - Instruct patient on fluid and nutrition as appropriate  Outcome: Progressing

## 2022-08-08 NOTE — PROGRESS NOTES
[address]  Progress Note - Clarissa Flores 1965, 56 y o  male MRN: [id number]  Unit/Bed#: -01 Encounter: [id number]  Primary Care Provider: Julio Day DO   Date and time admitted to hospital: 8/6/2022 12:31 PM    * Acute on chronic systolic CHF (congestive heart failure) ([address] )  Assessment & Plan  Wt Readings from Last 3 Encounters:   08/08/22 70 4 kg (155 lb 3 2 oz)   05/31/22 69 2 kg (152 lb 8 9 oz)   04/19/22 65 6 kg (144 lb 10 oz)     Status post cardiac catheterization at [facility] recently which shows:PCI to the LAD due to 100% occlusion-since patient was continued to have chest pain and elevated cardiac enzymes  Patient was taken back to the cath lab and subsequently had PCI to proximal circ extending to 2 OM vessels  Echocardiogram showed LVEF of 25% with extensive apical akinesis  Patient is supposed to take 40 mg Lasix b i d , received 80 mg IV in the ER, continue on Lasix 80 mg IV b i d  and also add metolazone 2 5 mg daily and patient's diuresing very well now  Weight is down by 9 lb to 155 lb  Will give another dose of metolazone today and then stop and reassess  Continue home medication  Will add Aldactone since patient ejection fraction is 25%  Follow cardiology consult  Maintain intake and output, low-sodium diet  Patient was started on  Coumadin to prevent thrombus formation given apical akinesis-after recent cardiac catheterization at [facility]  Will hold temporarily in case thoracentesis is needed            Pleural effusion, right  Assessment & Plan  Moderate to large right pleural effusion  Most likely secondary to CHF, secondary to ischemic cardiomyopathy  Continue aggressive diuresis-monitor intake and output  Follow cardiology recommendation  If does not improve, consider to consulted Critical care for thoracentesis  Hold Coumadin for now  Repeat chest x-ray in 24-48 hours    Tobacco abuse  Assessment & Plan  Nicoderm patch    Counseled on smoking cessation    History of COVID-19  Assessment & Plan  Recent history of COVID on 05/31/2022    PAD (peripheral artery disease) (HCC)  Assessment & Plan  Symptomatic with claudication-has mottling around the knee joint area on clinical exam  Pedal pulse is faintly palpable with Doppler   Status post aortobifemoral bypass surgery  Patient is still active smoker  Patient follows up with vascular surgery at [address] [place] [address] post arterial duplex on 08/05/2022:  Patent right limp with abnormal flow demonstrated  50-69% stenosis of the right common femoral artery, occlusion of the right superficial femur with distal recanalization  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  Case discuss by ER provider with on-call vascular surgeon [facility], Dr Sherrill Mayer-continue conservative management pain control, no urgent inpatient procedure required    Hypokalemia:  Probably secondary to excessive diuresis  Will replace with potassium and recheck BMP in the afternoon  VTE Pharmacologic Prophylaxis:   Pharmacologic: Pharmacologic VTE Prophylaxis contraindicated due to Anticoagulation on hold  Mechanical VTE Prophylaxis in Place: Yes    Patient Centered Rounds: I have performed bedside rounds with nursing staff today  Discussions with Specialists or Other Care Team Provider:  Discussed with Cardiology    Education and Discussions with Family / Patient:  Discussed with patient at bedside will update sister    Time Spent for Care: 30 minutes  More than 50% of total time spent on counseling and coordination of care as described above  Current Length of Stay: 2 day(s)    Current Patient Status: Inpatient   Certification Statement: The patient will continue to require additional inpatient hospital stay due to Acute on chronic systolic CHF exacerbation    Discharge Plan:  Pending progress    Anticipate discharge after 48 hours    Code Status: Level 1 - Full Code      Subjective: Patient denies any chest pain today feels his breathing is improving but still gets winded  Currently off oxygen this morning  Diuresing very well lost around 9 lb in 24 hours    Objective:     Vitals:   Temp (24hrs), Av 9 °F (36 6 °C), Min:97 4 °F (36 3 °C), Max:98 6 °F (37 °C)    Temp:  [97 4 °F (36 3 °C)-98 6 °F (37 °C)] 97 5 °F (36 4 °C)  HR:  [] 104  Resp:  [14-18] 14  BP: ()/(66-84) 119/84  SpO2:  [92 %-100 %] 92 %  Body mass index is 25 05 kg/m²  Input and Output Summary (last 24 hours): Intake/Output Summary (Last 24 hours) at 2022 1244  Last data filed at 2022 0854  Gross per 24 hour   Intake 600 ml   Output 4450 ml   Net -3850 ml       Physical Exam:     Physical Exam  Vitals and nursing note reviewed  Constitutional:       Appearance: Normal appearance  HENT:      Head: Normocephalic and atraumatic  Right Ear: External ear normal       Left Ear: External ear normal       Nose: Nose normal       Mouth/Throat:      Pharynx: Oropharynx is clear  Eyes:      Pupils: Pupils are equal, round, and reactive to light  Cardiovascular:      Rate and Rhythm: Normal rate and regular rhythm  Heart sounds: Normal heart sounds  Pulmonary:      Effort: Pulmonary effort is normal       Comments: Moderate air entry bilaterally with diminished breath sounds on right base  Abdominal:      General: Bowel sounds are normal       Palpations: Abdomen is soft  Tenderness: There is no abdominal tenderness  Musculoskeletal:         General: Normal range of motion  Cervical back: Normal range of motion and neck supple  Skin:     General: Skin is warm and dry  Capillary Refill: Capillary refill takes less than 2 seconds  Neurological:      General: No focal deficit present  Mental Status: He is alert and oriented to person, place, and time     Psychiatric:         Mood and Affect: Mood normal            Additional Data:     Labs:    Results from last 7 days   Lab Units 08/07/22  0451   WBC Thousand/uL 5 90   HEMOGLOBIN g/dL 10 2*   HEMATOCRIT % 34 4*   PLATELETS Thousands/uL 293   NEUTROS PCT % 61   LYMPHS PCT % 29   MONOS PCT % 8   EOS PCT % 1     Results from last 7 days   Lab Units 08/08/22  0517 08/07/22  0451   SODIUM mmol/L 136 136   POTASSIUM mmol/L 2 6* 3 9   CHLORIDE mmol/L 98 101   CO2 mmol/L 27 22   BUN mg/dL 18 18   CREATININE mg/dL 0 82 0 87   ANION GAP mmol/L 11 13   CALCIUM mg/dL 8 2* 8 5   ALBUMIN g/dL  --  2 8*   TOTAL BILIRUBIN mg/dL  --  0 97   ALK PHOS U/L  --  276*   ALT U/L  --  65   AST U/L  --  40   GLUCOSE RANDOM mg/dL 110 93     Results from last 7 days   Lab Units 08/06/22  1358   INR  2 91*             Results from last 7 days   Lab Units 08/06/22  1246   PROCALCITONIN ng/ml 0 13           * I Have Reviewed All Lab Data Listed Above  * Additional Pertinent Lab Tests Reviewed: [address] Admission Reviewed    Imaging:    Imaging Reports Reviewed Today Include:  None  Imaging Personally Reviewed by Myself Includes:  None    Recent Cultures (last 7 days):     Results from last 7 days   Lab Units 08/06/22  1631 08/06/22  1532   BLOOD CULTURE   --  No Growth at 24 hrs  No Growth at 24 hrs     LEGIONELLA URINARY ANTIGEN  Negative  --        Last 24 Hours Medication List:   Current Facility-Administered Medications   Medication Dose Route Frequency Provider Last Rate    acetaminophen  650 mg Oral Q4H PRN Marely Odom MD      atorvastatin  80 mg Oral Daily Marely Odom MD      clopidogrel  75 mg Oral Daily Sudhir Aguirre MD      docusate sodium  100 mg Oral BID Marely Odom MD      furosemide  80 mg Intravenous BID (diuretic) BARBIE Ramos      melatonin  6 mg Oral HS BARBIE Ramos      metoprolol succinate  25 mg Oral Daily Marely Odom MD      nicotine  1 patch Transdermal Daily Marely Odom MD      oxyCODONE  5 mg Oral Q4H PRN Marely Odom MD      polyethylene glycol  17 g Oral Daily Gino Ormond, MD      potassium chloride  20 mEq Oral BID BARBIE Pradhan          Today, Patient Was Seen By: Karen Garcia MD    ** Please Note: Dictation voice to text software may have been used in the creation of this document   **

## 2022-08-08 NOTE — PHYSICAL THERAPY NOTE
Physical Therapy Screen    Patient Name: Alison JOHNSON Date: 8/8/2022     Problem List  Principal Problem:    Acute on chronic systolic CHF (congestive heart failure) ([address] )  Active Problems:    Dyspnea on exertion    PAD (peripheral artery disease) ([facility])    History of COVID-19    Chronic anticoagulation    Pleural effusion, right    Tobacco abuse    Prolonged Q-T interval on ECG       Past Medical History  Past Medical History:   Diagnosis Date    CHF (congestive heart failure) ([facility])     Coronary artery disease     MI, old         Past Surgical History  Past Surgical History:   Procedure Laterality Date    ABDOMINAL SURGERY      CORONARY ANGIOPLASTY WITH STENT PLACEMENT  04/10/2022    x 2           08/08/22 0806   PT Last Visit   PT Visit Date 08/08/22   Note Type   Note type Screen       Received order for PT consult  Chart reviewed  Pt admitted with diagnosis acute on chronic systolic CHF  Spoke with patient who reports he is at his PLOF of independent at this time  He reports ambulating in the room without difficulty  Reports his HAMILTON has been ongoing and he has a chair set up on his foyer as he often needs to rest between flights of steps (lives in a bilevel home)  Pt reports no concerns with returning home upon discharge and completing stairs  Will D/C PT services at this time as patient is at his PLOF of independent without acute care PT services warranted  Should patient's status change, please re-consult      Jimy Mariee, PT,DPT

## 2022-08-08 NOTE — ASSESSMENT & PLAN NOTE
Wt Readings from Last 3 Encounters:   08/08/22 70 4 kg (155 lb 3 2 oz)   05/31/22 69 2 kg (152 lb 8 9 oz)   04/19/22 65 6 kg (144 lb 10 oz)     Status post cardiac catheterization at [facility] recently which shows:PCI to the LAD due to 100% occlusion-since patient was continued to have chest pain and elevated cardiac enzymes  Patient was taken back to the cath lab and subsequently had PCI to proximal circ extending to 2 OM vessels  Echocardiogram showed LVEF of 25% with extensive apical akinesis  Patient is supposed to take 40 mg Lasix b i d , received 80 mg IV in the ER, continue on Lasix 80 mg IV b i d  and also add metolazone 2 5 mg daily and patient's diuresing very well now  Weight is down by 9 lb to 155 lb  Will give another dose of metolazone today and then stop and reassess  Continue home medication  Will add Aldactone since patient ejection fraction is 25%  Follow cardiology consult  Maintain intake and output, low-sodium diet  Patient was started on  Coumadin to prevent thrombus formation given apical akinesis-after recent cardiac catheterization at [facility]    Will hold temporarily in case thoracentesis is needed

## 2022-08-08 NOTE — CASE MANAGEMENT
Case Management Assessment & Discharge Planning Note    Patient name Andrey Lam  Location [address] 332/-01 MRN [id number]  : 1965 Date 2022       Current Admission Date: 2022  Current Admission Diagnosis:Acute on chronic systolic CHF (congestive heart failure) [facility])   Patient Active Problem List    Diagnosis Date Noted    Acute on chronic systolic [facility]congest[facility]) [facility] 2022    Dyspnea on exertion 2022    Pneumonia 2022    PAD (peripheral artery disease) ([facility] ) 2022    History of COVID-19 2022    Chronic anticoagulation 2022    Pleural effusion, right 2022    Tobacco abuse 2022    Prolonged Q-T interval on ECG 2022      LOS (days): 2  Geometric Mean LOS (GMLOS) (days): 3 00  Days to GMLOS:1 1     OBJECTIVE:    Risk of Unplanned Readmission Score: 14 86         Current admission status: Inpatient  Referral Reason:  (Post Acute Placement (specify)    Post Acute Home Needs (VNA/DME/Infusion)   Medications)    Preferred Pharmacy:   [address] [place] [address] [facility] [address] [place] [address],[address]  [address] [place] [id number]  Phone: [phone] Fax: [phone]    Primary Care Provider: Viktoriya Neal DO    Primary Insurance: Traci Waldron [facility]  Secondary Insurance:     ASSESSMENT:  [place] Proxies    There are no active Health Care Proxies on file         Advance Directives  Does patient have a [address] [place] [address] Avenue?: No  Was patient offered paperwork?: Yes (patient declined)  Does patient currently have a Health Care decision maker?: Yes, please see Health Care Proxy section  Does patient have Advance Directives?: No  Was patient offered paperwork?: Yes (patient declined)  Primary Contact: Shravan Benites, sister         Readmission Root Cause  30 Day Readmission: No    Patient Information  Admitted from[de-identified] Home  Mental Status: Alert  During Assessment patient was accompanied by: Not accompanied during assessment  Assessment information provided by[de-identified] Patient  Primary Caregiver: Self  Support Systems: Family members  [place] of Residence: [address] do you live in?: [facility] entry access options   Select all that apply : No steps to enter home  Type of Current Residence: Bi-level  Upon entering residence, is there a bedroom on the main floor (no further steps)?: No  A bedroom is located on the following floor levels of residence (select all that apply):: 2nd Floor  Upon entering residence, is there a bathroom on the main floor (no further steps)?: No  Indicate which floors of current residence have a bathroom (select all the apply):: 2nd Floor  Number of steps to 2nd floor from main floor: 7 (7 steps to foyer then 7 steps more to 2nd floor of bilevel)  In the last 12 months, was there a time when you were not able to pay the mortgage or rent on time?: No  In the last 12 months, how many places have you lived?: 1  In the last 12 months, was there a time when you did not have a steady place to sleep or slept in a shelter (including now)?: No  Homeless/housing insecurity resource given?: N/A  Living Arrangements: Lives w/ Extended Family  Is patient a ?: No    Activities of Daily Living Prior to Admission  Functional Status: Independent  Completes ADLs independently?: Yes  Ambulates independently?: Yes  Does patient use assisted devices?: No  Does patient currently own DME?: Yes  What DME does the patient currently own?: [employer]  Does patient have a history of Outpatient Therapy (PT/OT)?: No  Does the patient have a history of Short-Term Rehab?: No  Does patient have a history of HHC?: Yes (VN via telehealth for CHF, unknown agency, patient stopped services)  Does patient currently have [address]?: No         Patient Information Continued  Income Source: Pension/long-term  Within the past 12 months, you worried that your food would run out before you got the money to buy more : Never true  Within the past 12 months, the food you bought just didn't last and you didn't have money to get more : Never true  Food insecurity resource given?: N/A  Does patient receive dialysis treatments?: No  Does patient have a history of substance abuse?: No  Does patient have a history of Mental Health Diagnosis?: No         Means of Transportation  Means of Transport to Appts[de-identified] Drives Self  In the past 12 months, has lack of transportation kept you from medical appointments or from getting medications?: No  In the past 12 months, has lack of transportation kept you from meetings, work, or from getting things needed for daily living?: No  Was application for public transport provided?: N/A        DISCHARGE DETAILS:    Discharge planning discussed with[de-identified] patient  Freedom of Choice: Yes     CM contacted family/caregiver?: No- see comments (patient declined)             CM met with patient at the bedside,baseline information was obtained  CM discussed the role of CM in helping the patient develop a discharge plan and assist the patient in carry out their plan  Patient independent at baseline, lives with sister and spouse in bilevel home  Patient indicated he spends majority of time in lower level of home  Patient retired at age 48 as   Patient indicated he was set up with VN via telehealth for CHF but patient not happy with service so he discontinued  Patient uses [facility] for medications  CM will follow for CM discharge needs, if any

## 2022-08-08 NOTE — OCCUPATIONAL THERAPY NOTE
Occupational Therapy Screen          Patient Name: Susi Peacock  QDNVY'G Date: 8/8/2022  Problem List  Principal Problem:    Acute on chronic systolic CHF (congestive heart failure) ([address] )  Active Problems:    Dyspnea on exertion    PAD (peripheral artery disease) ([facility])    History of COVID-19    Chronic anticoagulation    Pleural effusion, right    Tobacco abuse    Prolonged Q-T interval on ECG          08/08/22 0805   Note Type   Note type Screen       OT orders received  Chart review completed  Pt admitted to [address] [place] [address] 8/6/2022 with Dx: acute on chronic systolic CHF  Spoke with Pt this AM who reports he has been completing ADLs and functional ambulation in room @ I with no difficulty noted  Pt verbalizes no concerns regarding returning home at this time  D/c OT effective 8/8/2022  If new concerns arise, please re-consult         CAITLYN Kelley/TRACI

## 2022-08-08 NOTE — UTILIZATION REVIEW
Inpatient Admission Authorization Request   NOTIFICATION OF INPATIENT ADMISSION/INPATIENT AUTHORIZATION REQUEST   SERVICING FACILITY:   [address] [place] [address]  Manolo Trujillo [address] [facility], [address]  Tax ID: [id number]  NPI: [id number]  Place of Service: Inpatient [facility]  [address]  Place of Service Code: 24     ATTENDING PROVIDER:  Attending Name and NPI#: Brittaney Persaud Md [[id number]]  Address: Manolo Trujillo [address] [place] [address], [address]  Phone: [phone]     UTILIZATION REVIEW CONTACT:  Michelle Maria, Utilization   Network Utilization Review Department  Phone: [phone]  Fax [phone]  Email: [email or web address]     PHYSICIAN ADVISORY SERVICES:  FOR [id number] REVIEW - MEDICAL NECESSITY DENIAL  Phone: [phone]  Fax: [phone]  Email: [email or web address]     TYPE OF REQUEST:  Inpatient Status     ADMISSION INFORMATION:  ADMISSION DATE/TIME: 8/6/22  3:37 PM  PATIENT DIAGNOSIS CODE/DESCRIPTION:  Pneumonia [J18 9]  Dyspnea [R06 00]  SOB (shortness of breath) [R06 02]  Pleural effusion on right [J90]  Acute on chronic congestive heart failure (HCC) [I50 9]  Arterial insufficiency of lower extremity ([address] ) [I73 9]  DISCHARGE DATE/TIME: No discharge date for patient encounter  IMPORTANT INFORMATION:  Please contact Kendra Rivas directly with any questions or concerns regarding this request  Department voicemails are confidential     Send requests for admission clinical reviews, concurrent reviews, approvals, and administrative denials due to lack of clinical to fax [phone]

## 2022-08-09 ENCOUNTER — APPOINTMENT (INPATIENT)
Dept: RADIOLOGY | Facility: HOSPITAL | Age: 57
DRG: 194 | End: 2022-08-09
Payer: COMMERCIAL

## 2022-08-09 LAB
ANION GAP SERPL CALCULATED.3IONS-SCNC: 10 MMOL/L (ref 4–13)
ANION GAP SERPL CALCULATED.3IONS-SCNC: 13 MMOL/L (ref 4–13)
BUN SERPL-MCNC: 18 MG/DL (ref 5–25)
BUN SERPL-MCNC: 22 MG/DL (ref 5–25)
CALCIUM SERPL-MCNC: 8.7 MG/DL (ref 8.3–10.1)
CALCIUM SERPL-MCNC: 9 MG/DL (ref 8.3–10.1)
CHLORIDE SERPL-SCNC: 94 MMOL/L (ref 96–108)
CHLORIDE SERPL-SCNC: 95 MMOL/L (ref 96–108)
CO2 SERPL-SCNC: 27 MMOL/L (ref 21–32)
CO2 SERPL-SCNC: 29 MMOL/L (ref 21–32)
CREAT SERPL-MCNC: 0.88 MG/DL (ref 0.6–1.3)
CREAT SERPL-MCNC: 1 MG/DL (ref 0.6–1.3)
GFR SERPL CREATININE-BSD FRML MDRD: 84 ML/MIN/1.73SQ M
GFR SERPL CREATININE-BSD FRML MDRD: 96 ML/MIN/1.73SQ M
GLUCOSE SERPL-MCNC: 142 MG/DL (ref 65–140)
GLUCOSE SERPL-MCNC: 99 MG/DL (ref 65–140)
INR PPP: 1.99 (ref 0.84–1.19)
MAGNESIUM SERPL-MCNC: 2 MG/DL (ref 1.6–2.6)
POTASSIUM SERPL-SCNC: 2.8 MMOL/L (ref 3.5–5.3)
POTASSIUM SERPL-SCNC: 3.3 MMOL/L (ref 3.5–5.3)
PROTHROMBIN TIME: 22.7 SECONDS (ref 11.6–14.5)
SODIUM SERPL-SCNC: 134 MMOL/L (ref 135–147)
SODIUM SERPL-SCNC: 134 MMOL/L (ref 135–147)

## 2022-08-09 PROCEDURE — 80048 BASIC METABOLIC PNL TOTAL CA: CPT | Performed by: FAMILY MEDICINE

## 2022-08-09 PROCEDURE — 83735 ASSAY OF MAGNESIUM: CPT | Performed by: FAMILY MEDICINE

## 2022-08-09 PROCEDURE — 85610 PROTHROMBIN TIME: CPT | Performed by: FAMILY MEDICINE

## 2022-08-09 PROCEDURE — 99232 SBSQ HOSP IP/OBS MODERATE 35: CPT | Performed by: FAMILY MEDICINE

## 2022-08-09 PROCEDURE — 71045 X-RAY EXAM CHEST 1 VIEW: CPT

## 2022-08-09 RX ORDER — POTASSIUM CHLORIDE 20 MEQ/1
40 TABLET, EXTENDED RELEASE ORAL ONCE
Status: COMPLETED | OUTPATIENT
Start: 2022-08-09 | End: 2022-08-09

## 2022-08-09 RX ORDER — HYDROXYZINE HYDROCHLORIDE 25 MG/1
25 TABLET, FILM COATED ORAL 2 TIMES DAILY
Status: DISCONTINUED | OUTPATIENT
Start: 2022-08-09 | End: 2022-08-09

## 2022-08-09 RX ORDER — WARFARIN SODIUM 2.5 MG/1
2.5 TABLET ORAL
Status: DISCONTINUED | OUTPATIENT
Start: 2022-08-09 | End: 2022-08-10

## 2022-08-09 RX ORDER — HYDROXYZINE HYDROCHLORIDE 25 MG/1
25 TABLET, FILM COATED ORAL 3 TIMES DAILY
Status: DISCONTINUED | OUTPATIENT
Start: 2022-08-09 | End: 2022-08-11 | Stop reason: HOSPADM

## 2022-08-09 RX ORDER — TORSEMIDE 20 MG/1
20 TABLET ORAL 2 TIMES DAILY
Status: DISCONTINUED | OUTPATIENT
Start: 2022-08-09 | End: 2022-08-10

## 2022-08-09 RX ORDER — LORAZEPAM 0.5 MG/1
0.5 TABLET ORAL
Status: DISCONTINUED | OUTPATIENT
Start: 2022-08-09 | End: 2022-08-10

## 2022-08-09 RX ADMIN — POTASSIUM CHLORIDE 40 MEQ: 1500 TABLET, EXTENDED RELEASE ORAL at 09:11

## 2022-08-09 RX ADMIN — HYDROXYZINE HYDROCHLORIDE 25 MG: 25 TABLET ORAL at 12:22

## 2022-08-09 RX ADMIN — POTASSIUM CHLORIDE 20 MEQ: 1500 TABLET, EXTENDED RELEASE ORAL at 08:07

## 2022-08-09 RX ADMIN — Medication 6 MG: at 21:06

## 2022-08-09 RX ADMIN — POTASSIUM CHLORIDE 20 MEQ: 1500 TABLET, EXTENDED RELEASE ORAL at 17:27

## 2022-08-09 RX ADMIN — TORSEMIDE 20 MG: 20 TABLET ORAL at 17:27

## 2022-08-09 RX ADMIN — CLOPIDOGREL BISULFATE 75 MG: 75 TABLET ORAL at 08:03

## 2022-08-09 RX ADMIN — OXYCODONE HYDROCHLORIDE 5 MG: 5 TABLET ORAL at 22:18

## 2022-08-09 RX ADMIN — WARFARIN SODIUM 2.5 MG: 2.5 TABLET ORAL at 17:27

## 2022-08-09 RX ADMIN — POTASSIUM CHLORIDE 40 MEQ: 1500 TABLET, EXTENDED RELEASE ORAL at 18:14

## 2022-08-09 RX ADMIN — FUROSEMIDE 80 MG: 10 INJECTION, SOLUTION INTRAMUSCULAR; INTRAVENOUS at 08:02

## 2022-08-09 RX ADMIN — LORAZEPAM 0.5 MG: 0.5 TABLET ORAL at 22:32

## 2022-08-09 RX ADMIN — ATORVASTATIN CALCIUM 80 MG: 40 TABLET, FILM COATED ORAL at 08:03

## 2022-08-09 RX ADMIN — HYDROXYZINE HYDROCHLORIDE 25 MG: 25 TABLET ORAL at 21:06

## 2022-08-09 RX ADMIN — POTASSIUM CHLORIDE 40 MEQ: 1500 TABLET, EXTENDED RELEASE ORAL at 08:03

## 2022-08-09 NOTE — ASSESSMENT & PLAN NOTE
Symptomatic with claudication-has mottling around the knee joint area on clinical exam  Pedal pulse is faintly palpable with Doppler   Status post aortobifemoral bypass surgery  Patient is still active smoker  Patient follows up with vascular surgery at [address] [place] [address] post arterial duplex on 08/05/2022:  Patent right limp with abnormal flow demonstrated  50-69% stenosis of the right common femoral artery, occlusion of the right superficial femur with distal recanalization  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      Case discuss by ER provider with on-call vascular surgeon Dr Jamar Trejo-continue conservative management pain control, no urgent inpatient procedure required

## 2022-08-09 NOTE — PROGRESS NOTES
Progress Note - Cardiology   Troy Mcnair 56 y o  male MRN: [id number]  Unit/Bed#: -01 Encounter: [id number]    Assessment:  Acute on chronic HFrEF with R pleural effusion              - on torsemide 20 mg PO BID   - hypokalemic   - symptomatically significantly improved     - resumed coumadin as no plan for thoracentesis  Ischemic cardiomyopathy- on BB but not losartan currently  CAD sp STEMI 4/10/22 sp PCI To LAD and PCI to prox circ  Apical akinesis on coumadin   PAD  Smoking    Plan:  1  Optimize electrolyte status  2  Agree with torsemide 20 mg PO BID  3  Recommend resuming losartan 25 mg daily for GDMT for HFrEF  4  No further changes in regimen  5  Will continue to follow  Plan to discharge tomorrow  Subjective/Objective     Subjective: pt reports feeling much better  Legs are back to normal  His breathing has improved but not back to normal yet  He is happy with improvement    Objective: now on oral diuresis  Hypokalemia persistent  Back to baseline weight  Negative 6 4 L since admission  Vitals: /74   Pulse 92   Temp 98 °F (36 7 °C)   Resp 16   Ht 5' 6" (1 676 m)   Wt 65 8 kg (145 lb 1 oz)   SpO2 92%   BMI 23 41 kg/m²   Vitals:    08/08/22 0600 08/09/22 0538   Weight: 70 4 kg (155 lb 3 2 oz) 65 8 kg (145 lb 1 oz)     Orthostatic Blood Pressures    Flowsheet Row Most Recent Value   Blood Pressure 102/74 filed at 08/09/2022 0741   Patient Position - Orthostatic VS Lying filed at 08/06/2022 1620            Intake/Output Summary (Last 24 hours) at 8/9/2022 3388  Last data filed at 8/9/2022 0500  Gross per 24 hour   Intake 700 ml   Output 2000 ml   Net -1300 ml       Invasive Devices  Report    Peripheral Intravenous Line  Duration           Peripheral IV 08/06/22 Right Antecubital 2 days                    Physical Exam  Constitutional:       Appearance: Normal appearance  HENT:      Head: Normocephalic and atraumatic  Cardiovascular:      Rate and Rhythm: Normal rate  Heart sounds: No murmur heard  No gallop  Pulmonary:      Effort: Pulmonary effort is normal       Comments: diminished breath sounds in R lung base  Abdominal:      Palpations: Abdomen is soft  Musculoskeletal:         General: trace edema     Cervical back: Neck supple  Skin:     General: Skin is warm and dry  Capillary Refill: Capillary refill takes less than 2 seconds  Neurological:      General: No focal deficit present  Mental Status: He is alert and oriented to person, place, and time  Psychiatric:         Mood and Affect: Mood normal          Thought Content: Thought content normal         Lab Results:   I have personally reviewed pertinent lab results  CBC with diff:   Results from last 7 days   Lab Units 08/07/22  0451   WBC Thousand/uL 5 90   RBC Million/uL 4 25   HEMOGLOBIN g/dL 10 2*   HEMATOCRIT % 34 4*   MCV fL 81*   MCH pg 24 0*   MCHC g/dL 29 7*   RDW % 18 1*   MPV fL 9 3   PLATELETS Thousands/uL 293     CMP:   Results from last 7 days   Lab Units 08/09/22  0539 08/08/22  0517 08/07/22  0451   SODIUM mmol/L 134*   < > 136   CHLORIDE mmol/L 95*   < > 101   CO2 mmol/L 29   < > 22   BUN mg/dL 18   < > 18   CREATININE mg/dL 0 88   < > 0 87   CALCIUM mg/dL 8 7   < > 8 5   AST U/L  --   --  40   ALT U/L  --   --  65   ALK PHOS U/L  --   --  276*   EGFR ml/min/1 73sq m 96   < > 97    < > = values in this interval not displayed       HS Troponin:   0   Lab Value Date/Time    HSTNI0 15 08/06/2022 1246    HSTNI2 15 08/06/2022 1358    HSTNI4 15 08/06/2022 1728     BNP:   Results from last 7 days   Lab Units 08/09/22  0539   POTASSIUM mmol/L 2 8*   CHLORIDE mmol/L 95*   CO2 mmol/L 29   BUN mg/dL 18   CREATININE mg/dL 0 88   CALCIUM mg/dL 8 7   EGFR ml/min/1 73sq m 96     Coags:   Results from last 7 days   Lab Units 08/09/22  0539   INR  1 99*     TSH:   Results from last 7 days   Lab Units 08/06/22  1246   TSH 3RD GENERATON uIU/mL 2 238     Magnesium:   Results from last 7 days   Lab Units 08/09/22  0539   MAGNESIUM mg/dL 2 0     Lipid Profile:     Imaging: I have personally reviewed pertinent reports      EKG: Sinus rhythm with occasional Premature ventricular complexes  Possible Left atrial enlargement  Left ventricular hypertrophy with QRS widening ( [place] product )  Nonspecific ST and T wave abnormality  Abnormal ECG  When compared with ECG of 06-AUG-2022 12:43, (unconfirmed)  Fusion complexes are no longer Present  Confirmed by Keith Cardenas ([id number]) on 8/8/2022 9:32:33 AM

## 2022-08-09 NOTE — PLAN OF CARE
Problem: Potential for Falls  Goal: Patient will remain free of falls  Description: INTERVENTIONS:  - Educate patient/family on patient safety including physical limitations  - Instruct patient to call for assistance with activity   - Consult OT/PT to assist with strengthening/mobility   - Keep Call bell within reach  - Keep bed low and locked with side rails adjusted as appropriate  - Keep care items and personal belongings within reach  - Initiate and maintain comfort rounds  - Make Fall Risk Sign visible to staff  - Offer Toileting every 2 Hours, in advance of need  - Consider moving patient to room near nurses station  Outcome: Progressing     Problem: PAIN - ADULT  Goal: Verbalizes/displays adequate comfort level or baseline comfort level  Description: Interventions:  - Encourage patient to monitor pain and request assistance  - Assess pain using appropriate pain scale  - Administer analgesics based on type and severity of pain and evaluate response  - Implement non-pharmacological measures as appropriate and evaluate response  - Consider cultural and social influences on pain and pain management  - Notify physician/advanced practitioner if interventions unsuccessful or patient reports new pain  Outcome: Progressing     Problem: INFECTION - ADULT  Goal: Absence or prevention of progression during hospitalization  Description: INTERVENTIONS:  - Assess and monitor for signs and symptoms of infection  - Monitor lab/diagnostic results  - Monitor all insertion sites, i e  indwelling lines, tubes, and drains  - Monitor endotracheal if appropriate and nasal secretions for changes in amount and color  - [place] appropriate cooling/warming therapies per order  - Administer medications as ordered  - Instruct and encourage patient and family to use good hand hygiene technique  - Identify and instruct in appropriate isolation precautions for identified infection/condition  Outcome: Progressing     Problem: DISCHARGE PLANNING  Goal: Discharge to home or other facility with appropriate resources  Description: INTERVENTIONS:  - Identify barriers to discharge w/patient and caregiver  - Arrange for needed discharge resources and transportation as appropriate  - Identify discharge learning needs (meds, wound care, etc )  - Arrange for interpretive services to assist at discharge as needed  - Refer to Case Management Department for coordinating discharge planning if the patient needs post-hospital services based on physician/advanced practitioner order or complex needs related to functional status, cognitive ability, or social support system  Outcome: Progressing     Problem: Knowledge Deficit  Goal: Patient/family/caregiver demonstrates understanding of disease process, treatment plan, medications, and discharge instructions  Description: Complete learning assessment and assess knowledge base    Interventions:  - Provide teaching at level of understanding  - Provide teaching via preferred learning methods  Outcome: Progressing     Problem: CARDIOVASCULAR - ADULT  Goal: Maintains optimal cardiac output and hemodynamic stability  Description: INTERVENTIONS:  - Monitor I/O, vital signs and rhythm  - Monitor for S/S and trends of decreased cardiac output  - Administer and titrate ordered vasoactive medications to optimize hemodynamic stability  - Assess quality of pulses, skin color and temperature  - Assess for signs of decreased coronary artery perfusion  - Instruct patient to report change in severity of symptoms  Outcome: Progressing     Problem: RESPIRATORY - ADULT  Goal: Achieves optimal ventilation and oxygenation  Description: INTERVENTIONS:  - Assess for changes in respiratory status  - Assess for changes in mentation and behavior  - Position to facilitate oxygenation and minimize respiratory effort  - Oxygen administered by appropriate delivery if ordered  - Initiate smoking cessation education as indicated  - Encourage broncho-pulmonary hygiene including cough, deep breathe, Incentive Spirometry  - Assess the need for suctioning and aspirate as needed  - Assess and instruct to report SOB or any respiratory difficulty  - Respiratory Therapy support as indicated  Outcome: Progressing     Problem: METABOLIC, FLUID AND ELECTROLYTES - ADULT  Goal: Electrolytes maintained within normal limits  Description: INTERVENTIONS:  - Monitor labs and assess patient for signs and symptoms of electrolyte imbalances  - Administer electrolyte replacement as ordered  - Monitor response to electrolyte replacements, including repeat lab results as appropriate  - Instruct patient on fluid and nutrition as appropriate  Outcome: Progressing  Goal: Fluid balance maintained  Description: INTERVENTIONS:  - Monitor labs   - Monitor I/O and WT  - Instruct patient on fluid and nutrition as appropriate  - Assess for signs & symptoms of volume excess or deficit  Outcome: Progressing

## 2022-08-09 NOTE — PROGRESS NOTES
[address]  Progress Note - Janna Ohara 1965, 56 y o  male MRN: [id number]  Unit/Bed#: [facility]-01 Encounter: [id number]  Primary Care Provider: Moustapha Villalta DO   Date and time admitted to hospital: 8/6/2022 12:31 PM    * Acute on chronic systolic CHF (congestive heart failure) ([address] )  Assessment & Plan  Wt Readings from Last 3 Encounters:   08/09/22 65 8 kg (145 lb 1 oz)   05/31/22 69 2 kg (152 lb 8 9 oz)   04/19/22 65 6 kg (144 lb 10 oz)     Status post cardiac catheterization at [facility] recently which shows:PCI to the LAD due to 100% occlusion-since patient was continued to have chest pain and elevated cardiac enzymes  Patient was taken back to the cath lab and subsequently had PCI to proximal circ extending to 2 OM vessels  Echocardiogram showed LVEF of 25% with extensive apical akinesis  Patient is supposed to take 40 mg Lasix b i d , received 80 mg IV in the ER, continue on Lasix 80 mg IV b i d  and also add metolazone 2 5 mg daily and patient's diuresing very well now  Weight is down by 9 lb to 155 lb  Will give another dose of metolazone today and then stop and reassess  Continue home medication  Will add Aldactone since patient ejection fraction is 25%  Follow cardiology consult  Maintain intake and output, low-sodium diet  Patient was started on  Coumadin to prevent thrombus formation given apical akinesis-after recent cardiac catheterization at [facility]  Restart coumadin  Patient has diuresed well and hence no plan for thoracentesis at this time            Pleural effusion, right  Assessment & Plan  Moderate to large right pleural effusion  Most likely secondary to CHF, secondary to ischemic cardiomyopathy  Continue aggressive diuresis-monitor intake and output  Follow cardiology recommendation  Repeat chest x-ray reviewed    Tobacco abuse  Assessment & Plan  Nicoderm patch    Counseled on smoking cessation    Chronic anticoagulation  Assessment & Plan  Patient was started on Coumadin to prevent thrombus formation given apical akinesis-after recent cardiac catheterization at [facility]  INR is therapeutic, 1 9  Restart Coumadin 2 5 mg daily    History of COVID-19  Assessment & Plan  Recent history of COVID on 05/31/2022    PAD (peripheral artery disease) ([facility])  Assessment & Plan  Symptomatic with claudication-has mottling around the knee joint area on clinical exam  Pedal pulse is faintly palpable with Doppler   Status post aortobifemoral bypass surgery  Patient is still active smoker  Patient follows up with vascular surgery at [address] [place] [address] post arterial duplex on 08/05/2022:  Patent right limp with abnormal flow demonstrated  50-69% stenosis of the right common femoral artery, occlusion of the right superficial femur with distal recanalization  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  Case discuss by ER provider with on-call vascular surgeon had [facility], Dr Derick Mayer-continue conservative management pain control, no urgent inpatient procedure required        VTE Pharmacologic Prophylaxis:   Pharmacologic: Warfarin (Coumadin)  Mechanical VTE Prophylaxis in Place: Yes    Patient Centered Rounds: I have performed bedside rounds with nursing staff today  Discussions with Specialists or Other Care Team Provider:  Discussed with Cardiology    Education and Discussions with Family / Patient:  Discussed with patient at bedside and update sister    Time Spent for Care: 30 minutes  More than 50% of total time spent on counseling and coordination of care as described above      Current Length of Stay: 3 day(s)    Current Patient Status: Inpatient   Certification Statement: The patient will continue to require additional inpatient hospital stay due to Acute on chronic systolic CHF exacerbation    Discharge Plan:  Anticipate discharge home tomorrow    Code Status: Level 1 - Full Code      Subjective:   Patient states that he still feels short of breath and gets anxious at night  Feels better during the day however at night is having difficulty sleeping and worsening anxiety    Objective:     Vitals:   Temp (24hrs), Av 9 °F (36 6 °C), Min:97 8 °F (36 6 °C), Max:98 °F (36 7 °C)    Temp:  [97 8 °F (36 6 °C)-98 °F (36 7 °C)] 98 °F (36 7 °C)  HR:  [92-97] 92  Resp:  [16-18] 16  BP: ()/(65-74) 102/74  SpO2:  [92 %-97 %] 94 %  Body mass index is 23 41 kg/m²  Input and Output Summary (last 24 hours): Intake/Output Summary (Last 24 hours) at 2022 1141  Last data filed at 2022 0500  Gross per 24 hour   Intake 600 ml   Output 2000 ml   Net -1400 ml       Physical Exam:     Physical Exam  Vitals and nursing note reviewed  Constitutional:       Appearance: Normal appearance  HENT:      Head: Normocephalic and atraumatic  Right Ear: External ear normal       Left Ear: External ear normal       Nose: Nose normal       Mouth/Throat:      Pharynx: Oropharynx is clear  Eyes:      Pupils: Pupils are equal, round, and reactive to light  Cardiovascular:      Rate and Rhythm: Normal rate and regular rhythm  Heart sounds: Normal heart sounds  Pulmonary:      Effort: Pulmonary effort is normal       Comments: Moderate air entry bilaterally mild decreased breath sounds bilateral bases  Abdominal:      General: Bowel sounds are normal       Palpations: Abdomen is soft  Tenderness: There is no abdominal tenderness  Musculoskeletal:         General: Normal range of motion  Cervical back: Normal range of motion and neck supple  Skin:     General: Skin is warm and dry  Capillary Refill: Capillary refill takes less than 2 seconds  Neurological:      General: No focal deficit present  Mental Status: He is alert and oriented to person, place, and time     Psychiatric:         Mood and Affect: Mood normal            Additional Data:     Labs:    Results from last 7 days   Lab Units 08/07/22  0451   WBC Thousand/uL 5 90   HEMOGLOBIN g/dL 10 2*   HEMATOCRIT % 34 4*   PLATELETS Thousands/uL 293   NEUTROS PCT % 61   LYMPHS PCT % 29   MONOS PCT % 8   EOS PCT % 1     Results from last 7 days   Lab Units 08/09/22  0539 08/08/22  0517 08/07/22  0451   SODIUM mmol/L 134*   < > 136   POTASSIUM mmol/L 2 8*   < > 3 9   CHLORIDE mmol/L 95*   < > 101   CO2 mmol/L 29   < > 22   BUN mg/dL 18   < > 18   CREATININE mg/dL 0 88   < > 0 87   ANION GAP mmol/L 10   < > 13   CALCIUM mg/dL 8 7   < > 8 5   ALBUMIN g/dL  --   --  2 8*   TOTAL BILIRUBIN mg/dL  --   --  0 97   ALK PHOS U/L  --   --  276*   ALT U/L  --   --  65   AST U/L  --   --  40   GLUCOSE RANDOM mg/dL 99   < > 93    < > = values in this interval not displayed  Results from last 7 days   Lab Units 08/09/22  0539   INR  1 99*             Results from last 7 days   Lab Units 08/06/22  1246   PROCALCITONIN ng/ml 0 13           * I Have Reviewed All Lab Data Listed Above  * Additional Pertinent Lab Tests Reviewed: [address] Admission Reviewed    Imaging:    Imaging Reports Reviewed Today Include:  Chest x-ray  Imaging Personally Reviewed by Myself Includes:  Chest x-ray    Recent Cultures (last 7 days):     Results from last 7 days   Lab Units 08/06/22  1631 08/06/22  1532   BLOOD CULTURE   --  No Growth at 48 hrs  No Growth at 48 hrs     LEGIONELLA URINARY ANTIGEN  Negative  --        Last 24 Hours Medication List:   Current Facility-Administered Medications   Medication Dose Route Frequency Provider Last Rate    acetaminophen  650 mg Oral Q4H PRN Stephania Ivey MD      atorvastatin  80 mg Oral Daily Stephania Ivey MD      clopidogrel  75 mg Oral Daily Stephania Ivey MD      docusate sodium  100 mg Oral BID Stephania Ivey MD      melatonin  6 mg Oral HS BARBIE Wen      metoprolol succinate  25 mg Oral Daily Stephania Ivey MD      nicotine  1 patch Transdermal Daily Stephania Ivey MD      oxyCODONE 5 mg Oral Q4H PRN Doni Still MD      polyethylene glycol  17 g Oral Daily Sudhir Aguirre MD      potassium chloride  20 mEq Oral BID BARBIE Metzger      torsemide  20 mg Oral BID Davion Murry MD      warfarin  2 5 mg Oral Daily (warfarin) Davion Murry MD          Today, Patient Was Seen By: Davion Murry MD    ** Please Note: Dictation voice to text software may have been used in the creation of this document   **

## 2022-08-09 NOTE — PLAN OF CARE
Problem: Potential for Falls  Goal: Patient will remain free of falls  Description: INTERVENTIONS:  - Educate patient/family on patient safety including physical limitations  - Instruct patient to call for assistance with activity   - Consult OT/PT to assist with strengthening/mobility   - Keep Call bell within reach  - Keep bed low and locked with side rails adjusted as appropriate  - Keep care items and personal belongings within reach  - Initiate and maintain comfort rounds  - Make Fall Risk Sign visible to staff  - Offer Toileting every 2 Hours, in advance of need  - Consider moving patient to room near nurses station  Outcome: Progressing     Problem: PAIN - ADULT  Goal: Verbalizes/displays adequate comfort level or baseline comfort level  Description: Interventions:  - Encourage patient to monitor pain and request assistance  - Assess pain using appropriate pain scale  - Administer analgesics based on type and severity of pain and evaluate response  - Implement non-pharmacological measures as appropriate and evaluate response  - Consider cultural and social influences on pain and pain management  - Notify physician/advanced practitioner if interventions unsuccessful or patient reports new pain  Outcome: Progressing     Problem: INFECTION - ADULT  Goal: Absence or prevention of progression during hospitalization  Description: INTERVENTIONS:  - Assess and monitor for signs and symptoms of infection  - Monitor lab/diagnostic results  - Monitor all insertion sites, i e  indwelling lines, tubes, and drains  - Monitor endotracheal if appropriate and nasal secretions for changes in amount and color  - [place] appropriate cooling/warming therapies per order  - Administer medications as ordered  - Instruct and encourage patient and family to use good hand hygiene technique  - Identify and instruct in appropriate isolation precautions for identified infection/condition  Outcome: Progressing     Problem: DISCHARGE PLANNING  Goal: Discharge to home or other facility with appropriate resources  Description: INTERVENTIONS:  - Identify barriers to discharge w/patient and caregiver  - Arrange for needed discharge resources and transportation as appropriate  - Identify discharge learning needs (meds, wound care, etc )  - Arrange for interpretive services to assist at discharge as needed  - Refer to Case Management Department for coordinating discharge planning if the patient needs post-hospital services based on physician/advanced practitioner order or complex needs related to functional status, cognitive ability, or social support system  Outcome: Progressing     Problem: Knowledge Deficit  Goal: Patient/family/caregiver demonstrates understanding of disease process, treatment plan, medications, and discharge instructions  Description: Complete learning assessment and assess knowledge base    Interventions:  - Provide teaching at level of understanding  - Provide teaching via preferred learning methods  Outcome: Progressing     Problem: CARDIOVASCULAR - ADULT  Goal: Maintains optimal cardiac output and hemodynamic stability  Description: INTERVENTIONS:  - Monitor I/O, vital signs and rhythm  - Monitor for S/S and trends of decreased cardiac output  - Administer and titrate ordered vasoactive medications to optimize hemodynamic stability  - Assess quality of pulses, skin color and temperature  - Assess for signs of decreased coronary artery perfusion  - Instruct patient to report change in severity of symptoms  Outcome: Progressing     Problem: RESPIRATORY - ADULT  Goal: Achieves optimal ventilation and oxygenation  Description: INTERVENTIONS:  - Assess for changes in respiratory status  - Assess for changes in mentation and behavior  - Position to facilitate oxygenation and minimize respiratory effort  - Oxygen administered by appropriate delivery if ordered  - Initiate smoking cessation education as indicated  - Encourage broncho-pulmonary hygiene including cough, deep breathe, Incentive Spirometry  - Assess the need for suctioning and aspirate as needed  - Assess and instruct to report SOB or any respiratory difficulty  - Respiratory Therapy support as indicated  Outcome: Progressing     Problem: METABOLIC, FLUID AND ELECTROLYTES - ADULT  Goal: Electrolytes maintained within normal limits  Description: INTERVENTIONS:  - Monitor labs and assess patient for signs and symptoms of electrolyte imbalances  - Administer electrolyte replacement as ordered  - Monitor response to electrolyte replacements, including repeat lab results as appropriate  - Instruct patient on fluid and nutrition as appropriate  Outcome: Progressing  Goal: Fluid balance maintained  Description: INTERVENTIONS:  - Monitor labs   - Monitor I/O and WT  - Instruct patient on fluid and nutrition as appropriate  - Assess for signs & symptoms of volume excess or deficit  Outcome: Progressing

## 2022-08-09 NOTE — PLAN OF CARE
Problem: PAIN - ADULT  Goal: Verbalizes/displays adequate comfort level or baseline comfort level  Description: Interventions:  - Encourage patient to monitor pain and request assistance  - Assess pain using appropriate pain scale  - Administer analgesics based on type and severity of pain and evaluate response  - Implement non-pharmacological measures as appropriate and evaluate response  - Consider cultural and social influences on pain and pain management  - Notify physician/advanced practitioner if interventions unsuccessful or patient reports new pain  Outcome: Progressing     Problem: Knowledge Deficit  Goal: Patient/family/caregiver demonstrates understanding of disease process, treatment plan, medications, and discharge instructions  Description: Complete learning assessment and assess knowledge base    Interventions:  - Provide teaching at level of understanding  - Provide teaching via preferred learning methods  Outcome: Progressing     Problem: CARDIOVASCULAR - ADULT  Goal: Maintains optimal cardiac output and hemodynamic stability  Description: INTERVENTIONS:  - Monitor I/O, vital signs and rhythm  - Monitor for S/S and trends of decreased cardiac output  - Administer and titrate ordered vasoactive medications to optimize hemodynamic stability  - Assess quality of pulses, skin color and temperature  - Assess for signs of decreased coronary artery perfusion  - Instruct patient to report change in severity of symptoms  Outcome: Progressing     Problem: RESPIRATORY - ADULT  Goal: Achieves optimal ventilation and oxygenation  Description: INTERVENTIONS:  - Assess for changes in respiratory status  - Assess for changes in mentation and behavior  - Position to facilitate oxygenation and minimize respiratory effort  - Oxygen administered by appropriate delivery if ordered  - Initiate smoking cessation education as indicated  - Encourage broncho-pulmonary hygiene including cough, deep breathe, Incentive Spirometry  - Assess the need for suctioning and aspirate as needed  - Assess and instruct to report SOB or any respiratory difficulty  - Respiratory Therapy support as indicated  Outcome: Progressing     Problem: METABOLIC, FLUID AND ELECTROLYTES - ADULT  Goal: Electrolytes maintained within normal limits  Description: INTERVENTIONS:  - Monitor labs and assess patient for signs and symptoms of electrolyte imbalances  - Administer electrolyte replacement as ordered  - Monitor response to electrolyte replacements, including repeat lab results as appropriate  - Instruct patient on fluid and nutrition as appropriate  Outcome: Progressing

## 2022-08-09 NOTE — ASSESSMENT & PLAN NOTE
Patient was started on Coumadin to prevent thrombus formation given apical akinesis-after recent cardiac catheterization at [facility]  INR is therapeutic, 1 9  Restart Coumadin 2 5 mg daily

## 2022-08-09 NOTE — ASSESSMENT & PLAN NOTE
Moderate to large right pleural effusion  Most likely secondary to CHF, secondary to ischemic cardiomyopathy  Continue aggressive diuresis-monitor intake and output  Follow cardiology recommendation  Repeat chest x-ray reviewed

## 2022-08-09 NOTE — ASSESSMENT & PLAN NOTE
Wt Readings from Last 3 Encounters:   08/09/22 65 8 kg (145 lb 1 oz)   05/31/22 69 2 kg (152 lb 8 9 oz)   04/19/22 65 6 kg (144 lb 10 oz)     Status post cardiac catheterization at [address] [place] [address] recently which shows:PCI to the LAD due to 100% occlusion-since patient was continued to have chest pain and elevated cardiac enzymes  Patient was taken back to the cath lab and subsequently had PCI to proximal circ extending to 2 OM vessels  Echocardiogram showed LVEF of 25% with extensive apical akinesis  Patient is supposed to take 40 mg Lasix b i d , received 80 mg IV in the ER, continue on Lasix 80 mg IV b i d  and also add metolazone 2 5 mg daily and patient's diuresing very well now  Weight is down by 9 lb to 155 lb  Will give another dose of metolazone today and then stop and reassess  Continue home medication  Will add Aldactone since patient ejection fraction is 25%  Follow cardiology consult  Maintain intake and output, low-sodium diet  Patient was started on  Coumadin to prevent thrombus formation given apical akinesis-after recent cardiac catheterization at [address] [place] [address]  Restart coumadin    Patient has diuresed well and hence no plan for thoracentesis at this time

## 2022-08-10 ENCOUNTER — APPOINTMENT (INPATIENT)
Dept: RADIOLOGY | Facility: HOSPITAL | Age: 57
DRG: 194 | End: 2022-08-10
Payer: COMMERCIAL

## 2022-08-10 LAB
ANION GAP SERPL CALCULATED.3IONS-SCNC: 11 MMOL/L (ref 4–13)
APPEARANCE FLD: NORMAL
BUN SERPL-MCNC: 23 MG/DL (ref 5–25)
CALCIUM SERPL-MCNC: 8.9 MG/DL (ref 8.3–10.1)
CHLORIDE SERPL-SCNC: 97 MMOL/L (ref 96–108)
CO2 SERPL-SCNC: 27 MMOL/L (ref 21–32)
COLOR FLD: YELLOW
CREAT SERPL-MCNC: 0.91 MG/DL (ref 0.6–1.3)
GFR SERPL CREATININE-BSD FRML MDRD: 94 ML/MIN/1.73SQ M
GLUCOSE SERPL-MCNC: 99 MG/DL (ref 65–140)
HISTIOCYTES NFR FLD: 23 %
INR PPP: 1.76 (ref 0.84–1.19)
LDH FLD L TO P-CCNC: 121 U/L
LDH SERPL-CCNC: 376 U/L (ref 81–234)
LYMPHOCYTES NFR BLD AUTO: 35 %
MONO+MESO NFR FLD MANUAL: 5 %
MONOCYTES NFR BLD AUTO: 9 %
NEUTS SEG NFR BLD AUTO: 28 %
POTASSIUM SERPL-SCNC: 3.5 MMOL/L (ref 3.5–5.3)
PROT FLD-MCNC: 2.5 G/DL
PROTHROMBIN TIME: 20.6 SECONDS (ref 11.6–14.5)
SITE: NORMAL
SODIUM SERPL-SCNC: 135 MMOL/L (ref 135–147)
TOTAL CELLS COUNTED SPEC: 100
WBC # FLD MANUAL: 595 /UL

## 2022-08-10 PROCEDURE — 0W9B3ZZ DRAINAGE OF LEFT PLEURAL CAVITY, PERCUTANEOUS APPROACH: ICD-10-PCS | Performed by: ANESTHESIOLOGY

## 2022-08-10 PROCEDURE — 85610 PROTHROMBIN TIME: CPT | Performed by: FAMILY MEDICINE

## 2022-08-10 PROCEDURE — 88305 TISSUE EXAM BY PATHOLOGIST: CPT | Performed by: PATHOLOGY

## 2022-08-10 PROCEDURE — 83615 LACTATE (LD) (LDH) ENZYME: CPT | Performed by: FAMILY MEDICINE

## 2022-08-10 PROCEDURE — 84157 ASSAY OF PROTEIN OTHER: CPT | Performed by: FAMILY MEDICINE

## 2022-08-10 PROCEDURE — 88112 CYTOPATH CELL ENHANCE TECH: CPT | Performed by: PATHOLOGY

## 2022-08-10 PROCEDURE — 71045 X-RAY EXAM CHEST 1 VIEW: CPT

## 2022-08-10 PROCEDURE — 89051 BODY FLUID CELL COUNT: CPT | Performed by: FAMILY MEDICINE

## 2022-08-10 PROCEDURE — 32555 ASPIRATE PLEURA W/ IMAGING: CPT | Performed by: ANESTHESIOLOGY

## 2022-08-10 PROCEDURE — 80048 BASIC METABOLIC PNL TOTAL CA: CPT | Performed by: FAMILY MEDICINE

## 2022-08-10 PROCEDURE — NC001 PR NO CHARGE: Performed by: ANESTHESIOLOGY

## 2022-08-10 PROCEDURE — 99233 SBSQ HOSP IP/OBS HIGH 50: CPT | Performed by: FAMILY MEDICINE

## 2022-08-10 RX ORDER — LIDOCAINE WITH 8.4% SOD BICARB 0.9%(10ML)
10 SYRINGE (ML) INJECTION ONCE
Status: DISCONTINUED | OUTPATIENT
Start: 2022-08-10 | End: 2022-08-10

## 2022-08-10 RX ORDER — LIDOCAINE HYDROCHLORIDE 10 MG/ML
10 INJECTION, SOLUTION EPIDURAL; INFILTRATION; INTRACAUDAL; PERINEURAL ONCE
Status: COMPLETED | OUTPATIENT
Start: 2022-08-10 | End: 2022-08-10

## 2022-08-10 RX ORDER — WARFARIN SODIUM 5 MG/1
5 TABLET ORAL DAILY
Status: DISCONTINUED | OUTPATIENT
Start: 2022-08-10 | End: 2022-08-11 | Stop reason: HOSPADM

## 2022-08-10 RX ORDER — LORAZEPAM 0.5 MG/1
0.5 TABLET ORAL EVERY 6 HOURS PRN
Status: DISCONTINUED | OUTPATIENT
Start: 2022-08-10 | End: 2022-08-11 | Stop reason: HOSPADM

## 2022-08-10 RX ORDER — ALBUTEROL SULFATE 90 UG/1
2 AEROSOL, METERED RESPIRATORY (INHALATION) EVERY 4 HOURS PRN
Status: DISCONTINUED | OUTPATIENT
Start: 2022-08-10 | End: 2022-08-11 | Stop reason: HOSPADM

## 2022-08-10 RX ORDER — TORSEMIDE 20 MG/1
20 TABLET ORAL 2 TIMES DAILY
Status: DISCONTINUED | OUTPATIENT
Start: 2022-08-10 | End: 2022-08-11 | Stop reason: HOSPADM

## 2022-08-10 RX ADMIN — POTASSIUM CHLORIDE 20 MEQ: 1500 TABLET, EXTENDED RELEASE ORAL at 17:21

## 2022-08-10 RX ADMIN — HYDROXYZINE HYDROCHLORIDE 25 MG: 25 TABLET ORAL at 08:10

## 2022-08-10 RX ADMIN — Medication 6 MG: at 21:08

## 2022-08-10 RX ADMIN — LORAZEPAM 0.5 MG: 0.5 TABLET ORAL at 21:08

## 2022-08-10 RX ADMIN — TORSEMIDE 20 MG: 20 TABLET ORAL at 17:21

## 2022-08-10 RX ADMIN — CLOPIDOGREL BISULFATE 75 MG: 75 TABLET ORAL at 08:10

## 2022-08-10 RX ADMIN — GUAIFENESIN 200 MG: 200 SOLUTION ORAL at 21:08

## 2022-08-10 RX ADMIN — POTASSIUM CHLORIDE 20 MEQ: 1500 TABLET, EXTENDED RELEASE ORAL at 08:10

## 2022-08-10 RX ADMIN — LIDOCAINE HYDROCHLORIDE 10 ML: 10 INJECTION, SOLUTION EPIDURAL; INFILTRATION; INTRACAUDAL; PERINEURAL at 12:22

## 2022-08-10 RX ADMIN — WARFARIN SODIUM 5 MG: 5 TABLET ORAL at 17:21

## 2022-08-10 RX ADMIN — LORAZEPAM 0.5 MG: 0.5 TABLET ORAL at 10:40

## 2022-08-10 RX ADMIN — ATORVASTATIN CALCIUM 80 MG: 40 TABLET, FILM COATED ORAL at 08:12

## 2022-08-10 NOTE — PLAN OF CARE
Problem: Potential for Falls  Goal: Patient will remain free of falls  Description: INTERVENTIONS:  - Educate patient/family on patient safety including physical limitations  - Instruct patient to call for assistance with activity   - Consult OT/PT to assist with strengthening/mobility   - Keep Call bell within reach  - Keep bed low and locked with side rails adjusted as appropriate  - Keep care items and personal belongings within reach  - Initiate and maintain comfort rounds  - Make Fall Risk Sign visible to staff  - Offer Toileting every 2 Hours, in advance of need  - Consider moving patient to room near nurses station  Outcome: Progressing     Problem: PAIN - ADULT  Goal: Verbalizes/displays adequate comfort level or baseline comfort level  Description: Interventions:  - Encourage patient to monitor pain and request assistance  - Assess pain using appropriate pain scale  - Administer analgesics based on type and severity of pain and evaluate response  - Implement non-pharmacological measures as appropriate and evaluate response  - Consider cultural and social influences on pain and pain management  - Notify physician/advanced practitioner if interventions unsuccessful or patient reports new pain  Outcome: Progressing     Problem: INFECTION - ADULT  Goal: Absence or prevention of progression during hospitalization  Description: INTERVENTIONS:  - Assess and monitor for signs and symptoms of infection  - Monitor lab/diagnostic results  - Monitor all insertion sites, i e  indwelling lines, tubes, and drains  - Monitor endotracheal if appropriate and nasal secretions for changes in amount and color  - [place] appropriate cooling/warming therapies per order  - Administer medications as ordered  - Instruct and encourage patient and family to use good hand hygiene technique  - Identify and instruct in appropriate isolation precautions for identified infection/condition  Outcome: Progressing     Problem: DISCHARGE PLANNING  Goal: Discharge to home or other facility with appropriate resources  Description: INTERVENTIONS:  - Identify barriers to discharge w/patient and caregiver  - Arrange for needed discharge resources and transportation as appropriate  - Identify discharge learning needs (meds, wound care, etc )  - Arrange for interpretive services to assist at discharge as needed  - Refer to Case Management Department for coordinating discharge planning if the patient needs post-hospital services based on physician/advanced practitioner order or complex needs related to functional status, cognitive ability, or social support system  Outcome: Progressing     Problem: Knowledge Deficit  Goal: Patient/family/caregiver demonstrates understanding of disease process, treatment plan, medications, and discharge instructions  Description: Complete learning assessment and assess knowledge base    Interventions:  - Provide teaching at level of understanding  - Provide teaching via preferred learning methods  Outcome: Progressing     Problem: CARDIOVASCULAR - ADULT  Goal: Maintains optimal cardiac output and hemodynamic stability  Description: INTERVENTIONS:  - Monitor I/O, vital signs and rhythm  - Monitor for S/S and trends of decreased cardiac output  - Administer and titrate ordered vasoactive medications to optimize hemodynamic stability  - Assess quality of pulses, skin color and temperature  - Assess for signs of decreased coronary artery perfusion  - Instruct patient to report change in severity of symptoms  Outcome: Progressing     Problem: RESPIRATORY - ADULT  Goal: Achieves optimal ventilation and oxygenation  Description: INTERVENTIONS:  - Assess for changes in respiratory status  - Assess for changes in mentation and behavior  - Position to facilitate oxygenation and minimize respiratory effort  - Oxygen administered by appropriate delivery if ordered  - Initiate smoking cessation education as indicated  - Encourage broncho-pulmonary hygiene including cough, deep breathe, Incentive Spirometry  - Assess the need for suctioning and aspirate as needed  - Assess and instruct to report SOB or any respiratory difficulty  - Respiratory Therapy support as indicated  Outcome: Progressing     Problem: METABOLIC, FLUID AND ELECTROLYTES - ADULT  Goal: Electrolytes maintained within normal limits  Description: INTERVENTIONS:  - Monitor labs and assess patient for signs and symptoms of electrolyte imbalances  - Administer electrolyte replacement as ordered  - Monitor response to electrolyte replacements, including repeat lab results as appropriate  - Instruct patient on fluid and nutrition as appropriate  Outcome: Progressing  Goal: Fluid balance maintained  Description: INTERVENTIONS:  - Monitor labs   - Monitor I/O and WT  - Instruct patient on fluid and nutrition as appropriate  - Assess for signs & symptoms of volume excess or deficit  Outcome: Progressing

## 2022-08-10 NOTE — PLAN OF CARE
Problem: PAIN - ADULT  Goal: Verbalizes/displays adequate comfort level or baseline comfort level  Description: Interventions:  - Encourage patient to monitor pain and request assistance  - Assess pain using appropriate pain scale  - Administer analgesics based on type and severity of pain and evaluate response  - Implement non-pharmacological measures as appropriate and evaluate response  - Consider cultural and social influences on pain and pain management  - Notify physician/advanced practitioner if interventions unsuccessful or patient reports new pain  Outcome: Progressing     Problem: CARDIOVASCULAR - ADULT  Goal: Maintains optimal cardiac output and hemodynamic stability  Description: INTERVENTIONS:  - Monitor I/O, vital signs and rhythm  - Monitor for S/S and trends of decreased cardiac output  - Administer and titrate ordered vasoactive medications to optimize hemodynamic stability  - Assess quality of pulses, skin color and temperature  - Assess for signs of decreased coronary artery perfusion  - Instruct patient to report change in severity of symptoms  Outcome: Progressing     Problem: RESPIRATORY - ADULT  Goal: Achieves optimal ventilation and oxygenation  Description: INTERVENTIONS:  - Assess for changes in respiratory status  - Assess for changes in mentation and behavior  - Position to facilitate oxygenation and minimize respiratory effort  - Oxygen administered by appropriate delivery if ordered  - Initiate smoking cessation education as indicated  - Encourage broncho-pulmonary hygiene including cough, deep breathe, Incentive Spirometry  - Assess the need for suctioning and aspirate as needed  - Assess and instruct to report SOB or any respiratory difficulty  - Respiratory Therapy support as indicated  Outcome: Progressing     Problem: METABOLIC, FLUID AND ELECTROLYTES - ADULT  Goal: Electrolytes maintained within normal limits  Description: INTERVENTIONS:  - Monitor labs and assess patient for signs and symptoms of electrolyte imbalances  - Administer electrolyte replacement as ordered  - Monitor response to electrolyte replacements, including repeat lab results as appropriate  - Instruct patient on fluid and nutrition as appropriate  Outcome: Progressing

## 2022-08-10 NOTE — ASSESSMENT & PLAN NOTE
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

## 2022-08-10 NOTE — ASSESSMENT & PLAN NOTE
· Moderate to large right pleural effusion now status post right-sided thoracentesis about 1100 cc removed  · Most likely secondary to CHF, secondary to ischemic cardiomyopathy  · Follow cardiology recommendation  · Repeat chest x-ray post thoracentesis shows improvement of right-sided effusion  · Consult placed to pulmonology for hemoptysis  Patient noted to have some phlegm with small amounts of blood periodically throughout the day    Patient states that is been going on for the last few months

## 2022-08-10 NOTE — PROCEDURES
Thoracentesis (Bedside)    Date/Time: 8/10/2022 12:16 PM  Performed by: Saroj Villa MD  Authorized by: Saroj Villa MD     Patient location:  Bedside  Other Assisting Provider: Yes (comment) Anton Cuevas PA-C)    Consent:     Consent obtained:  Written    Consent given by:  Patient    Risks discussed:  Bleeding, incomplete drainage, nerve damage, infection, pain and pneumothorax    Alternatives discussed:  Delayed treatment and alternative treatment  Universal protocol:     Procedure explained and questions answered to patient or proxy's satisfaction: yes      Relevant documents present and verified: yes      Test results available and properly labeled: yes      Radiology Images displayed and confirmed  If images not available, report reviewed: yes      Required blood products, implants, devices and special equipment available: yes      Site/side marked: yes      Immediately prior to procedure a time out was called: yes      Patient identity confirmed:  Arm band and verbally with patient  Indications:     Procedure Purpose: diagnostic and therapeutic      Indications: pleural effusion    Anesthesia (see MAR for exact dosages): Anesthesia method:  Local infiltration    Local anesthetic: lidocaine 1% MPF 7 mL  Procedure details:     Preparation: Patient was prepped and draped in usual sterile fashion      Standard thoracentesis cath kit used: Yes      Patient position:  Sitting    Laterality:  Right    Location:  Midscapular line    Intercostal space:  8th    Puncture method:  Over-the-needle catheter    Ultrasound guidance: yes      Reason for ultrasound: Identify fluid collection and guide catheter placement  Ultrasound image availability:  Still images obtained, video obtained and images available in PACS    Sterile ultrasound techniques: Sterile gel and sterile probe covers were used      Indwelling catheter placed: no      Number of attempts:  1    Catheter size: 5 Fr fenestrated catheter  Drainage color:  Vickie    Drainage characteristics:  Clear    Fluid removed amount:  1100 mL  Post-procedure details:     Post-procedure chest x-ray: CXR ordered  Patient tolerance of procedure: Tolerated well, no immediate complications  Comments:      Lung sliding seen pre- and post-procedure  VSS throughout procedure

## 2022-08-10 NOTE — ASSESSMENT & PLAN NOTE
· Patient was started on Coumadin to prevent thrombus formation given apical akinesis-after recent cardiac catheterization at [facility]  · INR is 1 76   · Continue coumadin at home dosing; follow up outpatient

## 2022-08-10 NOTE — ASSESSMENT & PLAN NOTE
Wt Readings from Last 3 Encounters:   08/10/22 66 2 kg (145 lb 15 1 oz)   05/31/22 69 2 kg (152 lb 8 9 oz)   04/19/22 65 6 kg (144 lb 10 oz)     · Patient presented to the ED with complaints of dyspnea on exertion, claudication  · Etiology due to acute on chronic systolic heart failure in setting of reduced EF  · Cardiology consult, appreciate input  · Continue Torsemide twice daily  · Follow up outpatient  · Continue to monitor daily weights at home  · Patient complaining of worsening shortness of breath anxiety and hemoptysis which is ongoing for the last few weeks  Having anxiety attack and back on oxygen today  Ask Critical Care for evaluation and underwent right-sided thoracentesis 1100 cc removed of straw-colored fluid    Fluid sent for analysis

## 2022-08-10 NOTE — PROGRESS NOTES
[address]  Progress Note - Andrey Lam 1965, 56 y o  male MRN: [id number]  Unit/Bed#: [facility]-01 Encounter: [id number]  Primary Care Provider: Viktoriya Neal DO   Date and time admitted to hospital: 8/6/2022 12:31 PM    * Acute on chronic systolic CHF (congestive heart failure) ([address] )  Assessment & Plan  Wt Readings from Last 3 Encounters:   08/10/22 66 2 kg (145 lb 15 1 oz)   05/31/22 69 2 kg (152 lb 8 9 oz)   04/19/22 65 6 kg (144 lb 10 oz)     · Patient presented to the ED with complaints of dyspnea on exertion, claudication  · Etiology due to acute on chronic systolic heart failure in setting of reduced EF  · Cardiology consult, appreciate input  · Continue Torsemide twice daily  · Follow up outpatient  · Continue to monitor daily weights at home  · Patient complaining of worsening shortness of breath anxiety and hemoptysis which is ongoing for the last few weeks  Having anxiety attack and back on oxygen today  Ask Critical Care for evaluation and underwent right-sided thoracentesis 1100 cc removed of straw-colored fluid  Fluid sent for analysis    Pleural effusion, right  Assessment & Plan  · Moderate to large right pleural effusion now status post right-sided thoracentesis about 1100 cc removed  · Most likely secondary to CHF, secondary to ischemic cardiomyopathy  · Follow cardiology recommendation  · Repeat chest x-ray post thoracentesis shows improvement of right-sided effusion  · Consult placed to pulmonology for hemoptysis  Patient noted to have some phlegm with small amounts of blood periodically throughout the day  Patient states that is been going on for the last few months    Tobacco abuse  Assessment & Plan  · Nicoderm patch    Counseled on smoking cessation    Chronic anticoagulation  Assessment & Plan  · Patient was started on Coumadin to prevent thrombus formation given apical akinesis-after recent cardiac catheterization at [facility]  · INR is 1 76   · Continue coumadin at home dosing; follow up outpatient  History of COVID-19  Assessment & Plan  · Recent history of COVID on 05/31/2022    PAD (peripheral artery disease) ([facility])  Assessment & Plan  · Symptomatic with claudication-has mottling around the knee joint area on clinical exam  · Pedal pulse is faintly palpable with Doppler   · Status post aortobifemoral bypass surgery  · Patient is still active smoker  · Patient follows up with vascular surgery at [facility]  · Status post arterial duplex on 08/05/2022:  Patent right limp with abnormal flow demonstrated  50-69% stenosis of the right common femoral artery, occlusion of the right superficial femur with distal recanalization  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  · Case discuss by ER provider with on-call vascular surgeon had [facility], Dr Cristofer Mayer-continue conservative management pain control, no urgent inpatient procedure required      VTE Pharmacologic Prophylaxis:   Pharmacologic: Warfarin (Coumadin)  Mechanical VTE Prophylaxis in Place: Yes    Patient Centered Rounds: I have performed bedside rounds with nursing staff today  Discussions with Specialists or Other Care Team Provider:  Discussed with critical care    Education and Discussions with Family / Patient:  Discussed with patient bedside and will update sister    Time Spent for Care: 45 minutes  More than 50% of total time spent on counseling and coordination of care as described above      Current Length of Stay: 4 day(s)    Current Patient Status: Inpatient   Certification Statement: The patient will continue to require additional inpatient hospital stay due to Acute on chronic systolic CHF exacerbation    Discharge Plan:  Anticipate discharge in 24-48 hours pending progress    Code Status: Level 1 - Full Code      Subjective:   Patient denies any nausea vomiting or diarrhea however complains of chest tightness anxiety and shortness of breath also complaining of hemoptysis last few months which happened again today    Objective:     Vitals:   Temp (24hrs), Av 9 °F (36 6 °C), Min:97 4 °F (36 3 °C), Max:98 3 °F (36 8 °C)    Temp:  [97 4 °F (36 3 °C)-98 3 °F (36 8 °C)] 98 3 °F (36 8 °C)  HR:  [] 110  Resp:  [13-18] 17  BP: (100-102)/(74) 100/74  SpO2:  [85 %-97 %] 92 %  Body mass index is 23 56 kg/m²  Input and Output Summary (last 24 hours): Intake/Output Summary (Last 24 hours) at 8/10/2022 1339  Last data filed at 8/10/2022 1040  Gross per 24 hour   Intake 660 ml   Output 1800 ml   Net -1140 ml       Physical Exam:     Physical Exam  Vitals and nursing note reviewed  Constitutional:       General: He is in acute distress  Appearance: He is ill-appearing  HENT:      Head: Normocephalic and atraumatic  Right Ear: External ear normal       Left Ear: External ear normal       Nose: Nose normal       Mouth/Throat:      Pharynx: Oropharynx is clear  Cardiovascular:      Rate and Rhythm: Normal rate and regular rhythm  Heart sounds: Normal heart sounds  Pulmonary:      Effort: Respiratory distress present  Comments: Moderate air entry on left side but diminished breath sounds on the right mid to lower chest  Abdominal:      General: Bowel sounds are normal       Palpations: Abdomen is soft  Tenderness: There is no abdominal tenderness  Musculoskeletal:         General: Normal range of motion  Cervical back: Normal range of motion and neck supple  Skin:     General: Skin is warm and dry  Capillary Refill: Capillary refill takes less than 2 seconds  Neurological:      General: No focal deficit present  Mental Status: He is alert and oriented to person, place, and time     Psychiatric:         Mood and Affect: Mood normal            Additional Data:     Labs:    Results from last 7 days   Lab Units 22  0451   WBC Thousand/uL 5 90   HEMOGLOBIN g/dL 10 2*   HEMATOCRIT % 34 4*   PLATELETS Thousands/uL 293   NEUTROS PCT % 61   LYMPHS PCT % 29   MONOS PCT % 8   EOS PCT % 1     Results from last 7 days   Lab Units 08/10/22  0533 08/08/22  0517 08/07/22  0451   SODIUM mmol/L 135   < > 136   POTASSIUM mmol/L 3 5   < > 3 9   CHLORIDE mmol/L 97   < > 101   CO2 mmol/L 27   < > 22   BUN mg/dL 23   < > 18   CREATININE mg/dL 0 91   < > 0 87   ANION GAP mmol/L 11   < > 13   CALCIUM mg/dL 8 9   < > 8 5   ALBUMIN g/dL  --   --  2 8*   TOTAL BILIRUBIN mg/dL  --   --  0 97   ALK PHOS U/L  --   --  276*   ALT U/L  --   --  65   AST U/L  --   --  40   GLUCOSE RANDOM mg/dL 99   < > 93    < > = values in this interval not displayed  Results from last 7 days   Lab Units 08/10/22  1017   INR  1 76*             Results from last 7 days   Lab Units 08/06/22  1246   PROCALCITONIN ng/ml 0 13           * I Have Reviewed All Lab Data Listed Above  * Additional Pertinent Lab Tests Reviewed: Vinicius Mittal Admission Reviewed    Imaging:    Imaging Reports Reviewed Today Include:  Chest x-ray  Imaging Personally Reviewed by Myself Includes:  Chest x-ray    Recent Cultures (last 7 days):     Results from last 7 days   Lab Units 08/06/22  1631 08/06/22  1532   BLOOD CULTURE   --  No Growth at 72 hrs  No Growth at 72 hrs     LEGIONELLA URINARY ANTIGEN  Negative  --        Last 24 Hours Medication List:   Current Facility-Administered Medications   Medication Dose Route Frequency Provider Last Rate    acetaminophen  650 mg Oral Q4H PRN Marco Rose MD      atorvastatin  80 mg Oral Daily Marco Rose MD      clopidogrel  75 mg Oral Daily Sudhir Aguirre MD      docusate sodium  100 mg Oral BID Marco Rose MD      hydrOXYzine HCL  25 mg Oral TID Ny Vines MD      LORazepam  0 5 mg Oral Q6H PRN Ny Vines MD      melatonin  6 mg Oral HS BARBIE Turner      metoprolol succinate  25 mg Oral Daily Marco Rose MD      nicotine  1 patch Transdermal Daily MD Leida Bro oxyCODONE  5 mg Oral Q4H PRN Kai Han MD      polyethylene glycol  17 g Oral Daily Sudhir Aguirre MD      potassium chloride  20 mEq Oral BID BARBIE Sales      torsemide  20 mg Oral BID Steve Mccracken MD      warfarin  5 mg Oral Daily Steve Mccracken MD          Today, Patient Was Seen By: Steve Mccracken MD    ** Please Note: Dictation voice to text software may have been used in the creation of this document   **

## 2022-08-11 VITALS
BODY MASS INDEX: 23.24 KG/M2 | SYSTOLIC BLOOD PRESSURE: 111 MMHG | OXYGEN SATURATION: 98 % | WEIGHT: 144.6 LBS | RESPIRATION RATE: 14 BRPM | DIASTOLIC BLOOD PRESSURE: 82 MMHG | HEART RATE: 107 BPM | TEMPERATURE: 98.1 F | HEIGHT: 66 IN

## 2022-08-11 LAB
ANION GAP SERPL CALCULATED.3IONS-SCNC: 13 MMOL/L (ref 4–13)
BACTERIA BLD CULT: NORMAL
BACTERIA BLD CULT: NORMAL
BUN SERPL-MCNC: 28 MG/DL (ref 5–25)
CALCIUM SERPL-MCNC: 8.9 MG/DL (ref 8.3–10.1)
CHLORIDE SERPL-SCNC: 96 MMOL/L (ref 96–108)
CO2 SERPL-SCNC: 24 MMOL/L (ref 21–32)
CREAT SERPL-MCNC: 1.04 MG/DL (ref 0.6–1.3)
ERYTHROCYTE [DISTWIDTH] IN BLOOD BY AUTOMATED COUNT: 18.1 % (ref 11.6–15.1)
GFR SERPL CREATININE-BSD FRML MDRD: 80 ML/MIN/1.73SQ M
GLUCOSE SERPL-MCNC: 112 MG/DL (ref 65–140)
GRAM STN SPEC: NORMAL
GRAM STN SPEC: NORMAL
HCT VFR BLD AUTO: 35.6 % (ref 36.5–49.3)
HGB BLD-MCNC: 10.7 G/DL (ref 12–17)
INR PPP: 1.85 (ref 0.84–1.19)
MCH RBC QN AUTO: 23.9 PG (ref 26.8–34.3)
MCHC RBC AUTO-ENTMCNC: 30.1 G/DL (ref 31.4–37.4)
MCV RBC AUTO: 80 FL (ref 82–98)
PLATELET # BLD AUTO: 284 THOUSANDS/UL (ref 149–390)
PMV BLD AUTO: 9.7 FL (ref 8.9–12.7)
POTASSIUM SERPL-SCNC: 3.6 MMOL/L (ref 3.5–5.3)
PROTHROMBIN TIME: 21.5 SECONDS (ref 11.6–14.5)
RBC # BLD AUTO: 4.48 MILLION/UL (ref 3.88–5.62)
SODIUM SERPL-SCNC: 133 MMOL/L (ref 135–147)
WBC # BLD AUTO: 7.21 THOUSAND/UL (ref 4.31–10.16)

## 2022-08-11 PROCEDURE — 85610 PROTHROMBIN TIME: CPT | Performed by: FAMILY MEDICINE

## 2022-08-11 PROCEDURE — 85027 COMPLETE CBC AUTOMATED: CPT | Performed by: FAMILY MEDICINE

## 2022-08-11 PROCEDURE — 99255 IP/OBS CONSLTJ NEW/EST HI 80: CPT | Performed by: PHYSICIAN ASSISTANT

## 2022-08-11 PROCEDURE — 99239 HOSP IP/OBS DSCHRG MGMT >30: CPT | Performed by: FAMILY MEDICINE

## 2022-08-11 PROCEDURE — 80048 BASIC METABOLIC PNL TOTAL CA: CPT | Performed by: FAMILY MEDICINE

## 2022-08-11 RX ORDER — WARFARIN SODIUM 2.5 MG/1
2.5 TABLET ORAL DAILY
Start: 2022-08-11

## 2022-08-11 RX ORDER — TORSEMIDE 20 MG/1
TABLET ORAL
Qty: 90 TABLET | Refills: 0 | Status: SHIPPED | OUTPATIENT
Start: 2022-08-11 | End: 2022-09-10

## 2022-08-11 RX ORDER — BENZONATATE 100 MG/1
100 CAPSULE ORAL 3 TIMES DAILY PRN
Qty: 20 CAPSULE | Refills: 0 | Status: SHIPPED | OUTPATIENT
Start: 2022-08-11

## 2022-08-11 RX ORDER — OXYCODONE HYDROCHLORIDE AND ACETAMINOPHEN 5; 325 MG/1; MG/1
1 TABLET ORAL EVERY 6 HOURS PRN
Qty: 20 TABLET | Refills: 0 | Status: SHIPPED | OUTPATIENT
Start: 2022-08-11 | End: 2022-08-22

## 2022-08-11 RX ORDER — ACETAMINOPHEN 325 MG/1
650 TABLET ORAL EVERY 6 HOURS PRN
Refills: 0
Start: 2022-08-11

## 2022-08-11 RX ORDER — DOCUSATE SODIUM 100 MG/1
100 CAPSULE, LIQUID FILLED ORAL 2 TIMES DAILY
Qty: 60 CAPSULE | Refills: 0 | Status: SHIPPED | OUTPATIENT
Start: 2022-08-11 | End: 2022-09-10

## 2022-08-11 RX ORDER — NICOTINE 21 MG/24HR
1 PATCH, TRANSDERMAL 24 HOURS TRANSDERMAL DAILY
Qty: 28 PATCH | Refills: 0 | Status: SHIPPED | OUTPATIENT
Start: 2022-08-12 | End: 2022-08-22

## 2022-08-11 RX ORDER — POLYETHYLENE GLYCOL 3350 17 G/17G
17 POWDER, FOR SOLUTION ORAL DAILY
Qty: 510 G | Refills: 0 | Status: SHIPPED | OUTPATIENT
Start: 2022-08-12 | End: 2022-09-11

## 2022-08-11 RX ORDER — LANOLIN ALCOHOL/MO/W.PET/CERES
6 CREAM (GRAM) TOPICAL
Qty: 60 TABLET | Refills: 0 | Status: SHIPPED | OUTPATIENT
Start: 2022-08-11 | End: 2022-09-10

## 2022-08-11 RX ADMIN — LORAZEPAM 0.5 MG: 0.5 TABLET ORAL at 03:22

## 2022-08-11 RX ADMIN — GUAIFENESIN 200 MG: 200 SOLUTION ORAL at 03:22

## 2022-08-11 RX ADMIN — METOPROLOL SUCCINATE 25 MG: 25 TABLET, EXTENDED RELEASE ORAL at 08:17

## 2022-08-11 RX ADMIN — CLOPIDOGREL BISULFATE 75 MG: 75 TABLET ORAL at 08:17

## 2022-08-11 RX ADMIN — POTASSIUM CHLORIDE 20 MEQ: 1500 TABLET, EXTENDED RELEASE ORAL at 08:17

## 2022-08-11 RX ADMIN — TORSEMIDE 20 MG: 20 TABLET ORAL at 08:18

## 2022-08-11 RX ADMIN — ATORVASTATIN CALCIUM 80 MG: 40 TABLET, FILM COATED ORAL at 08:17

## 2022-08-11 NOTE — ASSESSMENT & PLAN NOTE
· Patient was started on Coumadin to prevent thrombus formation given apical akinesis-after recent cardiac catheterization at [facility]  · INR is 1 8  · Continue coumadin at home dosing; follow up outpatient

## 2022-08-11 NOTE — PLAN OF CARE
Problem: Potential for Falls  Goal: Patient will remain free of falls  Description: INTERVENTIONS:  - Educate patient/family on patient safety including physical limitations  - Instruct patient to call for assistance with activity   - Consult OT/PT to assist with strengthening/mobility   - Keep Call bell within reach  - Keep bed low and locked with side rails adjusted as appropriate  - Keep care items and personal belongings within reach  - Initiate and maintain comfort rounds  - Make Fall Risk Sign visible to staff  - Offer Toileting every 2 Hours, in advance of need  - Consider moving patient to room near nurses station  Outcome: Progressing

## 2022-08-11 NOTE — PLAN OF CARE
Problem: Potential for Falls  Goal: Patient will remain free of falls  Description: INTERVENTIONS:  - Educate patient/family on patient safety including physical limitations  - Instruct patient to call for assistance with activity   - Consult OT/PT to assist with strengthening/mobility   - Keep Call bell within reach  - Keep bed low and locked with side rails adjusted as appropriate  - Keep care items and personal belongings within reach  - Initiate and maintain comfort rounds  - Make Fall Risk Sign visible to staff  - Offer Toileting every 2 Hours, in advance of need  - Consider moving patient to room near nurses station  8/11/2022 1153 by Kb Ferguson RN  Outcome: Completed  8/11/2022 0956 by Kb Ferguson RN  Outcome: Progressing     Problem: PAIN - ADULT  Goal: Verbalizes/displays adequate comfort level or baseline comfort level  Description: Interventions:  - Encourage patient to monitor pain and request assistance  - Assess pain using appropriate pain scale  - Administer analgesics based on type and severity of pain and evaluate response  - Implement non-pharmacological measures as appropriate and evaluate response  - Consider cultural and social influences on pain and pain management  - Notify physician/advanced practitioner if interventions unsuccessful or patient reports new pain  Outcome: Completed     Problem: INFECTION - ADULT  Goal: Absence or prevention of progression during hospitalization  Description: INTERVENTIONS:  - Assess and monitor for signs and symptoms of infection  - Monitor lab/diagnostic results  - Monitor all insertion sites, i e  indwelling lines, tubes, and drains  - Monitor endotracheal if appropriate and nasal secretions for changes in amount and color  - [place] appropriate cooling/warming therapies per order  - Administer medications as ordered  - Instruct and encourage patient and family to use good hand hygiene technique  - Identify and instruct in appropriate isolation precautions for identified infection/condition  Outcome: Completed     Problem: DISCHARGE PLANNING  Goal: Discharge to home or other facility with appropriate resources  Description: INTERVENTIONS:  - Identify barriers to discharge w/patient and caregiver  - Arrange for needed discharge resources and transportation as appropriate  - Identify discharge learning needs (meds, wound care, etc )  - Arrange for interpretive services to assist at discharge as needed  - Refer to Case Management Department for coordinating discharge planning if the patient needs post-hospital services based on physician/advanced practitioner order or complex needs related to functional status, cognitive ability, or social support system  Outcome: Completed     Problem: Knowledge Deficit  Goal: Patient/family/caregiver demonstrates understanding of disease process, treatment plan, medications, and discharge instructions  Description: Complete learning assessment and assess knowledge base    Interventions:  - Provide teaching at level of understanding  - Provide teaching via preferred learning methods  Outcome: Completed     Problem: CARDIOVASCULAR - ADULT  Goal: Maintains optimal cardiac output and hemodynamic stability  Description: INTERVENTIONS:  - Monitor I/O, vital signs and rhythm  - Monitor for S/S and trends of decreased cardiac output  - Administer and titrate ordered vasoactive medications to optimize hemodynamic stability  - Assess quality of pulses, skin color and temperature  - Assess for signs of decreased coronary artery perfusion  - Instruct patient to report change in severity of symptoms  Outcome: Completed     Problem: RESPIRATORY - ADULT  Goal: Achieves optimal ventilation and oxygenation  Description: INTERVENTIONS:  - Assess for changes in respiratory status  - Assess for changes in mentation and behavior  - Position to facilitate oxygenation and minimize respiratory effort  - Oxygen administered by appropriate delivery if ordered  - Initiate smoking cessation education as indicated  - Encourage broncho-pulmonary hygiene including cough, deep breathe, Incentive Spirometry  - Assess the need for suctioning and aspirate as needed  - Assess and instruct to report SOB or any respiratory difficulty  - Respiratory Therapy support as indicated  Outcome: Completed     Problem: METABOLIC, FLUID AND ELECTROLYTES - ADULT  Goal: Electrolytes maintained within normal limits  Description: INTERVENTIONS:  - Monitor labs and assess patient for signs and symptoms of electrolyte imbalances  - Administer electrolyte replacement as ordered  - Monitor response to electrolyte replacements, including repeat lab results as appropriate  - Instruct patient on fluid and nutrition as appropriate  Outcome: Completed  Goal: Fluid balance maintained  Description: INTERVENTIONS:  - Monitor labs   - Monitor I/O and WT  - Instruct patient on fluid and nutrition as appropriate  - Assess for signs & symptoms of volume excess or deficit  Outcome: Completed

## 2022-08-11 NOTE — NURSING NOTE
Discharge instructions and medications reviewed with patient and pt's mother  All questions answered  IV d/c'd  Scripts for bloodwork and referral to vascular surgery given to pt

## 2022-08-11 NOTE — CONSULTS
Consultation - Pulmonary Medicine   Edel Romero 56 y o  male MRN: [id number]  Unit/Bed#: -01 Encounter: [id number]      Assessment/Plan:    1  Acute respiratory insufficiency  1  Initially required 2 L nasal cannula then weaned to room air  2  Seen on room air without desaturations during my visit  3  Keep oxygen saturations above 88%  4  Pulmonary toilet:  Increase activity as tolerated, out of bed as tolerated, cough and deep breathing as encouraged, incentive spirometry q 1 hour  2  Hemoptysis  1  No witnessed hemoptysis during my evaluation  2  Reported that this is been ongoing for months if not years and characterized as quarter-size clots mixed with sputum  3  Would recommend outpatient bronchoscopy  3  Right pleural effusion  1  Status post thoracentesis for 1100 cc transudative fluid yesterday  2  Likely in the setting of CHF exacerbation  3  Continue diuretics per primary team  4  History of recent COVID  1  Given ongoing dyspnea would recommend follow-up with Pulmonary outpatient  2  No need for COVID directed therapies while inpatient  3  Repeat CT chest in 4-6 weeks for follow-up after pleural effusion and to re-evaluate ground-glass and nodular opacities  5  Acute on chronic systolic CHF  1  Treatment per primary team  6  Nicotine dependence  1  Smoking cessation encouraged  2  Continue nicotine replacement therapy while inpatient  7  Anxiety  1  Treatment per primary team    History of Present Illness   Physician Requesting Consult: No att  providers found  Reason for Consult / Principal Problem:  Hemoptysis  Hx and PE limited by: n/a  Chief Complaint: shortness of breath  HPI: Edel Romero is a 56 y o   male who presented to [address] [place] [address] with complaints of shortness of breath and leg pain  Patient presented 08/06/2022 was found to have acute on chronic CHF exacerbation along with right-sided pleural effusion and chronic claudication due to PAD    Patient diuresed well through hospitalization lost 22 lb  He is still having ongoing shortness of breath so he had right-sided thoracentesis yesterday which yielded a 1100 cc  Pulmonary was consulted today after he reported that he had been coughing up blood for quite some time stating that is been going on for months to years  He characterizes it as bright red blood quarter size mixed with sputum that happens periodically throughout the day  Patient does smoke about a pack a day for the last 40 years  Patient is retired but previously worked as an   Denies exposures to asbestos, silica, coal, dust, chemicals  Does not follow a pulmonologist   He does not use inhalers or nebulizers at baseline  No supplemental oxygen at baseline either  At the time of my evaluation patient reported that he was feeling he was at his baseline and was requesting to leave  Inpatient consult to Pulmonology  Consult performed by: Telly Narayanan PA-C  Consult ordered by: Duran Lantigua MD          Review of Systems   All other systems reviewed and are negative  Historical Information   Past Medical History:   Diagnosis Date    [facility]est[facility]) [facility] )     Coronary artery disease     MI, old      Past Surgical History:   Procedure Laterality Date    ABDOMINAL SURGERY      CORONARY ANGIOPLASTY WITH STENT PLACEMENT  04/10/2022    x 2     Social History   Social History     Substance and Sexual Activity   Alcohol Use Yes    Comment: rarely     Social History     Substance and Sexual Activity   Drug Use Not Currently     Social History     Tobacco Use   Smoking Status Current Every Day Smoker    Packs/day: 0 25    Types: Cigarettes   Smokeless Tobacco Never Used     E-Cigarette/Vaping    E-Cigarette Use Never User      E-Cigarette/Vaping Substances     Occupational History: retired  See HPI    Family History: History reviewed  No pertinent family history      Meds/Allergies   all current active meds have been reviewed, pertinent pulmonary meds have been reviewed and current meds:   Current Facility-Administered Medications   Medication Dose Route Frequency    acetaminophen (TYLENOL) tablet 650 mg  650 mg Oral Q4H PRN    albuterol (PROVENTIL HFA,VENTOLIN HFA) inhaler 2 puff  2 puff Inhalation Q4H PRN    atorvastatin (LIPITOR) tablet 80 mg  80 mg Oral Daily    clopidogrel (PLAVIX) tablet 75 mg  75 mg Oral Daily    docusate sodium (COLACE) capsule 100 mg  100 mg Oral BID    guaiFENesin (ROBITUSSIN) oral solution 200 mg  200 mg Oral Q4H PRN    hydrOXYzine HCL (ATARAX) tablet 25 mg  25 mg Oral TID    LORazepam (ATIVAN) tablet 0 5 mg  0 5 mg Oral Q6H PRN    melatonin tablet 6 mg  6 mg Oral HS    metoprolol succinate (TOPROL-XL) 24 hr tablet 25 mg  25 mg Oral Daily    nicotine (NICODERM CQ) 14 mg/24hr TD 24 hr patch 1 patch  1 patch Transdermal Daily    oxyCODONE (ROXICODONE) IR tablet 5 mg  5 mg Oral Q4H PRN    polyethylene glycol (MIRALAX) packet 17 g  17 g Oral Daily    potassium chloride (K-DUR,KLOR-CON) CR tablet 20 mEq  20 mEq Oral BID    torsemide (DEMADEX) tablet 20 mg  20 mg Oral BID    warfarin (COUMADIN) tablet 5 mg  5 mg Oral Daily       No Known Allergies    Objective   Vitals: Blood pressure 111/82, pulse (!) 107, temperature 98 1 °F (36 7 °C), resp  rate 14, height 5' 6" (1 676 m), weight 65 6 kg (144 lb 9 6 oz), SpO2 98 %  room air,Body mass index is 23 34 kg/m²  Intake/Output Summary (Last 24 hours) at 8/11/2022 1421  Last data filed at 8/11/2022 1154  Gross per 24 hour   Intake 1620 ml   Output 375 ml   Net 1245 ml     Invasive Devices  Report    None                 Physical Exam  Vitals and nursing note reviewed  Constitutional:       General: He is not in acute distress  Appearance: Normal appearance  HENT:      Head: Normocephalic and atraumatic  Nose: Nose normal       Mouth/Throat:      Mouth: Mucous membranes are moist       Pharynx: Oropharynx is clear     Eyes:      Extraocular Movements: Extraocular movements intact  Conjunctiva/sclera: Conjunctivae normal    Cardiovascular:      Rate and Rhythm: Normal rate and regular rhythm  Pulses: Normal pulses  Heart sounds: No murmur heard  Pulmonary:      Effort: Pulmonary effort is normal       Breath sounds: No wheezing or rhonchi  Abdominal:      General: Bowel sounds are normal       Palpations: Abdomen is soft  Tenderness: There is no abdominal tenderness  Musculoskeletal:         General: Normal range of motion  Cervical back: Normal range of motion and neck supple  No muscular tenderness  Right lower leg: No edema  Left lower leg: No edema  Skin:     General: Skin is warm and dry  Neurological:      General: No focal deficit present  Mental Status: He is alert  Mental status is at baseline  Psychiatric:         Mood and Affect: Mood normal          Behavior: Behavior normal          Lab Results:   I have personally reviewed pertinent lab results  , ABG: No results found for: PHART, BBL1DNK, PO2ART, DKA5OXJ, P1GITRMR, BEART, SOURCE, BNP: No results found for: BNP, CBC:   Lab Results   Component Value Date    WBC 7 21 08/11/2022    HGB 10 7 (L) 08/11/2022    HCT 35 6 (L) 08/11/2022    MCV 80 (L) 08/11/2022     08/11/2022    MCH 23 9 (L) 08/11/2022    MCHC 30 1 (L) 08/11/2022    RDW 18 1 (H) 08/11/2022    MPV 9 7 08/11/2022   , CMP:   Lab Results   Component Value Date    SODIUM 133 (L) 08/11/2022    K 3 6 08/11/2022    CL 96 08/11/2022    CO2 24 08/11/2022    BUN 28 (H) 08/11/2022    CREATININE 1 04 08/11/2022    CALCIUM 8 9 08/11/2022    EGFR 80 08/11/2022   , PT/INR:   Lab Results   Component Value Date    INR 1 85 (H) 08/11/2022   , Troponin: No results found for: TROPONINI    Imaging Studies: I have personally reviewed pertinent reports     and I have personally reviewed pertinent films in PACS     Chest x-ray 08/10/2022  Diminished right sided pleural effusion status post thoracentesis  No pneumothorax  Right lower lobe infiltrate likely consistent with atelectasis    CTA chest PE study 08/06/2022  No definite PE  Moderate to large right-sided pleural effusion  Minimal ground-glass opacities with mild interlobular septal thickening likely related to pulmonary vascular congestion  Mixed attenuation focal opacities in the left mid lung with evolving nodular consolidation/density  EKG, Pathology, and Other Studies: I have personally reviewed pertinent reports  Pulmonary Results (PFTs, PSG): none to review      VTE Prophylaxis: Enoxaparin (Lovenox)    Code Status: Level 1 - Full Code    Portions of the record may have been created with voice recognition software  Occasional wrong word or "sound a like" substitutions may have occurred due to the inherent limitations of voice recognition software  Read the chart carefully and recognize, using context, where substitutions have occurred

## 2022-08-11 NOTE — ASSESSMENT & PLAN NOTE
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

## 2022-08-11 NOTE — ASSESSMENT & PLAN NOTE
Wt Readings from Last 3 Encounters:   08/11/22 65 6 kg (144 lb 9 6 oz)   05/31/22 69 2 kg (152 lb 8 9 oz)   04/19/22 65 6 kg (144 lb 10 oz)     · Patient presented to the ED with complaints of dyspnea on exertion, claudication  · Cardiology consult, appreciate input  · Continue Torsemide twice daily  · Follow up outpatient  · Continue to monitor daily weights at home  · 8/10:Patient complaining of worsening shortness of breath anxiety and hemoptysis which is ongoing for the last few weeks  Having anxiety attack and back on oxygen   seen by Critical Care for evaluation and underwent right-sided thoracentesis 1100 cc removed of straw-colored fluid    Fluid sent for analysis appears to be transudative  · 8/11:  Discharge home today with torsemide regimen with outpatient follow-up with Cardiology and pulmonology

## 2022-08-11 NOTE — ASSESSMENT & PLAN NOTE
· Moderate to large right pleural effusion now status post right-sided thoracentesis about 1100 cc removed  · Most likely secondary to CHF, secondary to ischemic cardiomyopathy  · Follow cardiology recommendation  · Repeat chest x-ray post thoracentesis shows improvement of right-sided effusion  · Consult placed to pulmonology for hemoptysis  Patient noted to have some phlegm with small amounts of blood periodically throughout the day  Patient states that is been going on for the last few months    Discussed with pulmonology and recommend outpatient follow-up with possible bronch in the future

## 2022-08-12 NOTE — UTILIZATION REVIEW
Notification of Discharge   This is a Notification of Discharge from our facility [address]  Please be advised that this patient has been discharge from our facility  Below you will find the admission and discharge date and time including the patients disposition  UTILIZATION REVIEW CONTACT:  [address] [place]  Utilization   Network Utilization Review Department  Phone: [phone] x carefully listen to the prompts  All voicemails are confidential   Email: [email or web address]     PHYSICIAN ADVISORY SERVICES:  FOR [id number] REVIEW - MEDICAL NECESSITY DENIAL  Phone: [phone]  Fax: [phone]  Email: [email or web address]     PRESENTATION DATE: 8/6/2022 12:31 PM  OBERVATION ADMISSION DATE:  INPATIENT ADMISSION DATE: 8/6/22  3:37 PM   DISCHARGE DATE: 8/11/2022 12:39 PM  DISPOSITION: Home/Self Care Home/Self Care      IMPORTANT INFORMATION:  Send all requests for admission clinical reviews, approved or denied determinations and any other requests to dedicated fax number below belonging to the campus where the patient is receiving treatment   List of dedicated fax numbers:  [address] DENIALS (Administrative/Medical Necessity) [phone]   [address] (Maternity/NICU/Pediatrics) [phone]   [facility] [phone]   130 [facility] [phone]   [address] [place] [address] [phone]   2000 [facility] [address] [place] [address],[address] [phone]   [facility]  [phone]   [address] [place] [address], [facility]  2401 [facility] 1000 [address] [facility] [phone]

## 2022-08-22 ENCOUNTER — APPOINTMENT (OUTPATIENT)
Dept: RADIOLOGY | Facility: HOSPITAL | Age: 57
End: 2022-08-22
Payer: COMMERCIAL

## 2022-08-22 ENCOUNTER — HOSPITAL ENCOUNTER (EMERGENCY)
Facility: HOSPITAL | Age: 57
Discharge: HOME | End: 2022-08-22
Attending: EMERGENCY MEDICINE | Admitting: EMERGENCY MEDICINE
Payer: COMMERCIAL

## 2022-08-22 VITALS
HEART RATE: 92 BPM | WEIGHT: 155.65 LBS | TEMPERATURE: 97 F | BODY MASS INDEX: 25.93 KG/M2 | DIASTOLIC BLOOD PRESSURE: 75 MMHG | RESPIRATION RATE: 25 BRPM | OXYGEN SATURATION: 96 % | SYSTOLIC BLOOD PRESSURE: 105 MMHG | HEIGHT: 65 IN

## 2022-08-22 DIAGNOSIS — R06.00 DYSPNEA, UNSPECIFIED TYPE: Primary | ICD-10-CM

## 2022-08-22 DIAGNOSIS — I50.9 CHRONIC CHF (CONGESTIVE HEART FAILURE) (HCC): ICD-10-CM

## 2022-08-22 DIAGNOSIS — F41.9 ANXIETY: ICD-10-CM

## 2022-08-22 LAB
2HR DELTA HS TROPONIN: -4 NG/L
ALBUMIN SERPL BCP-MCNC: 3.6 G/DL (ref 3.5–5)
ALP SERPL-CCNC: 279 U/L (ref 46–116)
ALT SERPL W P-5'-P-CCNC: 52 U/L (ref 12–78)
ANION GAP SERPL CALCULATED.3IONS-SCNC: 14 MMOL/L (ref 4–13)
APTT PPP: 32 SECONDS (ref 23–37)
AST SERPL W P-5'-P-CCNC: 25 U/L (ref 5–45)
ATRIAL RATE: 101 BPM
BASOPHILS # BLD AUTO: 0.06 THOUSANDS/ΜL (ref 0–0.1)
BASOPHILS NFR BLD AUTO: 1 % (ref 0–1)
BILIRUB SERPL-MCNC: 1.05 MG/DL (ref 0.2–1)
BUN SERPL-MCNC: 24 MG/DL (ref 5–25)
CALCIUM SERPL-MCNC: 9.8 MG/DL (ref 8.3–10.1)
CARDIAC TROPONIN I PNL SERPL HS: 18 NG/L
CARDIAC TROPONIN I PNL SERPL HS: 22 NG/L
CHLORIDE SERPL-SCNC: 96 MMOL/L (ref 96–108)
CO2 SERPL-SCNC: 28 MMOL/L (ref 21–32)
CREAT SERPL-MCNC: 1.16 MG/DL (ref 0.6–1.3)
EOSINOPHIL # BLD AUTO: 0.1 THOUSAND/ΜL (ref 0–0.61)
EOSINOPHIL NFR BLD AUTO: 1 % (ref 0–6)
ERYTHROCYTE [DISTWIDTH] IN BLOOD BY AUTOMATED COUNT: 19.1 % (ref 11.6–15.1)
GFR SERPL CREATININE-BSD FRML MDRD: 70 ML/MIN/1.73SQ M
GLUCOSE SERPL-MCNC: 113 MG/DL (ref 65–140)
HCT VFR BLD AUTO: 41 % (ref 36.5–49.3)
HGB BLD-MCNC: 11.8 G/DL (ref 12–17)
IMM GRANULOCYTES # BLD AUTO: 0.04 THOUSAND/UL (ref 0–0.2)
IMM GRANULOCYTES NFR BLD AUTO: 1 % (ref 0–2)
INR PPP: 2.36 (ref 0.84–1.19)
LYMPHOCYTES # BLD AUTO: 2.6 THOUSANDS/ΜL (ref 0.6–4.47)
LYMPHOCYTES NFR BLD AUTO: 34 % (ref 14–44)
MCH RBC QN AUTO: 23.1 PG (ref 26.8–34.3)
MCHC RBC AUTO-ENTMCNC: 28.8 G/DL (ref 31.4–37.4)
MCV RBC AUTO: 80 FL (ref 82–98)
MONOCYTES # BLD AUTO: 0.44 THOUSAND/ΜL (ref 0.17–1.22)
MONOCYTES NFR BLD AUTO: 6 % (ref 4–12)
NEUTROPHILS # BLD AUTO: 4.33 THOUSANDS/ΜL (ref 1.85–7.62)
NEUTS SEG NFR BLD AUTO: 57 % (ref 43–75)
NRBC BLD AUTO-RTO: 1 /100 WBCS
NT-PROBNP SERPL-MCNC: 5303 PG/ML
P AXIS: 64 DEGREES
PLATELET # BLD AUTO: 387 THOUSANDS/UL (ref 149–390)
PMV BLD AUTO: 9.5 FL (ref 8.9–12.7)
POTASSIUM SERPL-SCNC: 3.5 MMOL/L (ref 3.5–5.3)
PR INTERVAL: 162 MS
PROT SERPL-MCNC: 7.9 G/DL (ref 6.4–8.4)
PROTHROMBIN TIME: 25.8 SECONDS (ref 11.6–14.5)
QRS AXIS: 98 DEGREES
QRSD INTERVAL: 108 MS
QT INTERVAL: 380 MS
QTC INTERVAL: 492 MS
RBC # BLD AUTO: 5.1 MILLION/UL (ref 3.88–5.62)
SODIUM SERPL-SCNC: 138 MMOL/L (ref 135–147)
T WAVE AXIS: 18 DEGREES
VENTRICULAR RATE: 101 BPM
WBC # BLD AUTO: 7.57 THOUSAND/UL (ref 4.31–10.16)

## 2022-08-22 PROCEDURE — 99284 EMERGENCY DEPT VISIT MOD MDM: CPT | Performed by: EMERGENCY MEDICINE

## 2022-08-22 PROCEDURE — 99285 EMERGENCY DEPT VISIT HI MDM: CPT

## 2022-08-22 PROCEDURE — 84484 ASSAY OF TROPONIN QUANT: CPT | Performed by: EMERGENCY MEDICINE

## 2022-08-22 PROCEDURE — 71045 X-RAY EXAM CHEST 1 VIEW: CPT

## 2022-08-22 PROCEDURE — 85610 PROTHROMBIN TIME: CPT | Performed by: EMERGENCY MEDICINE

## 2022-08-22 PROCEDURE — 85730 THROMBOPLASTIN TIME PARTIAL: CPT | Performed by: EMERGENCY MEDICINE

## 2022-08-22 PROCEDURE — 96374 THER/PROPH/DIAG INJ IV PUSH: CPT

## 2022-08-22 PROCEDURE — 83880 ASSAY OF NATRIURETIC PEPTIDE: CPT | Performed by: EMERGENCY MEDICINE

## 2022-08-22 PROCEDURE — 80053 COMPREHEN METABOLIC PANEL: CPT | Performed by: EMERGENCY MEDICINE

## 2022-08-22 PROCEDURE — 85025 COMPLETE CBC W/AUTO DIFF WBC: CPT | Performed by: EMERGENCY MEDICINE

## 2022-08-22 PROCEDURE — 93005 ELECTROCARDIOGRAM TRACING: CPT

## 2022-08-22 PROCEDURE — 36415 COLL VENOUS BLD VENIPUNCTURE: CPT | Performed by: EMERGENCY MEDICINE

## 2022-08-22 RX ORDER — OXYCODONE HYDROCHLORIDE AND ACETAMINOPHEN 5; 325 MG/1; MG/1
1 TABLET ORAL ONCE
Status: COMPLETED | OUTPATIENT
Start: 2022-08-22 | End: 2022-08-22

## 2022-08-22 RX ORDER — FUROSEMIDE 10 MG/ML
80 INJECTION INTRAMUSCULAR; INTRAVENOUS ONCE
Status: COMPLETED | OUTPATIENT
Start: 2022-08-22 | End: 2022-08-22

## 2022-08-22 RX ADMIN — OXYCODONE HYDROCHLORIDE AND ACETAMINOPHEN 1 TABLET: 5; 325 TABLET ORAL at 05:48

## 2022-08-22 RX ADMIN — FUROSEMIDE 80 MG: 10 INJECTION, SOLUTION INTRAMUSCULAR; INTRAVENOUS at 03:50

## 2022-08-22 RX ADMIN — NITROGLYCERIN 0.5 INCH: 20 OINTMENT TOPICAL at 03:53

## 2022-08-22 NOTE — ED PROVIDER NOTES
History  Chief Complaint   Patient presents with    Shortness of Breath     Patient reports SOB beginning around 2100, lower leg swelling  Recent hospitalization for same  History provided by:  Medical records and patient  Shortness of Breath  Severity:  Moderate  Onset quality:  Gradual  Duration:  6 hours  Timing:  Constant  Progression:  Unchanged  Chronicity:  Recurrent  Context comment:  History of CHF, anxiety, states at 9:00 a m  Last night he developed sudden onset of shortness of breath  Recent hospitalization for CHF and pleural effusion which required thoracentesis  Relates some weight gain and increase in diuretics  Relieved by:  Nothing  Worsened by:  Exertion  Ineffective treatments:  Diuretics, position changes and sitting up  Associated symptoms: claudication    Associated symptoms: no abdominal pain, no chest pain, no cough, no ear pain, no fever, no headaches, no rash, no sore throat and no vomiting    Associated symptoms comment:  History of peripheral vascular disease, on Coumadin, has vascular surgery appointment today at 9:30 a m  [place]      Prior to Admission Medications   Prescriptions Last Dose Informant Patient Reported?  Taking?   acetaminophen (TYLENOL) 325 mg tablet   No Yes   Sig: Take 2 tablets (650 mg total) by mouth every 6 (six) hours as needed for mild pain, headaches or fever   atorvastatin (LIPITOR) 80 mg tablet   Yes Yes   Sig: Take 80 mg by mouth daily   benzonatate (TESSALON PERLES) 100 mg capsule   No Yes   Sig: Take 1 capsule (100 mg total) by mouth 3 (three) times a day as needed for cough   clopidogrel (PLAVIX) 75 mg tablet   Yes Yes   Sig: Take 75 mg by mouth daily   docusate sodium (COLACE) 100 mg capsule   No Yes   Sig: Take 1 capsule (100 mg total) by mouth 2 (two) times a day   gabapentin (NEURONTIN) 600 MG tablet  Self Yes Yes   Sig: Take 600 mg by mouth daily as needed   melatonin 3 mg   No Yes   Sig: Take 2 tablets (6 mg total) by mouth daily at bedtime   metoprolol succinate (TOPROL-XL) 25 mg 24 hr tablet   Yes Yes   Sig: Take 25 mg by mouth daily   oxyCODONE-acetaminophen (Percocet) 5-325 mg per tablet   No Yes   Sig: Take 1 tablet by mouth every 6 (six) hours as needed for moderate pain for up to 10 days Max Daily Amount: 4 tablets   polyethylene glycol (MIRALAX) 17 g packet   No Yes   Sig: Take 17 g by mouth daily   potassium chloride (K-DUR,KLOR-CON) 20 mEq tablet   No Yes   Sig: Take 1 tablet (20 mEq total) by mouth 2 (two) times a day   torsemide (DEMADEX) 20 mg tablet   No Yes   Sig: Take 2 tablets (40 mg total) by mouth daily after breakfast AND 1 tablet (20 mg total) daily after lunch    warfarin (COUMADIN) 2 5 mg tablet   No Yes   Sig: Take 1 tablet (2 5 mg total) by mouth daily      Facility-Administered Medications: None       Past Medical History:   Diagnosis Date    CHF (congestive heart failure) (HCC)     Coronary artery disease     COVID     MI, old        Past Surgical History:   Procedure Laterality Date    ABDOMINAL SURGERY      CORONARY ANGIOPLASTY WITH STENT PLACEMENT  04/10/2022    x 2       History reviewed  No pertinent family history  I have reviewed and agree with the history as documented  E-Cigarette/Vaping    E-Cigarette Use Never User      E-Cigarette/Vaping Substances     Social History     Tobacco Use    Smoking status: Current Every Day Smoker     Packs/day: 0 25     Types: Cigarettes    Smokeless tobacco: Never Used   Vaping Use    Vaping Use: Never used   Substance Use Topics    Alcohol use: Yes    Drug use: Not Currently       Review of Systems   Constitutional: Negative for appetite change, chills, fatigue and fever  HENT: Negative for ear pain, rhinorrhea, sore throat and trouble swallowing  Eyes: Negative for pain, discharge and visual disturbance  Respiratory: Positive for shortness of breath  Negative for cough and chest tightness  Cardiovascular: Positive for claudication and leg swelling  Negative for chest pain and palpitations  Gastrointestinal: Negative for abdominal pain, nausea and vomiting  Endocrine: Negative for polydipsia, polyphagia and polyuria  Genitourinary: Negative for difficulty urinating, dysuria, hematuria and testicular pain  Musculoskeletal: Negative for arthralgias and back pain  Skin: Negative for color change and rash  Allergic/Immunologic: Negative for immunocompromised state  Neurological: Negative for dizziness, seizures, syncope, weakness and headaches  Hematological: Negative for adenopathy  Psychiatric/Behavioral: Negative for confusion and dysphoric mood  The patient is nervous/anxious  All other systems reviewed and are negative  Physical Exam  Physical Exam  Constitutional:       General: He is not in acute distress  Appearance: Normal appearance  He is not ill-appearing, toxic-appearing or diaphoretic  Comments: Anxious appearing   HENT:      Head: Normocephalic and atraumatic  Nose: Nose normal  No congestion or rhinorrhea  Mouth/Throat:      Mouth: Mucous membranes are moist       Pharynx: Oropharynx is clear  No oropharyngeal exudate or posterior oropharyngeal erythema  Eyes:      General:         Right eye: No discharge  Left eye: No discharge  Neck:      Vascular: Hepatojugular reflux present  Cardiovascular:      Rate and Rhythm: Normal rate and regular rhythm  Pulses: Normal pulses  Heart sounds: Normal heart sounds  No murmur heard  No gallop  Pulmonary:      Effort: Pulmonary effort is normal  Tachypnea present  No respiratory distress  Breath sounds: Normal breath sounds  No stridor  No wheezing, rhonchi or rales  Chest:      Chest wall: Edema present  No tenderness  Comments: +2 bilateral lower leg swelling  Abdominal:      General: Bowel sounds are normal  There is no distension  Palpations: Abdomen is soft  There is no mass  Tenderness:  There is no abdominal tenderness  There is no right CVA tenderness, left CVA tenderness, guarding or rebound  Hernia: No hernia is present  Musculoskeletal:         General: Normal range of motion  Cervical back: Normal range of motion and neck supple  Right lower leg: Edema present  Left lower leg: Edema present  Skin:     General: Skin is warm and dry  Capillary Refill: Capillary refill takes less than 2 seconds  Neurological:      General: No focal deficit present  Mental Status: He is alert and oriented to person, place, and time  Cranial Nerves: No cranial nerve deficit  Sensory: No sensory deficit  Motor: No weakness  Coordination: Coordination normal       Gait: Gait normal       Deep Tendon Reflexes: Reflexes normal    Psychiatric:         Mood and Affect: Mood normal          Behavior: Behavior normal          Thought Content:  Thought content normal          Judgment: Judgment normal          Vital Signs  ED Triage Vitals [08/22/22 0333]   Temperature Pulse Respirations Blood Pressure SpO2   (!) 97 °F (36 1 °C) 103 (!) 24 122/68 100 %      Temp Source Heart Rate Source Patient Position - Orthostatic VS BP Location FiO2 (%)   Temporal Monitor Lying Left arm --      Pain Score       6           Vitals:    08/22/22 0400 08/22/22 0440 08/22/22 0500 08/22/22 0630   BP: 97/67 120/53 108/83 105/75   Pulse: 97 98 97 92   Patient Position - Orthostatic VS: Sitting Sitting Sitting Sitting         Visual Acuity      ED Medications  Medications   nitroglycerin (NITRO-BID) 2 % TD ointment 0 5 inch (0 5 inches Topical Given 8/22/22 0353)   furosemide (LASIX) injection 80 mg (80 mg Intravenous Given 8/22/22 0350)   oxyCODONE-acetaminophen (PERCOCET) 5-325 mg per tablet 1 tablet (1 tablet Oral Given 8/22/22 0548)       Diagnostic Studies  Results Reviewed     Procedure Component Value Units Date/Time    HS Troponin I 2hr [[id number]]  (Normal) Collected: 08/22/22 0551    Lab Status: Final result Specimen: Blood from Line, Venous Updated: 08/22/22 0625     hs TnI 2hr 18 ng/L      Delta 2hr hsTnI -4 ng/L     NT-BNP PRO [[id number]]  (Abnormal) Collected: 08/22/22 0348    Lab Status: Final result Specimen: Blood from Arm, Right Updated: 08/22/22 0439     NT-proBNP 5,303 pg/mL     Comprehensive metabolic panel [[id number]]  (Abnormal) Collected: 08/22/22 0348    Lab Status: Final result Specimen: Blood from Arm, Right Updated: 08/22/22 0436     Sodium 138 mmol/L      Potassium 3 5 mmol/L      Chloride 96 mmol/L      CO2 28 mmol/L      ANION GAP 14 mmol/L      BUN 24 mg/dL      Creatinine 1 16 mg/dL      Glucose 113 mg/dL      Calcium 9 8 mg/dL      AST 25 U/L      ALT 52 U/L      Alkaline Phosphatase 279 U/L      Total Protein 7 9 g/dL      Albumin 3 6 g/dL      Total Bilirubin 1 05 mg/dL      eGFR 70 ml/min/1 73sq m     Narrative:      [facility] guidelines for Chronic Kidney Disease (CKD):     Stage 1 with normal or high GFR (GFR > 90 mL/min/1 73 square meters)    Stage 2 Mild CKD (GFR = 60-89 mL/min/1 73 square meters)    Stage 3A Moderate CKD (GFR = 45-59 mL/min/1 73 square meters)    Stage 3B Moderate CKD (GFR = 30-44 mL/min/1 73 square meters)    Stage 4 Severe CKD (GFR = 15-29 mL/min/1 73 square meters)    Stage 5 End Stage CKD (GFR <15 mL/min/1 73 square meters)  Note: GFR calculation is accurate only with a steady state creatinine    HS Troponin 0hr (reflex protocol) [[id number]]  (Normal) Collected: 08/22/22 0348    Lab Status: Final result Specimen: Blood from Arm, Right Updated: 08/22/22 0423     hs TnI 0hr 22 ng/L     Protime-INR [[id number]]  (Abnormal) Collected: 08/22/22 0348    Lab Status: Final result Specimen: Blood from Arm, Right Updated: 08/22/22 0421     Protime 25 8 seconds      INR 2 36    APTT [[id number]]  (Normal) Collected: 08/22/22 0348    Lab Status: Final result Specimen: Blood from Arm, Right Updated: 08/22/22 0421     PTT 32 seconds     CBC and differential [[id number]]  (Abnormal) Collected: 08/22/22 0348    Lab Status: Final result Specimen: Blood from Arm, Right Updated: 08/22/22 0414     WBC 7 57 Thousand/uL      RBC 5 10 Million/uL      Hemoglobin 11 8 g/dL      Hematocrit 41 0 %      MCV 80 fL      MCH 23 1 pg      MCHC 28 8 g/dL      RDW 19 1 %      MPV 9 5 fL      Platelets 309 Thousands/uL      nRBC 1 /100 WBCs      Neutrophils Relative 57 %      Immat GRANS % 1 %      Lymphocytes Relative 34 %      Monocytes Relative 6 %      Eosinophils Relative 1 %      Basophils Relative 1 %      Neutrophils Absolute 4 33 Thousands/µL      Immature Grans Absolute 0 04 Thousand/uL      Lymphocytes Absolute 2 60 Thousands/µL      Monocytes Absolute 0 44 Thousand/µL      Eosinophils Absolute 0 10 Thousand/µL      Basophils Absolute 0 06 Thousands/µL                  XR chest 1 view portable   Final Result by Guera Ramachandran MD (08/22 1112)      Enlargement of cardiac silhouette with small right effusion and adjacent right basilar subsegmental atelectasis                  Workstation performed: [id number]                    Procedures  Procedures         ED Course  ED Course as of 08/22/22 1957   Mon Aug 22, 2022   6226 0333:  Patient appears chronically ill, vital signs reviewed  Patient dyspneic, anxious  Placed on a monitor  EKG shows sinus tachycardia 101 beats per minute, no acute ischemia, poor R-wave progression, occasional PVCs, unchanged from previous  Plan to complete basic labs including cardiac enzymes, BNP, check coags, check chest x-ray  I will empirically give nitro paste and Lasix, re-evaluate  0430 0430:  Chest x-ray and labs reviewed  Plan to complete T2 and make disposition  8197 0615:  T2 reviewed  Patient has remained stable throughout ED course  Stable for discharge  Patient has appointment with vascular surgery this morning                                               MDM    Disposition  Final diagnoses:   Dyspnea, unspecified type   Chronic CHF (congestive heart failure) ([facility] 75 )   Anxiety     Time reflects when diagnosis was documented in both MDM as applicable and the Disposition within this note     Time User Action Codes Description Comment    8/22/2022  6:29 AM Shaun Barba [R06 00] Dyspnea, unspecified type     8/22/2022  6:29 AM Shaun Barba [I50 9] Chronic CHF (congestive heart failure) ([facility] 75 )     8/22/2022  6:29 AM Shaun Barba [F41 9] Anxiety       ED Disposition     ED Disposition   Discharge    Condition   Stable    Date/Time   Mon Aug 22, 2022  6:29 AM    Comment   Jose Gordon discharge to home/self care                 Follow-up Information     Follow up With Specialties Details Why Contact Info    Robert Tenorio DO  Schedule an appointment as soon as possible for a visit   [address] [place] [address]  [phone]      vascular surgery   as scheduled           Discharge Medication List as of 8/22/2022  6:30 AM      CONTINUE these medications which have NOT CHANGED    Details   acetaminophen (TYLENOL) 325 mg tablet Take 2 tablets (650 mg total) by mouth every 6 (six) hours as needed for mild pain, headaches or fever, Starting Thu 8/11/2022, No Print      atorvastatin (LIPITOR) 80 mg tablet Take 80 mg by mouth daily, Starting Tue 4/12/2022, Historical Med      benzonatate (TESSALON PERLES) 100 mg capsule Take 1 capsule (100 mg total) by mouth 3 (three) times a day as needed for cough, Starting Thu 8/11/2022, Normal      clopidogrel (PLAVIX) 75 mg tablet Take 75 mg by mouth daily, Starting Wed 4/13/2022, Historical Med      docusate sodium (COLACE) 100 mg capsule Take 1 capsule (100 mg total) by mouth 2 (two) times a day, Starting Thu 8/11/2022, Until Sat 9/10/2022, Normal      gabapentin (NEURONTIN) 600 MG tablet Take 600 mg by mouth daily as needed, Historical Med      melatonin 3 mg Take 2 tablets (6 mg total) by mouth daily at bedtime, Starting Thu 8/11/2022, Until Sat 9/10/2022, Normal      metoprolol succinate (TOPROL-XL) 25 mg 24 hr tablet Take 25 mg by mouth daily, Starting Wed 4/13/2022, Historical Med      oxyCODONE-acetaminophen (Percocet) 5-325 mg per tablet Take 1 tablet by mouth every 6 (six) hours as needed for moderate pain for up to 10 days Max Daily Amount: 4 tablets, Starting Thu 8/11/2022, Until Mon 8/22/2022 at 2359, Normal      polyethylene glycol (MIRALAX) 17 g packet Take 17 g by mouth daily, Starting Fri 8/12/2022, Until Sun 9/11/2022, Normal      potassium chloride (K-DUR,KLOR-CON) 20 mEq tablet Take 1 tablet (20 mEq total) by mouth 2 (two) times a day, Starting Wed 6/1/2022, Normal      torsemide (DEMADEX) 20 mg tablet Multiple Dosages:Starting Thu 8/11/2022, Until Sat 9/10/2022 at 2359Take 2 tablets (40 mg total) by mouth daily after breakfast AND 1 tablet (20 mg total) daily after lunch , Normal      warfarin (COUMADIN) 2 5 mg tablet Take 1 tablet (2 5 mg total) by mouth daily, Starting Thu 8/11/2022, No Print             No discharge procedures on file      PDMP Review       Value Time User    PDMP Reviewed  Yes 8/6/2022  4:01 PM Sole Murdock MD          ED Provider  Electronically Signed by           Haseeb Archer MD  08/22/22 6713

## 2022-09-05 ENCOUNTER — APPOINTMENT (OUTPATIENT)
Dept: RADIOLOGY | Facility: HOSPITAL | Age: 57
DRG: 194 | End: 2022-09-05
Payer: COMMERCIAL

## 2022-09-05 ENCOUNTER — APPOINTMENT (EMERGENCY)
Dept: CT IMAGING | Facility: HOSPITAL | Age: 57
DRG: 194 | End: 2022-09-05
Payer: COMMERCIAL

## 2022-09-05 ENCOUNTER — HOSPITAL ENCOUNTER (INPATIENT)
Facility: HOSPITAL | Age: 57
LOS: 4 days | Discharge: ANOTHER HOSPITAL | DRG: 194 | End: 2022-09-09
Attending: EMERGENCY MEDICINE | Admitting: FAMILY MEDICINE
Payer: COMMERCIAL

## 2022-09-05 ENCOUNTER — APPOINTMENT (EMERGENCY)
Dept: NON INVASIVE DIAGNOSTICS | Facility: HOSPITAL | Age: 57
DRG: 194 | End: 2022-09-05
Payer: COMMERCIAL

## 2022-09-05 DIAGNOSIS — M79.89 LEG SWELLING: ICD-10-CM

## 2022-09-05 DIAGNOSIS — J90 PLEURAL EFFUSION, RIGHT: ICD-10-CM

## 2022-09-05 DIAGNOSIS — Z72.0 TOBACCO ABUSE: ICD-10-CM

## 2022-09-05 DIAGNOSIS — J18.9 PNEUMONIA OF RIGHT LOWER LOBE DUE TO INFECTIOUS ORGANISM: Primary | ICD-10-CM

## 2022-09-05 DIAGNOSIS — R06.02 SHORTNESS OF BREATH: ICD-10-CM

## 2022-09-05 DIAGNOSIS — I73.9 PAD (PERIPHERAL ARTERY DISEASE) (HCC): ICD-10-CM

## 2022-09-05 DIAGNOSIS — R50.9 FEVER: ICD-10-CM

## 2022-09-05 DIAGNOSIS — E87.6 HYPOKALEMIA: ICD-10-CM

## 2022-09-05 DIAGNOSIS — A41.9 SEPSIS (HCC): ICD-10-CM

## 2022-09-05 DIAGNOSIS — I50.23 ACUTE ON CHRONIC SYSTOLIC CHF (CONGESTIVE HEART FAILURE) (HCC): ICD-10-CM

## 2022-09-05 LAB
2HR DELTA HS TROPONIN: 0 NG/L
ALBUMIN SERPL BCP-MCNC: 3.2 G/DL (ref 3.5–5)
ALP SERPL-CCNC: 258 U/L (ref 46–116)
ALT SERPL W P-5'-P-CCNC: 41 U/L (ref 12–78)
ANION GAP SERPL CALCULATED.3IONS-SCNC: 11 MMOL/L (ref 4–13)
AST SERPL W P-5'-P-CCNC: 29 U/L (ref 5–45)
ATRIAL RATE: 99 BPM
BASOPHILS # BLD AUTO: 0.04 THOUSANDS/ΜL (ref 0–0.1)
BASOPHILS NFR BLD AUTO: 1 % (ref 0–1)
BILIRUB SERPL-MCNC: 1.06 MG/DL (ref 0.2–1)
BUN SERPL-MCNC: 22 MG/DL (ref 5–25)
CALCIUM ALBUM COR SERPL-MCNC: 10.5 MG/DL (ref 8.3–10.1)
CALCIUM SERPL-MCNC: 9.9 MG/DL (ref 8.3–10.1)
CARDIAC TROPONIN I PNL SERPL HS: 23 NG/L
CARDIAC TROPONIN I PNL SERPL HS: 23 NG/L
CHLORIDE SERPL-SCNC: 97 MMOL/L (ref 96–108)
CO2 SERPL-SCNC: 28 MMOL/L (ref 21–32)
CREAT SERPL-MCNC: 0.84 MG/DL (ref 0.6–1.3)
D DIMER PPP FEU-MCNC: 3.44 UG/ML FEU
EOSINOPHIL # BLD AUTO: 0.07 THOUSAND/ΜL (ref 0–0.61)
EOSINOPHIL NFR BLD AUTO: 1 % (ref 0–6)
ERYTHROCYTE [DISTWIDTH] IN BLOOD BY AUTOMATED COUNT: 19.6 % (ref 11.6–15.1)
GFR SERPL CREATININE-BSD FRML MDRD: 98 ML/MIN/1.73SQ M
GLUCOSE SERPL-MCNC: 106 MG/DL (ref 65–140)
HCT VFR BLD AUTO: 38.6 % (ref 36.5–49.3)
HGB BLD-MCNC: 11.1 G/DL (ref 12–17)
IMM GRANULOCYTES # BLD AUTO: 0.03 THOUSAND/UL (ref 0–0.2)
IMM GRANULOCYTES NFR BLD AUTO: 1 % (ref 0–2)
INR PPP: 2.31 (ref 0.84–1.19)
LYMPHOCYTES # BLD AUTO: 1.62 THOUSANDS/ΜL (ref 0.6–4.47)
LYMPHOCYTES NFR BLD AUTO: 25 % (ref 14–44)
MAGNESIUM SERPL-MCNC: 2.1 MG/DL (ref 1.6–2.6)
MCH RBC QN AUTO: 22.2 PG (ref 26.8–34.3)
MCHC RBC AUTO-ENTMCNC: 28.8 G/DL (ref 31.4–37.4)
MCV RBC AUTO: 77 FL (ref 82–98)
MONOCYTES # BLD AUTO: 0.5 THOUSAND/ΜL (ref 0.17–1.22)
MONOCYTES NFR BLD AUTO: 8 % (ref 4–12)
NEUTROPHILS # BLD AUTO: 4.11 THOUSANDS/ΜL (ref 1.85–7.62)
NEUTS SEG NFR BLD AUTO: 64 % (ref 43–75)
NRBC BLD AUTO-RTO: 0 /100 WBCS
NT-PROBNP SERPL-MCNC: 3120 PG/ML
P AXIS: 71 DEGREES
PHOSPHATE SERPL-MCNC: 4.6 MG/DL (ref 2.7–4.5)
PLATELET # BLD AUTO: 293 THOUSANDS/UL (ref 149–390)
PMV BLD AUTO: 9.8 FL (ref 8.9–12.7)
POTASSIUM SERPL-SCNC: 2.7 MMOL/L (ref 3.5–5.3)
PR INTERVAL: 174 MS
PROT SERPL-MCNC: 6.9 G/DL (ref 6.4–8.4)
PROTHROMBIN TIME: 25.5 SECONDS (ref 11.6–14.5)
QRS AXIS: 95 DEGREES
QRSD INTERVAL: 116 MS
QT INTERVAL: 394 MS
QTC INTERVAL: 505 MS
RBC # BLD AUTO: 4.99 MILLION/UL (ref 3.88–5.62)
SODIUM SERPL-SCNC: 136 MMOL/L (ref 135–147)
T WAVE AXIS: 26 DEGREES
VENTRICULAR RATE: 99 BPM
WBC # BLD AUTO: 6.37 THOUSAND/UL (ref 4.31–10.16)

## 2022-09-05 PROCEDURE — 80053 COMPREHEN METABOLIC PANEL: CPT | Performed by: EMERGENCY MEDICINE

## 2022-09-05 PROCEDURE — 85610 PROTHROMBIN TIME: CPT | Performed by: EMERGENCY MEDICINE

## 2022-09-05 PROCEDURE — 71275 CT ANGIOGRAPHY CHEST: CPT

## 2022-09-05 PROCEDURE — 85025 COMPLETE CBC W/AUTO DIFF WBC: CPT | Performed by: EMERGENCY MEDICINE

## 2022-09-05 PROCEDURE — 85379 FIBRIN DEGRADATION QUANT: CPT | Performed by: EMERGENCY MEDICINE

## 2022-09-05 PROCEDURE — 83880 ASSAY OF NATRIURETIC PEPTIDE: CPT | Performed by: EMERGENCY MEDICINE

## 2022-09-05 PROCEDURE — 96374 THER/PROPH/DIAG INJ IV PUSH: CPT

## 2022-09-05 PROCEDURE — 84100 ASSAY OF PHOSPHORUS: CPT | Performed by: EMERGENCY MEDICINE

## 2022-09-05 PROCEDURE — G1004 CDSM NDSC: HCPCS

## 2022-09-05 PROCEDURE — 93970 EXTREMITY STUDY: CPT

## 2022-09-05 PROCEDURE — 84484 ASSAY OF TROPONIN QUANT: CPT | Performed by: EMERGENCY MEDICINE

## 2022-09-05 PROCEDURE — 99223 1ST HOSP IP/OBS HIGH 75: CPT | Performed by: FAMILY MEDICINE

## 2022-09-05 PROCEDURE — 83735 ASSAY OF MAGNESIUM: CPT | Performed by: EMERGENCY MEDICINE

## 2022-09-05 PROCEDURE — 96375 TX/PRO/DX INJ NEW DRUG ADDON: CPT

## 2022-09-05 PROCEDURE — 93005 ELECTROCARDIOGRAM TRACING: CPT

## 2022-09-05 PROCEDURE — 99285 EMERGENCY DEPT VISIT HI MDM: CPT | Performed by: EMERGENCY MEDICINE

## 2022-09-05 PROCEDURE — 71045 X-RAY EXAM CHEST 1 VIEW: CPT

## 2022-09-05 PROCEDURE — 99285 EMERGENCY DEPT VISIT HI MDM: CPT

## 2022-09-05 PROCEDURE — 36415 COLL VENOUS BLD VENIPUNCTURE: CPT | Performed by: EMERGENCY MEDICINE

## 2022-09-05 RX ORDER — FENTANYL CITRATE 50 UG/ML
25 INJECTION, SOLUTION INTRAMUSCULAR; INTRAVENOUS ONCE
Status: COMPLETED | OUTPATIENT
Start: 2022-09-05 | End: 2022-09-05

## 2022-09-05 RX ORDER — FUROSEMIDE 10 MG/ML
60 INJECTION INTRAMUSCULAR; INTRAVENOUS 2 TIMES DAILY
Status: DISCONTINUED | OUTPATIENT
Start: 2022-09-05 | End: 2022-09-06

## 2022-09-05 RX ORDER — POTASSIUM CHLORIDE 20 MEQ/1
40 TABLET, EXTENDED RELEASE ORAL DAILY
Status: DISCONTINUED | OUTPATIENT
Start: 2022-09-06 | End: 2022-09-06

## 2022-09-05 RX ORDER — ONDANSETRON 2 MG/ML
4 INJECTION INTRAMUSCULAR; INTRAVENOUS EVERY 6 HOURS PRN
Status: DISCONTINUED | OUTPATIENT
Start: 2022-09-05 | End: 2022-09-09 | Stop reason: HOSPADM

## 2022-09-05 RX ORDER — LANOLIN ALCOHOL/MO/W.PET/CERES
6 CREAM (GRAM) TOPICAL
Status: DISCONTINUED | OUTPATIENT
Start: 2022-09-05 | End: 2022-09-09 | Stop reason: HOSPADM

## 2022-09-05 RX ORDER — DOCUSATE SODIUM 100 MG/1
100 CAPSULE, LIQUID FILLED ORAL 2 TIMES DAILY
Status: DISCONTINUED | OUTPATIENT
Start: 2022-09-05 | End: 2022-09-09 | Stop reason: HOSPADM

## 2022-09-05 RX ORDER — ACETAMINOPHEN 325 MG/1
650 TABLET ORAL ONCE
Status: COMPLETED | OUTPATIENT
Start: 2022-09-05 | End: 2022-09-05

## 2022-09-05 RX ORDER — DIPHENHYDRAMINE HCL 25 MG
25 TABLET ORAL ONCE
Status: COMPLETED | OUTPATIENT
Start: 2022-09-05 | End: 2022-09-05

## 2022-09-05 RX ORDER — POTASSIUM CHLORIDE 20 MEQ/1
40 TABLET, EXTENDED RELEASE ORAL ONCE
Status: COMPLETED | OUTPATIENT
Start: 2022-09-05 | End: 2022-09-05

## 2022-09-05 RX ORDER — KETOROLAC TROMETHAMINE 30 MG/ML
15 INJECTION, SOLUTION INTRAMUSCULAR; INTRAVENOUS EVERY 6 HOURS PRN
Status: DISCONTINUED | OUTPATIENT
Start: 2022-09-05 | End: 2022-09-06

## 2022-09-05 RX ORDER — ACETAMINOPHEN 325 MG/1
650 TABLET ORAL EVERY 4 HOURS PRN
Status: DISCONTINUED | OUTPATIENT
Start: 2022-09-05 | End: 2022-09-09 | Stop reason: HOSPADM

## 2022-09-05 RX ORDER — POTASSIUM CHLORIDE 14.9 MG/ML
20 INJECTION INTRAVENOUS
Status: COMPLETED | OUTPATIENT
Start: 2022-09-05 | End: 2022-09-06

## 2022-09-05 RX ORDER — GABAPENTIN 300 MG/1
600 CAPSULE ORAL DAILY PRN
Status: DISCONTINUED | OUTPATIENT
Start: 2022-09-05 | End: 2022-09-09 | Stop reason: HOSPADM

## 2022-09-05 RX ORDER — FUROSEMIDE 10 MG/ML
80 INJECTION INTRAMUSCULAR; INTRAVENOUS ONCE
Status: COMPLETED | OUTPATIENT
Start: 2022-09-05 | End: 2022-09-05

## 2022-09-05 RX ORDER — POLYETHYLENE GLYCOL 3350 17 G/17G
17 POWDER, FOR SOLUTION ORAL DAILY PRN
Status: DISCONTINUED | OUTPATIENT
Start: 2022-09-05 | End: 2022-09-09 | Stop reason: HOSPADM

## 2022-09-05 RX ORDER — ATORVASTATIN CALCIUM 40 MG/1
80 TABLET, FILM COATED ORAL DAILY
Status: DISCONTINUED | OUTPATIENT
Start: 2022-09-05 | End: 2022-09-09 | Stop reason: HOSPADM

## 2022-09-05 RX ORDER — METOPROLOL SUCCINATE 25 MG/1
25 TABLET, EXTENDED RELEASE ORAL DAILY
Status: DISCONTINUED | OUTPATIENT
Start: 2022-09-06 | End: 2022-09-06

## 2022-09-05 RX ADMIN — ATORVASTATIN CALCIUM 80 MG: 40 TABLET, FILM COATED ORAL at 14:24

## 2022-09-05 RX ADMIN — POTASSIUM CHLORIDE 40 MEQ: 1500 TABLET, EXTENDED RELEASE ORAL at 11:14

## 2022-09-05 RX ADMIN — FUROSEMIDE 80 MG: 10 INJECTION, SOLUTION INTRAMUSCULAR; INTRAVENOUS at 11:21

## 2022-09-05 RX ADMIN — POTASSIUM CHLORIDE 20 MEQ: 14.9 INJECTION, SOLUTION INTRAVENOUS at 14:39

## 2022-09-05 RX ADMIN — POTASSIUM CHLORIDE 20 MEQ: 14.9 INJECTION, SOLUTION INTRAVENOUS at 16:49

## 2022-09-05 RX ADMIN — KETOROLAC TROMETHAMINE 15 MG: 30 INJECTION, SOLUTION INTRAMUSCULAR at 21:36

## 2022-09-05 RX ADMIN — IOHEXOL 85 ML: 350 INJECTION, SOLUTION INTRAVENOUS at 09:50

## 2022-09-05 RX ADMIN — ACETAMINOPHEN 650 MG: 325 TABLET ORAL at 08:52

## 2022-09-05 RX ADMIN — GABAPENTIN 600 MG: 300 CAPSULE ORAL at 18:46

## 2022-09-05 RX ADMIN — DIPHENHYDRAMINE HYDROCHLORIDE 25 MG: 25 TABLET ORAL at 21:39

## 2022-09-05 RX ADMIN — FUROSEMIDE 60 MG: 10 INJECTION, SOLUTION INTRAMUSCULAR; INTRAVENOUS at 16:50

## 2022-09-05 RX ADMIN — PHYTONADIONE 5 MG: 10 INJECTION, EMULSION INTRAMUSCULAR; INTRAVENOUS; SUBCUTANEOUS at 14:24

## 2022-09-05 RX ADMIN — FENTANYL CITRATE 25 MCG: 0.05 INJECTION, SOLUTION INTRAMUSCULAR; INTRAVENOUS at 10:04

## 2022-09-05 RX ADMIN — Medication 6 MG: at 21:39

## 2022-09-05 NOTE — PLAN OF CARE
Problem: Potential for Falls  Goal: Patient will remain free of falls  Description: INTERVENTIONS:  - Educate patient/family on patient safety including physical limitations  - Instruct patient to call for assistance with activity   - Consult OT/PT to assist with strengthening/mobility   - Keep Call bell within reach  - Keep bed low and locked with side rails adjusted as appropriate  - Keep care items and personal belongings within reach  - Initiate and maintain comfort rounds  - Make Fall Risk Sign visible to staff  - Offer Toileting every 2 Hours, in advance of need  - Apply yellow socks and bracelet for high fall risk patients  - Consider moving patient to room near nurses station  Outcome: Progressing     Problem: PAIN - ADULT  Goal: Verbalizes/displays adequate comfort level or baseline comfort level  Description: Interventions:  - Encourage patient to monitor pain and request assistance  - Assess pain using appropriate pain scale  - Administer analgesics based on type and severity of pain and evaluate response  - Implement non-pharmacological measures as appropriate and evaluate response  - Consider cultural and social influences on pain and pain management  - Notify physician/advanced practitioner if interventions unsuccessful or patient reports new pain  Outcome: Progressing     Problem: INFECTION - ADULT  Goal: Absence or prevention of progression during hospitalization  Description: INTERVENTIONS:  - Assess and monitor for signs and symptoms of infection  - Monitor lab/diagnostic results  - Monitor all insertion sites, i e  indwelling lines, tubes, and drains  - Monitor endotracheal if appropriate and nasal secretions for changes in amount and color  - Spencer appropriate cooling/warming therapies per order  - Administer medications as ordered  - Instruct and encourage patient and family to use good hand hygiene technique  - Identify and instruct in appropriate isolation precautions for identified infection/condition  Outcome: Progressing  Goal: Absence of fever/infection during neutropenic period  Description: INTERVENTIONS:  - Monitor WBC    Outcome: Progressing     Problem: SAFETY ADULT  Goal: Patient will remain free of falls  Description: INTERVENTIONS:  - Educate patient/family on patient safety including physical limitations  - Instruct patient to call for assistance with activity   - Consult OT/PT to assist with strengthening/mobility   - Keep Call bell within reach  - Keep bed low and locked with side rails adjusted as appropriate  - Keep care items and personal belongings within reach  - Initiate and maintain comfort rounds  - Make Fall Risk Sign visible to staff  - Offer Toileting every 2 Hours, in advance of need  - Apply yellow socks and bracelet for high fall risk patients  - Consider moving patient to room near nurses station  Outcome: Progressing  Goal: Maintain or return to baseline ADL function  Description: INTERVENTIONS:  -  Assess patient's ability to carry out ADLs; assess patient's baseline for ADL function and identify physical deficits which impact ability to perform ADLs (bathing, care of mouth/teeth, toileting, grooming, dressing, etc )  - Assess/evaluate cause of self-care deficits   - Assess range of motion  - Assess patient's mobility; develop plan if impaired  - Assess patient's need for assistive devices and provide as appropriate  - Encourage maximum independence but intervene and supervise when necessary  - Involve family in performance of ADLs  - Assess for home care needs following discharge   - Consider OT consult to assist with ADL evaluation and planning for discharge  - Provide patient education as appropriate  Outcome: Progressing  Goal: Maintains/Returns to pre admission functional level  Description: INTERVENTIONS:  - Perform BMAT or MOVE assessment daily    - Set and communicate daily mobility goal to care team and patient/family/caregiver     - Collaborate with rehabilitation services on mobility goals if consulted  - Perform Range of Motion 4 times a day  - Reposition patient every 2 hours  - Dangle patient 3 times a day  - Stand patient 3 times a day  - Ambulate patient 3 times a day  - Out of bed to chair 3 times a day   - Out of bed for meals 3 times a day  - Out of bed for toileting  - Record patient progress and toleration of activity level   Outcome: Progressing     Problem: DISCHARGE PLANNING  Goal: Discharge to home or other facility with appropriate resources  Description: INTERVENTIONS:  - Identify barriers to discharge w/patient and caregiver  - Arrange for needed discharge resources and transportation as appropriate  - Identify discharge learning needs (meds, wound care, etc )  - Arrange for interpretive services to assist at discharge as needed  - Refer to Case Management Department for coordinating discharge planning if the patient needs post-hospital services based on physician/advanced practitioner order or complex needs related to functional status, cognitive ability, or social support system  Outcome: Progressing     Problem: Knowledge Deficit  Goal: Patient/family/caregiver demonstrates understanding of disease process, treatment plan, medications, and discharge instructions  Description: Complete learning assessment and assess knowledge base    Interventions:  - Provide teaching at level of understanding  - Provide teaching via preferred learning methods  Outcome: Progressing     Problem: CARDIOVASCULAR - ADULT  Goal: Maintains optimal cardiac output and hemodynamic stability  Description: INTERVENTIONS:  - Monitor I/O, vital signs and rhythm  - Monitor for S/S and trends of decreased cardiac output  - Administer and titrate ordered vasoactive medications to optimize hemodynamic stability  - Assess quality of pulses, skin color and temperature  - Assess for signs of decreased coronary artery perfusion  - Instruct patient to report change in severity of symptoms  Outcome: Progressing  Goal: Absence of cardiac dysrhythmias or at baseline rhythm  Description: INTERVENTIONS:  - Continuous cardiac monitoring, vital signs, obtain 12 lead EKG if ordered  - Administer antiarrhythmic and heart rate control medications as ordered  - Monitor electrolytes and administer replacement therapy as ordered  Outcome: Progressing     Problem: HEMATOLOGIC - ADULT  Goal: Maintains hematologic stability  Description: INTERVENTIONS  - Assess for signs and symptoms of bleeding or hemorrhage  - Monitor labs  - Administer supportive blood products/factors as ordered and appropriate  Outcome: Progressing

## 2022-09-05 NOTE — H&P
[address]alen  H&P- Faiza Host 1965, 56 y o  male MRN: [id number]  Unit/Bed#: -01 Encounter: [id number]  Primary Care Provider: Margie Dominguez DO   Date and time admitted to hospital: 9/5/2022  8:01 AM    * Acute on chronic systolic CHF (congestive heart failure) (HCC)  Assessment & Plan  Wt Readings from Last 3 Encounters:   09/05/22 73 kg (160 lb 15 oz)   08/22/22 70 6 kg (155 lb 10 3 oz)   08/11/22 65 6 kg (144 lb 9 6 oz)     Presents to ED with shortness of breath and leg swelling   Has history of heart failure with multiple hospitalizations - recently discharged 08/11, Home regimen torsemide 20 mg b i d   o Has been taking as prescribed except recently has been increasing dose as he feels it was not working, last 2 days prior to admission has not taken any doses as he feels that has not been working  [facility]     CXR pending official read, pulmonary congestion and pleural effusion   BNP 3,120   EKG normal sinus rhythm with left atrial enlargement, rate 99, QTC of prolonged   Monitor on tele due to concurrent electrolyte abnormalities   ECHO April of 2022 revealed EF 25%, severe apical akinesis     Continue diuresis with IV Lasix 60 mg b i d    Continue to monitor electrolytes and replete as needed   Daily weights   Strict I & Os   Cardiac diet, low sodium <2g, fluid restriction <1500   Cardiology consult due to readmission, recommendation appreciated    Hypokalemia  Assessment & Plan  2 7 on admission  Continue supplementation  Recheck    Tobacco abuse  Assessment & Plan  Patient notes he quit yesterday  Encourage continued cessation-patient has multiple quit dates  Pulmonary follow-up for formal PFTs due to chronic nature of shortness of breath with activity    Pleural effusion, right  Assessment & Plan  · CTA PE study showing pulmonary edema with large right pleural effusion with subjacent compressive atelectasis  · Continue IV diuresis with Lasix  · Critical care consulted for possible thoracentesis  · holding Coumadin given vitamin K for reversal for reversal  · Plan for potential tomorrow      Chronic anticoagulation  Assessment & Plan  · Started on Coumadin to prevent thrombosis due to apical akinesis after recent cardiac catheterization  · INR is 2 3  · Critical care evaluation for thoracentesis  · Goal INR preprocedure is 1 8-give 1 dose of IV vitamin K 5 mg and hold Coumadin    PAD (peripheral artery disease) ([address] )  Assessment & Plan  Underwent aorto-bifemoral bypass artery and reports chronic leg pains but this is unchanged       Dyspnea on exertion  Assessment & Plan  Chronic in nature since stent placed in April  Current tobacco smoker-notes he quit last night, on chart review has multiple quit dates  Cessation encouraged  Follow-up with pulmonology for formal PFTs    VTE Pharmacologic Prophylaxis: VTE Score: 3 Moderate Risk (Score 3-4) - Pharmacological DVT Prophylaxis Contraindicated  Sequential Compression Devices Ordered  Code Status: Level 1 - Full Code discussed with patient  Discussion with family: Patient declined call to   Anticipated Length of Stay: Patient will be admitted on an inpatient basis with an anticipated length of stay of greater than 2 midnights secondary to CHF exacerbation, pleural effusion  Total Time for Visit, including Counseling / Coordination of Care: 60 minutes Greater than 50% of this total time spent on direct patient counseling and coordination of care  Chief Complaint:  Shortness of breath and leg swelling    History of Present Illness:  Clarissa Flores is a 56 y o  male with a PMH of systolic CHF, PAD, chronic anticoagulation, tobacco abuse, CAD with recent stenting who presents with shortness of breath and leg swelling  Recently discharged on 08/11 due to CHF exacerbation    Was started on torsemide 20 mg b i d   Patient reports doing okay for a couple weeks however notice torsemide seem to not be working to full effectiveness is start taking increased doses in the afternoon  The last 2 days noticed it seems to not be working much at all and did not take any dose of torsemide  Notes he eats a lot of fruits and vegetables, try stay away from [facility]  States he quit smoking 6 hours prior to presentation to the ER  Has been having continued dyspnea on exertion, unable to make it up 7 flights of stairs without needing to take a break  Denies any headache, chest pain, abdominal pain, nausea or vomiting, or fever/chills  Review of Systems:  Review of Systems   Constitutional: Negative for activity change, chills and fever  HENT: Negative for congestion, rhinorrhea and sore throat  Eyes: Negative for visual disturbance  Respiratory: Positive for cough and shortness of breath  Negative for chest tightness  Cardiovascular: Positive for leg swelling  Negative for chest pain and palpitations  Gastrointestinal: Negative for abdominal pain, constipation, diarrhea, nausea and vomiting  Genitourinary: Negative for difficulty urinating, dysuria, frequency and urgency  Musculoskeletal: Negative for arthralgias and myalgias  Skin: Negative for rash  Neurological: Negative for seizures, syncope, weakness and headaches  All other systems reviewed and are negative  Past Medical and Surgical History:   Past Medical History:   Diagnosis Date    [address] [facility]estive [facility]) [facility] [address]     Coronary artery disease     COVID     MI, old        Past Surgical History:   Procedure Laterality Date    ABDOMINAL SURGERY      CORONARY ANGIOPLASTY WITH STENT PLACEMENT  04/10/2022    x 2       Meds/Allergies:  Prior to Admission medications    Medication Sig Start Date End Date Taking?  Authorizing Provider   acetaminophen (TYLENOL) 325 mg tablet Take 2 tablets (650 mg total) by mouth every 6 (six) hours as needed for mild pain, headaches or fever 8/11/22   Stanislaw Cardoza MD atorvastatin (LIPITOR) 80 mg tablet Take 80 mg by mouth daily 4/12/22   Historical Provider, MD   benzonatate (TESSALON PERLES) 100 mg capsule Take 1 capsule (100 mg total) by mouth 3 (three) times a day as needed for cough 8/11/22   Levi Roach MD   clopidogrel (PLAVIX) 75 mg tablet Take 75 mg by mouth daily 4/13/22   Historical Provider, MD   docusate sodium (COLACE) 100 mg capsule Take 1 capsule (100 mg total) by mouth 2 (two) times a day 8/11/22 9/10/22  Levi Roach MD   gabapentin (NEURONTIN) 600 MG tablet Take 600 mg by mouth daily as needed    Historical Provider, MD   melatonin 3 mg Take 2 tablets (6 mg total) by mouth daily at bedtime 8/11/22 9/10/22  Levi Roach MD   metoprolol succinate (TOPROL-XL) 25 mg 24 hr tablet Take 25 mg by mouth daily 4/13/22   Historical Provider, MD   polyethylene glycol (MIRALAX) 17 g packet Take 17 g by mouth daily 8/12/22 9/11/22  Levi Roach MD   potassium chloride (K-DUR,KLOR-CON) 20 mEq tablet Take 1 tablet (20 mEq total) by mouth 2 (two) times a day 6/1/22   Aditi Vaz DO   torsemide (DEMADEX) 20 mg tablet Take 2 tablets (40 mg total) by mouth daily after breakfast AND 1 tablet (20 mg total) daily after lunch  Patient not taking: Reported on 9/5/2022 8/11/22 9/10/22  Levi Roach MD   warfarin (COUMADIN) 2 5 mg tablet Take 1 tablet (2 5 mg total) by mouth daily 8/11/22   Levi Roach MD     I have reviewed home medications with patient personally      Allergies: No Known Allergies    Social History:  Marital Status: Single   Occupation: Unknown  Patient Pre-hospital Living Situation: Home  Patient Pre-hospital Level of Mobility: walks  Patient Pre-hospital Diet Restrictions: Sodium and fluid restriction   Substance Use History:   Social History     Substance and Sexual Activity   Alcohol Use Yes     Social History     Tobacco Use   Smoking Status Current Every Day Smoker    Packs/day: 0 25    Types: Cigarettes   Smokeless Tobacco Never Used     Social History     Substance and Sexual Activity   Drug Use Not Currently       Family History:  History reviewed  No pertinent family history  Physical Exam:     Vitals:   Blood Pressure: 105/70 (09/05/22 1315)  Pulse: 92 (09/05/22 1315)  Temperature: 98 1 °F (36 7 °C) (09/05/22 0805)  Temp Source: Temporal (09/05/22 0805)  Respirations: (!) 25 (09/05/22 1315)  Height: 5' 6" (167 6 cm) (09/05/22 0805)  Weight - Scale: 73 kg (160 lb 15 oz) (09/05/22 0805)  SpO2: 96 % (09/05/22 1315)    Physical Exam  Vitals and nursing note reviewed  Constitutional:       General: He is not in acute distress  Appearance: Normal appearance  HENT:      Head: Normocephalic and atraumatic  Nose: No congestion  Mouth/Throat:      Mouth: Mucous membranes are moist    Eyes:      Conjunctiva/sclera: Conjunctivae normal    Cardiovascular:      Rate and Rhythm: Normal rate and regular rhythm  Pulses: Normal pulses  Heart sounds: Normal heart sounds  No murmur heard  Pulmonary:      Effort: Pulmonary effort is normal  No respiratory distress  Comments: Crackles over bilateral lung, worse on right  Abdominal:      General: Bowel sounds are normal       Palpations: Abdomen is soft  Tenderness: There is no abdominal tenderness  Musculoskeletal:         General: Normal range of motion  Right lower leg: Edema (Up to mid thigh, +3 pitting) present  Left lower leg: Edema (Up to mid thigh, +3 pitting) present  Skin:     General: Skin is warm and dry  Neurological:      Mental Status: He is alert and oriented to person, place, and time            Additional Data:     Lab Results:  Results from last 7 days   Lab Units 09/05/22  0815   WBC Thousand/uL 6 37   HEMOGLOBIN g/dL 11 1*   HEMATOCRIT % 38 6   PLATELETS Thousands/uL 293   NEUTROS PCT % 64   LYMPHS PCT % 25   MONOS PCT % 8   EOS PCT % 1     Results from last 7 days   Lab Units 09/05/22  0815   SODIUM mmol/L 136   POTASSIUM mmol/L 2 7*   CHLORIDE mmol/L 97   CO2 mmol/L 28   BUN mg/dL 22   CREATININE mg/dL 0 84   ANION GAP mmol/L 11   CALCIUM mg/dL 9 9   ALBUMIN g/dL 3 2*   TOTAL BILIRUBIN mg/dL 1 06*   ALK PHOS U/L 258*   ALT U/L 41   AST U/L 29   GLUCOSE RANDOM mg/dL 106     Results from last 7 days   Lab Units 09/05/22  0815   INR  2 31*                   Imaging: Reviewed radiology reports from this admission including: chest xray and chest CT scan  CTA ED chest PE Study   Final Result by Alen López MD (09/05 1018)      No evidence for pulmonary embolism  Pulmonary edema  Large right pleural effusion with subjacent compressive atelectasis  Workstation performed: [id number]         XR chest 1 view portable    (Results Pending)   VAS lower limb venous duplex study, complete bilateral    (Results Pending)       EKG and Other Studies Reviewed on Admission:   · EKG: NSR  HR Ninety-nine  ** Please Note: This note has been constructed using a voice recognition system   **

## 2022-09-05 NOTE — ASSESSMENT & PLAN NOTE
Chronic in nature since stent placed in April  Current tobacco smoker-notes he quit last night, on chart review has multiple quit dates  Cessation encouraged  Follow-up with pulmonology for formal PFTs

## 2022-09-05 NOTE — ASSESSMENT & PLAN NOTE
Patient notes he quit yesterday  Encourage continued cessation-patient has multiple quit dates  Pulmonary follow-up for formal PFTs due to chronic nature of shortness of breath with activity

## 2022-09-05 NOTE — ASSESSMENT & PLAN NOTE
· CTA PE study showing pulmonary edema with large right pleural effusion with subjacent compressive atelectasis  · Continue IV diuresis with Lasix  · Critical care consulted for possible thoracentesis  · holding Coumadin given vitamin K for reversal for reversal  · Plan for potential tomorrow

## 2022-09-05 NOTE — ED NOTES
While collecting blood work pt states to mother "they're only going to test my blood for drugs and alcohol bc they never believe me", explained to pt what blood work was ordered and what it tests for, stated that there is no orders for anything drug or alcohol related       Carla Maxwell, CLOVIS  09/05/22 5212

## 2022-09-05 NOTE — ASSESSMENT & PLAN NOTE
Wt Readings from Last 3 Encounters:   09/05/22 73 kg (160 lb 15 oz)   08/22/22 70 6 kg (155 lb 10 3 oz)   08/11/22 65 6 kg (144 lb 9 6 oz)     Presents to ED with shortness of breath and leg swelling   Has history of heart failure with multiple hospitalizations - recently discharged 08/11, Home regimen torsemide 20 mg b i d   o Has been taking as prescribed except recently has been increasing dose as he feels it was not working, last 2 days prior to admission has not taken any doses as he feels that has not been working  [facility] ED diuresed with 80 mg IV Lasix     CXR pending official read, pulmonary congestion and pleural effusion   BNP 3,120   EKG normal sinus rhythm with left atrial enlargement, rate 99, QTC of prolonged   Monitor on tele due to concurrent electrolyte abnormalities   ECHO April of 2022 revealed EF 25%, severe apical akinesis     Continue diuresis with IV Lasix 60 mg b i d    Continue to monitor electrolytes and replete as needed   Daily weights   Strict I & Os   Cardiac diet, low sodium <2g, fluid restriction <1500   Cardiology consult due to readmission, recommendation appreciated

## 2022-09-05 NOTE — ASSESSMENT & PLAN NOTE
· Started on Coumadin to prevent thrombosis due to apical akinesis after recent cardiac catheterization  · INR is 2 3  · Critical care evaluation for thoracentesis  · Goal INR preprocedure is 1 8-give 1 dose of IV vitamin K 5 mg and hold Coumadin

## 2022-09-05 NOTE — ED NOTES
Pt states tylenol "won't do shit! I need oxycodone", physician aware of same       Guerline Anderson, RN  09/05/22 2667

## 2022-09-05 NOTE — ED NOTES
At first pt refusing Lasix because "I don't want to be peeing down here, I want to have privacy to urinate and there is none here", states "I'll wait til i'm admitted to take it", Dr Genia Foley aware of same, per physician want to see if Lasix starts working prior to admission, pt made aware of same, agrees to taking Lasix, pt questioning this RN regarding medication vials, states "That isn't anything I've ever been given before and I know Lasix, that isn't it", showed pt medication vials that say furosemide on it, pt continues to states that "it's never anything I've ever seen before", pt also states "that isn't the right dose either bc I'll need way more that those 2 vials", explained that there is 40mg of Lasix in each vial and he is ordered 80mg, pt then agreeable to receive IV lasix, provided with urinal      Henry Valencia RN  09/05/22 [id number]

## 2022-09-05 NOTE — ED NOTES
TT with VAS on-call, Taurus Course will be here in approx 45 min       Maritza Thompson RN  09/05/22 [id number]

## 2022-09-05 NOTE — DISCHARGE INSTRUCTIONS
Thank you for letting us take care of you  You have been evaluated for shortness of breath and leg swelling  At this time, you have no clinical evidence of symptoms or problems that will require hospitalization, however you should be evaluated soon by a primary care physician, and contact information has been provided  Follow up with your primary care physician  This is important as many medical conditions can be managed as an outpatient, in addition to routine health screening  Seeing your primary doctor often can help identify changes in the medical issue that brought you to the ED for care today  If you experience any new symptoms or acute worsening of current symptoms, please return to the ED

## 2022-09-05 NOTE — ED NOTES
Pt ambulated w pulse ox99 on room air half way around er pt needed to take a break very sob r23 once pt caught breath in a 2 min period pt walked back to room no change in pulse ox 99      Alba Crews  09/05/22 1212

## 2022-09-06 ENCOUNTER — APPOINTMENT (INPATIENT)
Dept: CT IMAGING | Facility: HOSPITAL | Age: 57
DRG: 194 | End: 2022-09-06
Payer: COMMERCIAL

## 2022-09-06 ENCOUNTER — APPOINTMENT (INPATIENT)
Dept: NON INVASIVE DIAGNOSTICS | Facility: HOSPITAL | Age: 57
DRG: 194 | End: 2022-09-06
Payer: COMMERCIAL

## 2022-09-06 ENCOUNTER — APPOINTMENT (OUTPATIENT)
Dept: RADIOLOGY | Facility: HOSPITAL | Age: 57
DRG: 194 | End: 2022-09-06
Payer: COMMERCIAL

## 2022-09-06 PROBLEM — I25.10 CAD (CORONARY ARTERY DISEASE): Status: ACTIVE | Noted: 2022-09-06

## 2022-09-06 PROBLEM — A41.9 SEPSIS (HCC): Status: ACTIVE | Noted: 2022-09-06

## 2022-09-06 PROBLEM — R06.03 ACUTE RESPIRATORY DISTRESS: Status: ACTIVE | Noted: 2022-08-06

## 2022-09-06 PROBLEM — N17.9 AKI (ACUTE KIDNEY INJURY) (HCC): Status: ACTIVE | Noted: 2022-09-06

## 2022-09-06 PROBLEM — R07.9 CHEST PAIN: Status: ACTIVE | Noted: 2022-09-06

## 2022-09-06 LAB
2HR DELTA HS TROPONIN: 38 NG/L
4HR DELTA HS TROPONIN: 102 NG/L
ANION GAP SERPL CALCULATED.3IONS-SCNC: 15 MMOL/L (ref 4–13)
ANION GAP SERPL CALCULATED.3IONS-SCNC: 19 MMOL/L (ref 4–13)
AORTIC ROOT: 3.2 CM
APICAL FOUR CHAMBER EJECTION FRACTION: 13 %
ARTERIAL PATENCY WRIST A: NO
ASCENDING AORTA: 3.8 CM
BASE EX.OXY STD BLDV CALC-SCNC: 40.6 % (ref 60–80)
BASE EX.OXY STD BLDV CALC-SCNC: 68.3 % (ref 60–80)
BASE EXCESS BLDV CALC-SCNC: -3.3 MMOL/L
BASE EXCESS BLDV CALC-SCNC: -6.8 MMOL/L
BILIRUB UR QL STRIP: ABNORMAL
BODY TEMPERATURE: 100.8 DEGREES FEHRENHEIT
BUN SERPL-MCNC: 31 MG/DL (ref 5–25)
BUN SERPL-MCNC: 40 MG/DL (ref 5–25)
CALCIUM SERPL-MCNC: 9 MG/DL (ref 8.3–10.1)
CALCIUM SERPL-MCNC: 9.8 MG/DL (ref 8.3–10.1)
CARDIAC TROPONIN I PNL SERPL HS: 150 NG/L
CARDIAC TROPONIN I PNL SERPL HS: 48 NG/L
CARDIAC TROPONIN I PNL SERPL HS: 86 NG/L
CHLORIDE SERPL-SCNC: 97 MMOL/L (ref 96–108)
CHLORIDE SERPL-SCNC: 98 MMOL/L (ref 96–108)
CLARITY UR: ABNORMAL
CO2 SERPL-SCNC: 21 MMOL/L (ref 21–32)
CO2 SERPL-SCNC: 24 MMOL/L (ref 21–32)
COLOR UR: YELLOW
CREAT SERPL-MCNC: 1.35 MG/DL (ref 0.6–1.3)
CREAT SERPL-MCNC: 1.62 MG/DL (ref 0.6–1.3)
D DIMER PPP FEU-MCNC: 3.47 UG/ML FEU
ERYTHROCYTE [DISTWIDTH] IN BLOOD BY AUTOMATED COUNT: 19.5 % (ref 11.6–15.1)
ERYTHROCYTE [DISTWIDTH] IN BLOOD BY AUTOMATED COUNT: 19.9 % (ref 11.6–15.1)
FRACTIONAL SHORTENING: 9 (ref 28–44)
GFR SERPL CREATININE-BSD FRML MDRD: 47 ML/MIN/1.73SQ M
GFR SERPL CREATININE-BSD FRML MDRD: 58 ML/MIN/1.73SQ M
GLUCOSE SERPL-MCNC: 101 MG/DL (ref 65–140)
GLUCOSE SERPL-MCNC: 90 MG/DL (ref 65–140)
GLUCOSE UR STRIP-MCNC: NEGATIVE MG/DL
HCO3 BLDV-SCNC: 19.7 MMOL/L (ref 24–30)
HCO3 BLDV-SCNC: 20.7 MMOL/L (ref 24–30)
HCT VFR BLD AUTO: 35.5 % (ref 36.5–49.3)
HCT VFR BLD AUTO: 40.9 % (ref 36.5–49.3)
HGB BLD-MCNC: 10.5 G/DL (ref 12–17)
HGB BLD-MCNC: 11.7 G/DL (ref 12–17)
HGB UR QL STRIP.AUTO: NEGATIVE
INR PPP: 1.82 (ref 0.84–1.19)
INR PPP: 1.96 (ref 0.84–1.19)
INTERVENTRICULAR SEPTUM IN DIASTOLE (PARASTERNAL SHORT AXIS VIEW): 0.6 CM
INTERVENTRICULAR SEPTUM: 0.6 CM (ref 0.6–1.1)
KETONES UR STRIP-MCNC: NEGATIVE MG/DL
LAAS-AP4: 23.1 CM2
LACTATE SERPL-SCNC: 3.5 MMOL/L (ref 0.5–2)
LACTATE SERPL-SCNC: 6.5 MMOL/L (ref 0.5–2)
LACTATE SERPL-SCNC: 9.3 MMOL/L (ref 0.5–2)
LEFT ATRIUM SIZE: 4.1 CM
LEFT INTERNAL DIMENSION IN SYSTOLE: 6 CM (ref 2.1–4)
LEFT VENTRICLE DIASTOLIC VOLUME (MOD BIPLANE): 207 ML
LEFT VENTRICLE SYSTOLIC VOLUME (MOD BIPLANE): 186 ML
LEFT VENTRICULAR INTERNAL DIMENSION IN DIASTOLE: 6.6 CM (ref 3.5–6)
LEFT VENTRICULAR POSTERIOR WALL IN END DIASTOLE: 0.7 CM
LEFT VENTRICULAR STROKE VOLUME: 44 ML
LEUKOCYTE ESTERASE UR QL STRIP: NEGATIVE
LV EF: 10 %
LVSV (TEICH): 44 ML
MAGNESIUM SERPL-MCNC: 2 MG/DL (ref 1.6–2.6)
MAGNESIUM SERPL-MCNC: 2.2 MG/DL (ref 1.6–2.6)
MCH RBC QN AUTO: 22.4 PG (ref 26.8–34.3)
MCH RBC QN AUTO: 22.7 PG (ref 26.8–34.3)
MCHC RBC AUTO-ENTMCNC: 28.6 G/DL (ref 31.4–37.4)
MCHC RBC AUTO-ENTMCNC: 29.6 G/DL (ref 31.4–37.4)
MCV RBC AUTO: 77 FL (ref 82–98)
MCV RBC AUTO: 78 FL (ref 82–98)
NASAL CANNULA: 4
NITRITE UR QL STRIP: NEGATIVE
O2 CT BLDV-SCNC: 13.8 ML/DL
O2 CT BLDV-SCNC: 7.1 ML/DL
PCO2 BLD: 36.3 MM HG (ref 42–50)
PCO2 BLDV: 34.4 MM HG (ref 42–50)
PCO2 BLDV: 43.4 MM HG (ref 42–50)
PH BLD: 7.38 [PH] (ref 7.3–7.4)
PH BLDV: 7.28 [PH] (ref 7.3–7.4)
PH BLDV: 7.4 [PH] (ref 7.3–7.4)
PH UR STRIP.AUTO: 5.5 [PH]
PHOSPHATE SERPL-MCNC: 5.5 MG/DL (ref 2.7–4.5)
PLATELET # BLD AUTO: 306 THOUSANDS/UL (ref 149–390)
PLATELET # BLD AUTO: 317 THOUSANDS/UL (ref 149–390)
PMV BLD AUTO: 9.5 FL (ref 8.9–12.7)
PMV BLD AUTO: 9.7 FL (ref 8.9–12.7)
PO2 BLDV: 31.5 MM HG (ref 35–45)
PO2 BLDV: 43 MM HG (ref 35–45)
PO2 VENOUS TEMP CORRECTED: 46.8 MM HG (ref 35–45)
POTASSIUM SERPL-SCNC: 3.1 MMOL/L (ref 3.5–5.3)
POTASSIUM SERPL-SCNC: 4.1 MMOL/L (ref 3.5–5.3)
PROCALCITONIN SERPL-MCNC: 0.82 NG/ML
PROT UR STRIP-MCNC: NEGATIVE MG/DL
PROTHROMBIN TIME: 21.1 SECONDS (ref 11.6–14.5)
PROTHROMBIN TIME: 22.5 SECONDS (ref 11.6–14.5)
RBC # BLD AUTO: 4.63 MILLION/UL (ref 3.88–5.62)
RBC # BLD AUTO: 5.22 MILLION/UL (ref 3.88–5.62)
RIGHT ATRIAL 2D VOLUME: 74 ML
RIGHT ATRIUM AREA SYSTOLE A4C: 23.9 CM2
RIGHT VENTRICLE ID DIMENSION: 4.2 CM
SL CV PED ECHO LEFT VENTRICLE DIASTOLIC VOLUME (MOD BIPLANE) 2D: 226 ML
SL CV PED ECHO LEFT VENTRICLE SYSTOLIC VOLUME (MOD BIPLANE) 2D: 182 ML
SODIUM SERPL-SCNC: 137 MMOL/L (ref 135–147)
SODIUM SERPL-SCNC: 137 MMOL/L (ref 135–147)
SP GR UR STRIP.AUTO: 1.02 (ref 1–1.03)
TR MAX PG: 41 MMHG
TR PEAK VELOCITY: 3.2 M/S
TRICUSPID VALVE PEAK REGURGITATION VELOCITY: 3.21 M/S
UROBILINOGEN UR QL STRIP.AUTO: 1 E.U./DL
WBC # BLD AUTO: 6.92 THOUSAND/UL (ref 4.31–10.16)
WBC # BLD AUTO: 8.38 THOUSAND/UL (ref 4.31–10.16)

## 2022-09-06 PROCEDURE — 99232 SBSQ HOSP IP/OBS MODERATE 35: CPT

## 2022-09-06 PROCEDURE — 83735 ASSAY OF MAGNESIUM: CPT

## 2022-09-06 PROCEDURE — 93308 TTE F-UP OR LMTD: CPT | Performed by: ANESTHESIOLOGY

## 2022-09-06 PROCEDURE — 74176 CT ABD & PELVIS W/O CONTRAST: CPT

## 2022-09-06 PROCEDURE — 83735 ASSAY OF MAGNESIUM: CPT | Performed by: NURSE PRACTITIONER

## 2022-09-06 PROCEDURE — 85610 PROTHROMBIN TIME: CPT

## 2022-09-06 PROCEDURE — 85027 COMPLETE CBC AUTOMATED: CPT | Performed by: NURSE PRACTITIONER

## 2022-09-06 PROCEDURE — 0W993ZZ DRAINAGE OF RIGHT PLEURAL CAVITY, PERCUTANEOUS APPROACH: ICD-10-PCS | Performed by: ANESTHESIOLOGY

## 2022-09-06 PROCEDURE — 84145 PROCALCITONIN (PCT): CPT | Performed by: NURSE PRACTITIONER

## 2022-09-06 PROCEDURE — 93005 ELECTROCARDIOGRAM TRACING: CPT

## 2022-09-06 PROCEDURE — 81003 URINALYSIS AUTO W/O SCOPE: CPT | Performed by: NURSE PRACTITIONER

## 2022-09-06 PROCEDURE — 83605 ASSAY OF LACTIC ACID: CPT | Performed by: NURSE PRACTITIONER

## 2022-09-06 PROCEDURE — 84484 ASSAY OF TROPONIN QUANT: CPT | Performed by: PHYSICIAN ASSISTANT

## 2022-09-06 PROCEDURE — 71250 CT THORAX DX C-: CPT

## 2022-09-06 PROCEDURE — 82805 BLOOD GASES W/O2 SATURATION: CPT

## 2022-09-06 PROCEDURE — 80048 BASIC METABOLIC PNL TOTAL CA: CPT

## 2022-09-06 PROCEDURE — 99291 CRITICAL CARE FIRST HOUR: CPT | Performed by: NURSE PRACTITIONER

## 2022-09-06 PROCEDURE — 84100 ASSAY OF PHOSPHORUS: CPT | Performed by: NURSE PRACTITIONER

## 2022-09-06 PROCEDURE — G1004 CDSM NDSC: HCPCS

## 2022-09-06 PROCEDURE — 85610 PROTHROMBIN TIME: CPT | Performed by: NURSE PRACTITIONER

## 2022-09-06 PROCEDURE — 93970 EXTREMITY STUDY: CPT | Performed by: SURGERY

## 2022-09-06 PROCEDURE — 87449 NOS EACH ORGANISM AG IA: CPT | Performed by: NURSE PRACTITIONER

## 2022-09-06 PROCEDURE — 93306 TTE W/DOPPLER COMPLETE: CPT

## 2022-09-06 PROCEDURE — 82805 BLOOD GASES W/O2 SATURATION: CPT | Performed by: NURSE PRACTITIONER

## 2022-09-06 PROCEDURE — 85027 COMPLETE CBC AUTOMATED: CPT

## 2022-09-06 PROCEDURE — 32555 ASPIRATE PLEURA W/ IMAGING: CPT | Performed by: ANESTHESIOLOGY

## 2022-09-06 PROCEDURE — 87081 CULTURE SCREEN ONLY: CPT | Performed by: PHYSICIAN ASSISTANT

## 2022-09-06 PROCEDURE — 71045 X-RAY EXAM CHEST 1 VIEW: CPT

## 2022-09-06 PROCEDURE — 94760 N-INVAS EAR/PLS OXIMETRY 1: CPT

## 2022-09-06 PROCEDURE — 85379 FIBRIN DEGRADATION QUANT: CPT | Performed by: NURSE PRACTITIONER

## 2022-09-06 PROCEDURE — 84484 ASSAY OF TROPONIN QUANT: CPT

## 2022-09-06 PROCEDURE — 87040 BLOOD CULTURE FOR BACTERIA: CPT | Performed by: NURSE PRACTITIONER

## 2022-09-06 PROCEDURE — NC001 PR NO CHARGE: Performed by: ANESTHESIOLOGY

## 2022-09-06 RX ORDER — HYDROXYZINE HYDROCHLORIDE 25 MG/1
50 TABLET, FILM COATED ORAL EVERY 6 HOURS PRN
Status: DISCONTINUED | OUTPATIENT
Start: 2022-09-06 | End: 2022-09-07

## 2022-09-06 RX ORDER — OXYCODONE HYDROCHLORIDE 5 MG/1
5 TABLET ORAL EVERY 6 HOURS PRN
Status: DISCONTINUED | OUTPATIENT
Start: 2022-09-06 | End: 2022-09-06

## 2022-09-06 RX ORDER — CEFEPIME HYDROCHLORIDE 2 G/50ML
2000 INJECTION, SOLUTION INTRAVENOUS EVERY 12 HOURS
Status: DISCONTINUED | OUTPATIENT
Start: 2022-09-06 | End: 2022-09-09

## 2022-09-06 RX ORDER — HYDROMORPHONE HCL IN WATER/PF 6 MG/30 ML
0.1 PATIENT CONTROLLED ANALGESIA SYRINGE INTRAVENOUS EVERY 4 HOURS PRN
Status: DISCONTINUED | OUTPATIENT
Start: 2022-09-06 | End: 2022-09-09

## 2022-09-06 RX ORDER — FUROSEMIDE 10 MG/ML
15 SYRINGE (ML) INJECTION CONTINUOUS
Status: DISCONTINUED | OUTPATIENT
Start: 2022-09-06 | End: 2022-09-09 | Stop reason: HOSPADM

## 2022-09-06 RX ORDER — CLOPIDOGREL BISULFATE 75 MG/1
75 TABLET ORAL DAILY
Status: DISCONTINUED | OUTPATIENT
Start: 2022-09-06 | End: 2022-09-09 | Stop reason: HOSPADM

## 2022-09-06 RX ORDER — ACETAMINOPHEN 325 MG/1
975 TABLET ORAL EVERY 6 HOURS PRN
Status: DISCONTINUED | OUTPATIENT
Start: 2022-09-06 | End: 2022-09-06

## 2022-09-06 RX ORDER — MIDAZOLAM HYDROCHLORIDE 2 MG/2ML
1 INJECTION, SOLUTION INTRAMUSCULAR; INTRAVENOUS ONCE
Status: COMPLETED | OUTPATIENT
Start: 2022-09-06 | End: 2022-09-06

## 2022-09-06 RX ORDER — HYDROXYZINE HYDROCHLORIDE 25 MG/1
25 TABLET, FILM COATED ORAL EVERY 6 HOURS PRN
Status: DISCONTINUED | OUTPATIENT
Start: 2022-09-06 | End: 2022-09-06

## 2022-09-06 RX ORDER — BUMETANIDE 0.25 MG/ML
0.5 INJECTION, SOLUTION INTRAMUSCULAR; INTRAVENOUS ONCE
Status: COMPLETED | OUTPATIENT
Start: 2022-09-06 | End: 2022-09-06

## 2022-09-06 RX ORDER — OXYCODONE HYDROCHLORIDE 5 MG/1
5 TABLET ORAL EVERY 6 HOURS PRN
Status: DISCONTINUED | OUTPATIENT
Start: 2022-09-06 | End: 2022-09-07

## 2022-09-06 RX ORDER — LIDOCAINE HYDROCHLORIDE 10 MG/ML
10 INJECTION, SOLUTION EPIDURAL; INFILTRATION; INTRACAUDAL; PERINEURAL ONCE
Status: COMPLETED | OUTPATIENT
Start: 2022-09-06 | End: 2022-09-06

## 2022-09-06 RX ORDER — HYDROMORPHONE HCL IN WATER/PF 6 MG/30 ML
0.1 PATIENT CONTROLLED ANALGESIA SYRINGE INTRAVENOUS ONCE
Status: COMPLETED | OUTPATIENT
Start: 2022-09-06 | End: 2022-09-06

## 2022-09-06 RX ORDER — POTASSIUM CHLORIDE 20 MEQ/1
40 TABLET, EXTENDED RELEASE ORAL 2 TIMES DAILY WITH MEALS
Status: DISCONTINUED | OUTPATIENT
Start: 2022-09-06 | End: 2022-09-09 | Stop reason: HOSPADM

## 2022-09-06 RX ADMIN — Medication 10 MG/HR: at 11:16

## 2022-09-06 RX ADMIN — BUMETANIDE 0.5 MG: 0.25 INJECTION, SOLUTION INTRAMUSCULAR; INTRAVENOUS at 16:13

## 2022-09-06 RX ADMIN — PERFLUTREN 0.4 ML/MIN: 6.52 INJECTION, SUSPENSION INTRAVENOUS at 10:52

## 2022-09-06 RX ADMIN — HYDROMORPHONE HYDROCHLORIDE 0.1 MG: 0.2 INJECTION, SOLUTION INTRAMUSCULAR; INTRAVENOUS; SUBCUTANEOUS at 16:12

## 2022-09-06 RX ADMIN — ACETAMINOPHEN 650 MG: 325 TABLET ORAL at 17:17

## 2022-09-06 RX ADMIN — CEFEPIME HYDROCHLORIDE 2000 MG: 2 INJECTION, SOLUTION INTRAVENOUS at 17:43

## 2022-09-06 RX ADMIN — FUROSEMIDE 60 MG: 10 INJECTION, SOLUTION INTRAMUSCULAR; INTRAVENOUS at 08:54

## 2022-09-06 RX ADMIN — MIDAZOLAM 1 MG: 1 INJECTION INTRAMUSCULAR; INTRAVENOUS at 11:38

## 2022-09-06 RX ADMIN — KETOROLAC TROMETHAMINE 15 MG: 30 INJECTION, SOLUTION INTRAMUSCULAR at 03:02

## 2022-09-06 RX ADMIN — ATORVASTATIN CALCIUM 80 MG: 40 TABLET, FILM COATED ORAL at 08:54

## 2022-09-06 RX ADMIN — METOPROLOL SUCCINATE 25 MG: 25 TABLET, EXTENDED RELEASE ORAL at 08:53

## 2022-09-06 RX ADMIN — DOCUSATE SODIUM 100 MG: 100 CAPSULE ORAL at 08:54

## 2022-09-06 RX ADMIN — GABAPENTIN 600 MG: 300 CAPSULE ORAL at 10:29

## 2022-09-06 RX ADMIN — HYDROXYZINE HYDROCHLORIDE 25 MG: 25 TABLET ORAL at 10:29

## 2022-09-06 RX ADMIN — POTASSIUM CHLORIDE 40 MEQ: 1500 TABLET, EXTENDED RELEASE ORAL at 08:54

## 2022-09-06 RX ADMIN — CLOPIDOGREL BISULFATE 75 MG: 75 TABLET ORAL at 18:28

## 2022-09-06 RX ADMIN — POTASSIUM CHLORIDE 40 MEQ: 1500 TABLET, EXTENDED RELEASE ORAL at 17:17

## 2022-09-06 RX ADMIN — LIDOCAINE HYDROCHLORIDE 10 ML: 10 INJECTION, SOLUTION EPIDURAL; INFILTRATION; INTRACAUDAL; PERINEURAL at 11:45

## 2022-09-06 RX ADMIN — Medication 6 MG: at 21:23

## 2022-09-06 RX ADMIN — DOCUSATE SODIUM 100 MG: 100 CAPSULE ORAL at 17:17

## 2022-09-06 RX ADMIN — POTASSIUM CHLORIDE 40 MEQ: 1500 TABLET, EXTENDED RELEASE ORAL at 10:29

## 2022-09-06 RX ADMIN — VANCOMYCIN HYDROCHLORIDE 750 MG: 750 INJECTION, SOLUTION INTRAVENOUS at 18:28

## 2022-09-06 NOTE — ASSESSMENT & PLAN NOTE
· Baseline appears to be 0 8-1  · Now 1 66  · 2/2 to diuresis  · Avoid nephrotoxins  · Trend renal indices

## 2022-09-06 NOTE — ASSESSMENT & PLAN NOTE
· 2 7 on admission - supplemented with 40 IV and 40 oral on admission  · Continue supplementation  · Recheck this AM is 3 1 - continue 40 mg BID oral

## 2022-09-06 NOTE — CONSULTS
Consultation - Cardiology   Jacek Chisholm 56 y o  male MRN: [id number]  Unit/Bed#: -01 Encounter: [id number]    Assessment/Plan     Assessment:  Acute on chronic HFrEF with R pleural effusion (previous thoracentesis was transudative)              - on torsemide 20 mg PO BID as an OP              - hypokalemic- replaced              - planning for thoracentesis today or tomorrow per ICU team   Ischemic cardiomyopathy- on BB but not losartan currently  CAD sp STEMI 4/10/22 sp PCI To LAD and PCI to prox circ  Apical akinesis on coumadin (on hold for thoracentesis)  PAD  Smoking  anxiety    Plan: 1  Stop IV lasix boluses  Start lasix drip at 10 mg/hr  2  kdur 40 meq PO BID  3  Echo pending but at bedside LVEF appears to be lower, now 10%  Consider life vest given persistently low LVEF  4  Monitor I and O, daily standing weights, renal function and electrolytes  BMP BID  5  No further changes in regimen at this time    History of Present Illness   Physician Requesting Consult: Steph Xie MD  Reason for Consult / Principal Problem: CHFrEF  HPI: Jacek Chisholm is a 56y o  year old male with history of CAD sp PCI to LAD and prox circ in April 2022, apical akinesis on coumadin, PAD, smoking, anxiety, CHFrEF who presented to the ED for concerns with sob and edema  Patient reported that he had been compliant with his diet and fluid restriction and noted worsening edema and sob  He up titrated his torsemide on his own but without improvement of his urinary output  At that time he decided top stop his torsemide for a few days  Due to worsening symptoms he presented to the ED  He reports no chest pain but notes significant edema and SOB  Echo is currently pending  He was started on IV lasix and is negative 1 9 L since admission  He was planning for repeat echo as an OP today to assess need for ICD  He was found to have a R pleural effusion which was previously noted during admission in august 2022   At that time he had a thoracentesis and fluid was transudative  His coumadin was reversed on admission with plans for repeat thoracentesis  Currently at the bedside patient reports significant anxiety but no chest pain  Reports it is very difficult to breath and because of his anxiety he cannot lay down at all  He reports that he quit smoking a few hours before he got to the hospital  Nursing staff concerned he may be smoking in his hospital room  Inpatient consult to Cardiology  Consult performed by: Michelle Ochoa PA-C  Consult ordered by: Justine Maria PA-C          Review of Systems   Constitutional: Positive for fatigue and unexpected weight change  Negative for chills  Respiratory: Positive for shortness of breath  Negative for chest tightness and wheezing  Cardiovascular: Positive for leg swelling  Negative for chest pain and palpitations  Gastrointestinal: Negative for nausea  Musculoskeletal: Positive for myalgias  Skin: Negative for color change, pallor and rash  Neurological: Negative for dizziness, weakness and light-headedness  Psychiatric/Behavioral: Negative for agitation, behavioral problems and confusion         Historical Information   Past Medical History:   Diagnosis Date    [facility]F [facility]congestive heart failure) ([facility] [address] )     Coronary artery disease     COVID     MI, old      Past Surgical History:   Procedure Laterality Date    ABDOMINAL SURGERY      CORONARY ANGIOPLASTY WITH STENT PLACEMENT  04/10/2022    x 2     Social History     Substance and Sexual Activity   Alcohol Use Yes     Social History     Substance and Sexual Activity   Drug Use Not Currently     E-Cigarette/Vaping    E-Cigarette Use Never User      E-Cigarette/Vaping Substances     Social History     Tobacco Use   Smoking Status Current Every Day Smoker    Packs/day: 0 25    Types: Cigarettes   Smokeless Tobacco Never Used     Family History: non-contributory    Meds/Allergies   all current active meds have been reviewed  No Known Allergies    Objective   Vitals: Blood pressure 129/90, pulse 76, temperature 97 5 °F (36 4 °C), resp  rate 19, height 5' 5" (1 651 m), weight 75 3 kg (166 lb), SpO2 90 %  Orthostatic Blood Pressures    Flowsheet Row Most Recent Value   Blood Pressure 129/90 filed at 09/06/2022 1030   Patient Position - Orthostatic VS Lying filed at 09/05/2022 1245            Intake/Output Summary (Last 24 hours) at 9/6/2022 1050  Last data filed at 9/6/2022 0847  Gross per 24 hour   Intake 1210 ml   Output 3175 ml   Net -1965 ml       Invasive Devices  Report    Peripheral Intravenous Line  Duration           Peripheral IV 09/05/22 Left Antecubital 1 day                Physical Exam  Constitutional:       Appearance: Normal appearance  HENT:      Head: Normocephalic and atraumatic  Cardiovascular:      Rate and Rhythm: Normal rate  Heart sounds: No murmur heard  No gallop  Pulmonary:      Breath sounds: No wheezing  Comments: Decreased breath sounds in R lung diffusely  Abdominal:      Palpations: Abdomen is soft  Musculoskeletal:         General: Swelling present  Cervical back: Neck supple  Skin:     General: Skin is warm and dry  Capillary Refill: Capillary refill takes less than 2 seconds  Neurological:      General: No focal deficit present  Mental Status: He is alert and oriented to person, place, and time  Lab Results:   I have personally reviewed pertinent lab results      CBC with diff:   Results from last 7 days   Lab Units 09/06/22  0436   WBC Thousand/uL 6 92   RBC Million/uL 4 63   HEMOGLOBIN g/dL 10 5*   HEMATOCRIT % 35 5*   MCV fL 77*   MCH pg 22 7*   MCHC g/dL 29 6*   RDW % 19 5*   MPV fL 9 7   PLATELETS Thousands/uL 306     CMP:   Results from last 7 days   Lab Units 09/06/22  0436 09/05/22  0815   SODIUM mmol/L 137 136   CHLORIDE mmol/L 98 97   CO2 mmol/L 24 28   BUN mg/dL 31* 22   CREATININE mg/dL 1 35* 0 84   CALCIUM mg/dL 9 0 9 9   AST U/L  -- 29   ALT U/L  --  41   ALK PHOS U/L  --  258*   EGFR ml/min/1 73sq m 58 98     HS Troponin:   0   Lab Value Date/Time    HSTNI0 23 09/05/2022 0815    HSTNI2 23 09/05/2022 1013    HSTNI4 15 08/06/2022 1728     BNP:   Results from last 7 days   Lab Units 09/06/22  0436   POTASSIUM mmol/L 3 1*   CHLORIDE mmol/L 98   CO2 mmol/L 24   BUN mg/dL 31*   CREATININE mg/dL 1 35*   CALCIUM mg/dL 9 0   EGFR ml/min/1 73sq m 58     Coags:   Results from last 7 days   Lab Units 09/06/22  0436   INR  1 96*     TSH:     Magnesium:   Results from last 7 days   Lab Units 09/06/22  0436   MAGNESIUM mg/dL 2 0     Lipid Profile:     Imaging: I have personally reviewed pertinent reports      EKG: Normal sinus rhythm  Possible Left atrial enlargement  Rightward axis  Septal infarct (cited on or before 22-AUG-2022)  Nonspecific ST abnormality  Prolonged QT  Abnormal ECG  When compared with ECG of 22-AUG-2022 03:37,  Premature ventricular complexes are no longer Present  T wave inversion more evident in Inferior leads  Confirmed by Anni Almazan ([id number]) on 9/5/2022 10:19:14 PM

## 2022-09-06 NOTE — PROCEDURES
POC Cardiac US    Date/Time: 9/6/2022 4:24 PM  Performed by: Ricky Zavaleta MD  Authorized by: Ricky Zavaleta MD     Patient location:  ICU  Procedure details:     Exam Type:  Diagnostic    Indications comment:  Acute onset chest pain and respiratory distress    Assessment / Evaluation for comment:  Lung sliding    Exam Type: follow-up exam      Image quality: limited diagnostic      Image availability:  Not obtained due to urgency  Patient Details:     Cardiac Rhythm:  Regular    Mechanical ventilation: No    Cardiac findings:     Echo technique: limited 2D    Pulmonary findings:     Left Lung Findings: left lung sliding      Right lung findings: right lung sliding    Interpretation:      Lung sliding seen bilaterally in parasternal lung fields as well as apices  No sonographic evidence of PTX

## 2022-09-06 NOTE — ASSESSMENT & PLAN NOTE
· Chronic in nature since stent placed in April  · Current tobacco smoker-notes he quit last night, on chart review has multiple quit dates  · Cessation encouraged  · Follow-up with pulmonology for formal PFTs  · Worsened in setting of pulmonary vascular congestion and effusion

## 2022-09-06 NOTE — PROGRESS NOTES
[address]  Progress Note - Susi Stair 1965, 56 y o  male MRN: [id number]  Unit/Bed#: -01 Encounter: [id number]  Primary Care Provider: Kiet Hickman DO   Date and time admitted to hospital: 9/5/2022  8:01 AM    * Acute on chronic systolic CHF (congestive heart failure) (HCC)  Assessment & Plan  Wt Readings from Last 3 Encounters:   09/06/22 75 3 kg (166 lb)   08/22/22 70 6 kg (155 lb 10 3 oz)   08/11/22 65 6 kg (144 lb 9 6 oz)     Presents to ED with shortness of breath and leg swelling   Has history of heart failure with multiple hospitalizations - recently discharged 08/11, Home regimen torsemide 20 mg b i d   o Has been taking as prescribed except recently has been increasing dose as he feels it was not working, last 2 days prior to admission has not taken any doses as he feels that has not been working  [facility] ED diuresed with 80 mg IV Lasix     CXR pending official read, pulmonary congestion and pleural effusion   BNP 3,120   EKG normal sinus rhythm with left atrial enlargement, rate 99, QTC of prolonged   Monitor on tele due to concurrent electrolyte abnormalities   ECHO April of 2022 revealed EF 25%, severe apical akinesis  o Was due for outpatient ECHO today, will order as inpatient due to acute CHF   o EF now 10-15% - cardiology having discussion of ICD with patient      Continue diuresis with Lasix drip    Continue to monitor electrolytes and replete as needed   Daily weights   Strict I & Os   Cardiac diet, low sodium <2g, fluid restriction <1500   Cardiology consult due to readmission, recommendation appreciated    LOREE (acute kidney injury) ([facility] [address] )  Assessment & Plan  Baseline 0 8-1  Elevated today to 1 35 likely in setting of diuresis   Need diuresis so will monitor closely with switching to lasix drip     Hypokalemia  Assessment & Plan  · 2 7 on admission - supplemented with 40 IV and 40 oral on admission  · Continue supplementation  · Recheck this AM is 3 1 - continue 40 mg BID oral     Tobacco abuse  Assessment & Plan  · Patient notes he quit yesterday  · Encourage continued cessation-patient has multiple quit dates  · Pulmonary follow-up for formal PFTs due to chronic nature of shortness of breath with activity    Pleural effusion, right  Assessment & Plan  · CTA PE study showing pulmonary edema with large right pleural effusion with subjacent compressive atelectasis  · Continue IV diuresis with Lasix drip   · Critical care consulted for possible thoracentesis  · holding Coumadin given vitamin K for reversal  · Critical care performing today      Chronic anticoagulation  Assessment & Plan  · Started on Coumadin to prevent thrombosis due to apical akinesis after recent cardiac catheterization  · INR is 2 3  · Critical care evaluation for thoracentesis  · Goal INR preprocedure is 1 8-give 1 dose of IV vitamin K 5 mg and hold Coumadin  · 1 9 this AM     PAD (peripheral artery disease) (HCC)  Assessment & Plan  Underwent aorto-bifemoral bypass artery and reports chronic leg pains but this is unchanged       Dyspnea on exertion  Assessment & Plan  · Chronic in nature since stent placed in April  · Current tobacco smoker-notes he quit last night, on chart review has multiple quit dates  · Cessation encouraged  · Follow-up with pulmonology for formal PFTs  · Worsened in setting of pulmonary vascular congestion and effusion       VTE Pharmacologic Prophylaxis: VTE Score: 3 Moderate Risk (Score 3-4) - Pharmacological DVT Prophylaxis Contraindicated  Sequential Compression Devices Ordered  Patient Centered Rounds: I performed bedside rounds with nursing staff today  Discussions with Specialists or Other Care Team Provider: CM, Cardiology, critical care     Education and Discussions with Family / Patient: Patient declined call to   Time Spent for Care: 30 minutes   More than 50% of total time spent on counseling and coordination of care as described above  Current Length of Stay: 1 day(s)  Current Patient Status: Inpatient   Certification Statement: The patient will continue to require additional inpatient hospital stay due to CHF exacerbation   Discharge Plan: Anticipate discharge in >72 hrs to home  Code Status: Level 1 - Full Code    Subjective:   Seen and examined  Incredibly anxious this morning states "I just had a panic attack" state she was up most of the night and was sitting on the edge of the bed because sitting with his legs elevation gives him anxiety  Discussed plan for today and importantance of leg elevation  Objective:     Vitals:   Temp (24hrs), Av °F (36 7 °C), Min:97 5 °F (36 4 °C), Max:98 8 °F (37 1 °C)    Temp:  [97 5 °F (36 4 °C)-98 8 °F (37 1 °C)] 97 5 °F (36 4 °C)  HR:  [] 99  Resp:  [18-26] 18  BP: (104-129)/(56-94) 123/94  SpO2:  [79 %-98 %] 89 %  Body mass index is 27 62 kg/m²  Input and Output Summary (last 24 hours): Intake/Output Summary (Last 24 hours) at 2022 1201  Last data filed at 2022 0847  Gross per 24 hour   Intake 1210 ml   Output 3175 ml   Net -1965 ml       Physical Exam:   Physical Exam  Vitals and nursing note reviewed  Constitutional:       General: He is not in acute distress  Appearance: Normal appearance  HENT:      Head: Normocephalic and atraumatic  Nose: No congestion  Mouth/Throat:      Mouth: Mucous membranes are moist    Eyes:      Conjunctiva/sclera: Conjunctivae normal    Cardiovascular:      Rate and Rhythm: Normal rate and regular rhythm  Pulses: Normal pulses  Heart sounds: Normal heart sounds  No murmur heard  Pulmonary:      Effort: Pulmonary effort is normal  No respiratory distress  Comments: Crackles bilaterally, decreased more on right side   Abdominal:      General: Bowel sounds are normal       Palpations: Abdomen is soft  Tenderness: There is no abdominal tenderness     Musculoskeletal:         General: Normal range of motion  Right lower leg: Edema (+3 pitting) present  Left lower leg: Edema (+3 pitting) present  Skin:     General: Skin is warm and dry  Neurological:      Mental Status: He is alert and oriented to person, place, and time  Additional Data:     Labs:  Results from last 7 days   Lab Units 09/06/22  0436 09/05/22  0815   WBC Thousand/uL 6 92 6 37   HEMOGLOBIN g/dL 10 5* 11 1*   HEMATOCRIT % 35 5* 38 6   PLATELETS Thousands/uL 306 293   NEUTROS PCT %  --  64   LYMPHS PCT %  --  25   MONOS PCT %  --  8   EOS PCT %  --  1     Results from last 7 days   Lab Units 09/06/22  0436 09/05/22  0815   SODIUM mmol/L 137 136   POTASSIUM mmol/L 3 1* 2 7*   CHLORIDE mmol/L 98 97   CO2 mmol/L 24 28   BUN mg/dL 31* 22   CREATININE mg/dL 1 35* 0 84   ANION GAP mmol/L 15* 11   CALCIUM mg/dL 9 0 9 9   ALBUMIN g/dL  --  3 2*   TOTAL BILIRUBIN mg/dL  --  1 06*   ALK PHOS U/L  --  258*   ALT U/L  --  41   AST U/L  --  29   GLUCOSE RANDOM mg/dL 90 106     Results from last 7 days   Lab Units 09/06/22  0436   INR  1 96*                   Lines/Drains:  Invasive Devices  Report    Peripheral Intravenous Line  Duration           Peripheral IV 09/05/22 Left Antecubital 1 day                  Telemetry:  Telemetry Orders (From admission, onward)             48 Hour Telemetry Monitoring  Continuous x 48 hours        References:    Telemetry Guidelines   Question:  Reason for 48 Hour Telemetry  Answer:  Acute Decompensated CHF (continuous diuretic infusion or total diuretic dose > 200 mg daily, associated electrolyte derangement, ionotropic drip, history of ventricular arrhythmia, or new EF <35%)                 Telemetry Reviewed: Normal Sinus Rhythm and PVCs  Indication for Continued Telemetry Use: Acute CHF on >200 mg lasix/day or equivalent dose or with new reduced EF                Imaging: Reviewed radiology reports from this admission including: chest xray, chest CT scan and ultrasound(s)    Recent Cultures (last 7 days):         Last 24 Hours Medication List:   Current Facility-Administered Medications   Medication Dose Route Frequency Provider Last Rate    acetaminophen  650 mg Oral Q4H PRN Ramone Llamas PA-C      acetaminophen  975 mg Oral Q6H PRN Ramone Llamas PA-C      atorvastatin  80 mg Oral Daily Ramone Llamas PA-C      docusate sodium  100 mg Oral BID Ramone Llamas PA-C      furosemide  10 mg/hr Intravenous Continuous [place], [place] 10 mg/hr (09/06/22 1116)    gabapentin  600 mg Oral Daily PRN Ramone Llamas PA-C      hydrOXYzine HCL  25 mg Oral Q6H PRN Ramone Llamas PA-C      lidocaine (PF)  10 mL Infiltration Once BARBIE Hernández      melatonin  6 mg Oral HS Ramone Llamas PA-C      metoprolol succinate  25 mg Oral Daily Ramone Llamas PA-C      nicotine  1 patch Transdermal Daily Ramone Llamas PA-C      ondansetron  4 mg Intravenous Q6H PRN Ramone Llamas PA-C      polyethylene glycol  17 g Oral Daily PRN Ramone Llamas PA-C      potassium chloride  40 mEq Oral BID With Meals [place], [place]          Today, Patient Was Seen By: Ramone Llamas PA-C    **Please Note: This note may have been constructed using a voice recognition system  **

## 2022-09-06 NOTE — ASSESSMENT & PLAN NOTE
Wt Readings from Last 3 Encounters:   09/06/22 75 4 kg (166 lb 3 6 oz)   08/22/22 70 6 kg (155 lb 10 3 oz)   08/11/22 65 6 kg (144 lb 9 6 oz)     Presents to ED with shortness of breath and leg swelling   Has history of heart failure with multiple hospitalizations - recently discharged 08/11, Home regimen torsemide 20 mg b i d   o Has been taking as prescribed except recently has been increasing dose as he feels it was not working, last 2 days prior to admission has not taken any doses as he feels that has not been working  [facility] ED diuresed with 80 mg IV Lasix     CXR pending official read, pulmonary congestion and pleural effusion   BNP 3,120   EKG normal sinus rhythm with left atrial enlargement, rate 99, QTC of prolonged   Monitor on tele due to concurrent electrolyte abnormalities   ECHO April of 2022 revealed EF 25%, severe apical akinesis  o Was due for outpatient ECHO today, will order as inpatient due to acute CHF     Continue diuresis with Lasix drip    Continue to monitor electrolytes and replete as needed   Daily weights   Strict I & Os   Cardiac diet, low sodium <2g, fluid restriction <1500   Cardiology consult due to readmission, recommendation appreciated

## 2022-09-06 NOTE — ASSESSMENT & PLAN NOTE
· C/o shortness of breath with CP, tachypnea, increased WOB, tripoding and dusky face  · 2/2 CP versus flash pulmonary edema, PNA with a component of anxiety-Placed on BiPAP-could not tolerate-currently 4L NC  · Stat CXR-no PTHX, mild pulmonary edema  · Bumex 0 5 mg IV x 1 given  · Wean FiO2 as able to maintain SpO2>90%  · VBG 7 275/43 4/19 7   · Pulmonary tolieting

## 2022-09-06 NOTE — PLAN OF CARE
Problem: Potential for Falls  Goal: Patient will remain free of falls  Description: INTERVENTIONS:  - Educate patient/family on patient safety including physical limitations  - Instruct patient to call for assistance with activity   - Consult OT/PT to assist with strengthening/mobility   - Keep Call bell within reach  - Keep bed low and locked with side rails adjusted as appropriate  - Keep care items and personal belongings within reach  - Initiate and maintain comfort rounds  - Make Fall Risk Sign visible to staff  - Offer Toileting every  Hours, in advance of need  - Initiate/Maintain alarm  - Obtain necessary fall risk management equipment:   - Apply yellow socks and bracelet for high fall risk patients  - Consider moving patient to room near nurses station  Outcome: Progressing     Problem: PAIN - ADULT  Goal: Verbalizes/displays adequate comfort level or baseline comfort level  Description: Interventions:  - Encourage patient to monitor pain and request assistance  - Assess pain using appropriate pain scale  - Administer analgesics based on type and severity of pain and evaluate response  - Implement non-pharmacological measures as appropriate and evaluate response  - Consider cultural and social influences on pain and pain management  - Notify physician/advanced practitioner if interventions unsuccessful or patient reports new pain  Outcome: Progressing     Problem: INFECTION - ADULT  Goal: Absence or prevention of progression during hospitalization  Description: INTERVENTIONS:  - Assess and monitor for signs and symptoms of infection  - Monitor lab/diagnostic results  - Monitor all insertion sites, i e  indwelling lines, tubes, and drains  - Monitor endotracheal if appropriate and nasal secretions for changes in amount and color  - [place] appropriate cooling/warming therapies per order  - Administer medications as ordered  - Instruct and encourage patient and family to use good hand hygiene technique  - Identify and instruct in appropriate isolation precautions for identified infection/condition  Outcome: Progressing  Goal: Absence of fever/infection during neutropenic period  Description: INTERVENTIONS:  - Monitor WBC    Outcome: Progressing     Problem: SAFETY ADULT  Goal: Patient will remain free of falls  Description: INTERVENTIONS:  - Educate patient/family on patient safety including physical limitations  - Instruct patient to call for assistance with activity   - Consult OT/PT to assist with strengthening/mobility   - Keep Call bell within reach  - Keep bed low and locked with side rails adjusted as appropriate  - Keep care items and personal belongings within reach  - Initiate and maintain comfort rounds  - Make Fall Risk Sign visible to staff  - Offer Toileting every  Hours, in advance of need  - Initiate/Maintain alarm  - Obtain necessary fall risk management equipment:   - Apply yellow socks and bracelet for high fall risk patients  - Consider moving patient to room near nurses station  Outcome: Progressing  Goal: Maintain or return to baseline ADL function  Description: INTERVENTIONS:  -  Assess patient's ability to carry out ADLs; assess patient's baseline for ADL function and identify physical deficits which impact ability to perform ADLs (bathing, care of mouth/teeth, toileting, grooming, dressing, etc )  - Assess/evaluate cause of self-care deficits   - Assess range of motion  - Assess patient's mobility; develop plan if impaired  - Assess patient's need for assistive devices and provide as appropriate  - Encourage maximum independence but intervene and supervise when necessary  - Involve family in performance of ADLs  - Assess for home care needs following discharge   - Consider OT consult to assist with ADL evaluation and planning for discharge  - Provide patient education as appropriate  Outcome: Progressing  Goal: Maintains/Returns to pre admission functional level  Description: INTERVENTIONS:  - Perform BMAT or MOVE assessment daily    - Set and communicate daily mobility goal to care team and patient/family/caregiver  - Collaborate with rehabilitation services on mobility goals if consulted  - Perform Range of Motion  times a day  - Reposition patient every  hours  - Dangle patient  times a day  - Stand patient  times a day  - Ambulate patient  times a day  - Out of bed to chair  times a day   - Out of bed for meals  times a day  - Out of bed for toileting  - Record patient progress and toleration of activity level   Outcome: Progressing     Problem: DISCHARGE PLANNING  Goal: Discharge to home or other facility with appropriate resources  Description: INTERVENTIONS:  - Identify barriers to discharge w/patient and caregiver  - Arrange for needed discharge resources and transportation as appropriate  - Identify discharge learning needs (meds, wound care, etc )  - Arrange for interpretive services to assist at discharge as needed  - Refer to Case Management Department for coordinating discharge planning if the patient needs post-hospital services based on physician/advanced practitioner order or complex needs related to functional status, cognitive ability, or social support system  Outcome: Progressing     Problem: Knowledge Deficit  Goal: Patient/family/caregiver demonstrates understanding of disease process, treatment plan, medications, and discharge instructions  Description: Complete learning assessment and assess knowledge base    Interventions:  - Provide teaching at level of understanding  - Provide teaching via preferred learning methods  Outcome: Progressing     Problem: CARDIOVASCULAR - ADULT  Goal: Maintains optimal cardiac output and hemodynamic stability  Description: INTERVENTIONS:  - Monitor I/O, vital signs and rhythm  - Monitor for S/S and trends of decreased cardiac output  - Administer and titrate ordered vasoactive medications to optimize hemodynamic stability  - Assess quality of pulses, skin color and temperature  - Assess for signs of decreased coronary artery perfusion  - Instruct patient to report change in severity of symptoms  Outcome: Progressing  Goal: Absence of cardiac dysrhythmias or at baseline rhythm  Description: INTERVENTIONS:  - Continuous cardiac monitoring, vital signs, obtain 12 lead EKG if ordered  - Administer antiarrhythmic and heart rate control medications as ordered  - Monitor electrolytes and administer replacement therapy as ordered  Outcome: Progressing     Problem: HEMATOLOGIC - ADULT  Goal: Maintains hematologic stability  Description: INTERVENTIONS  - Assess for signs and symptoms of bleeding or hemorrhage  - Monitor labs  - Administer supportive blood products/factors as ordered and appropriate  Outcome: Progressing

## 2022-09-06 NOTE — ASSESSMENT & PLAN NOTE
· On chronic Coumadin to prevent thrombosis due to apical akinesis after recent cardiac catheterization  · INR on admit  2 3  · Received 1 dose of vitamin K 5 mg IV yesterday prior to having right thoracentesis 9/6  · Consider restarting home dose of 2 5 if CT scan is negative tonight

## 2022-09-06 NOTE — ASSESSMENT & PLAN NOTE
· CTA PE study showing pulmonary edema with large right pleural effusion with subjacent compressive atelectasis  · Continue IV diuresis with Lasix drip   · Status post rt thoracentesis for 1 2 L 9/6

## 2022-09-06 NOTE — ASSESSMENT & PLAN NOTE
· Patient notes he quit yesterday  · Encourage continued cessation-patient has multiple quit dates  · Pulmonary follow-up for formal PFTs due to chronic nature of shortness of breath with activity

## 2022-09-06 NOTE — CONSULTS
Consult received for CHF ed  Pt previously NPO for thoracentesis which was completed today  Now advanced to cardiac, 2gm Na with 1 5 L fluid restriction  Attempted diet instruction though pt having breathing difficulties at this time, RN along with PA and CC tending to pt at this time  Diet instruction not appropriate at this time, will provide diet instruction once appropriate  Continue diet as ordered  Consider supplements at follow up pending further meal completions

## 2022-09-06 NOTE — PROGRESS NOTES
Vancomycin Assessment    Abhilash Edmonds is a 56 y o  male who is currently receiving vancomycin for Pneumonia  Relevant clinical data and objective history reviewed:  Creatinine   Date Value Ref Range Status   09/06/2022 1 62 (H) 0 60 - 1 30 mg/dL Final     Comment:     Standardized to IDMS reference method   09/06/2022 1 35 (H) 0 60 - 1 30 mg/dL Final     Comment:     Standardized to IDMS reference method   09/05/2022 0 84 0 60 - 1 30 mg/dL Final     Comment:     Standardized to IDMS reference method     /68   Pulse (!) 113   Temp (!) 102 74 °F (39 3 °C)   Resp (!) 23   Ht 5' 5" (1 651 m)   Wt 75 3 kg (166 lb)   SpO2 92%   BMI 27 62 kg/m²   I/O last 3 completed shifts: In: 730 [P O :480; IV Piggyback:250]  Out: 2225 [Urine:2225]  Lab Results   Component Value Date/Time    BUN 40 (H) 09/06/2022 04:23 PM    WBC 8 38 09/06/2022 04:23 PM    HGB 11 7 (L) 09/06/2022 04:23 PM    HCT 40 9 09/06/2022 04:23 PM    MCV 78 (L) 09/06/2022 04:23 PM     09/06/2022 04:23 PM     Temp Readings from Last 3 Encounters:   09/06/22 (!) 102 74 °F (39 3 °C)   08/22/22 (!) 97 °F (36 1 °C) (Temporal)   08/11/22 98 1 °F (36 7 °C)     Vancomycin Days of Therapy: 1    Assessment/Plan  The patient is currently on vancomycin utilizing scheduled dosing based on actual body weight  Baseline risks associated with therapy include: pre-existing renal impairment and concomitant nephrotoxic medications  After clinical evaluation the patient will begin vancomycin 750 mg IV q 12 hrs  Pharmacy will also follow closely for s/sx of nephrotoxicity, infusion reactions, and appropriateness of therapy  BMP and CBC will be ordered per protocol  Plan for trough as patient approaches steady state, prior to the 5th  dose at approximately 1730 on 9/8/22  Due to infection severity, will target a trough of 15-20 (appropriate for most indications)     Pharmacy will continue to follow the patients culture results and clinical progress daily      Veronica Caballero, Pharmacist

## 2022-09-06 NOTE — ASSESSMENT & PLAN NOTE
· CTA PE study showing pulmonary edema with large right pleural effusion with subjacent compressive atelectasis  · Continue IV diuresis with Lasix drip   · Critical care consulted for possible thoracentesis  · holding Coumadin given vitamin K for reversal for reversal  · Plan for potential tomorrow

## 2022-09-06 NOTE — ASSESSMENT & PLAN NOTE
· Patient met sepsis criteria with hyperthermia, tachycardia, tachypnea, and lactic acidosis of 9 3  · Patient not resuscitated with 30 ml/kg per protocol secondary to heart failure, new EF of 10%, and being diuresed  · Will trend lactic  · Blood cultures x2, UA, urine culture pending  · Suspect PNA  · CXR-pulmonary edema  · Procal pending  · White blood cell count of 10, without any bands  · Repeat CT chest abd/pelvis -pending results  · Cefepime/Vanco started-if procal neg x 2 dc  · Trend WBC and fever curve

## 2022-09-06 NOTE — RESPIRATORY THERAPY NOTE
RT Protocol Note  Tyra Iqbal 56 y o  male MRN: [id number]  Unit/Bed#: -01 Encounter: [id number]    Assessment    Principal Problem:    Acute on chronic systolic CHF (congestive heart failure) ([facility] )  Active Problems:    Dyspnea on exertion    PAD (peripheral artery disease) ([facility])    Chronic anticoagulation    Pleural effusion, right    Tobacco abuse    Hypokalemia    LOREE (acute kidney injury) ([facility] )    Chest pain      Home Pulmonary Medications:         Past Medical History:   Diagnosis Date    CHF (congestive heart failure) ([facility])     Coronary artery disease     COVID     MI, old      Social History     Socioeconomic History    Marital status: Single     Spouse name: None    Number of children: None    Years of education: None    Highest education level: None   Occupational History    None   Tobacco Use    Smoking status: Current Every Day Smoker     Packs/day: 0 25     Types: Cigarettes    Smokeless tobacco: Never Used   Vaping Use    Vaping Use: Never used   Substance and Sexual Activity    Alcohol use: Yes    Drug use: Not Currently    Sexual activity: None   Other Topics Concern    None   Social History Narrative    None     Social Determinants of Health     Financial Resource Strain: Not on file   Food Insecurity: No Food Insecurity    Worried About Running Out of Food in the Last Year: Never true    Ran Out of Food in the Last Year: Never true   Transportation Needs: No Transportation Needs    Lack of Transportation (Medical): No    Lack of Transportation (Non-Medical):  No   Physical Activity: Not on file   Stress: Not on file   Social Connections: Not on file   Intimate Partner Violence: Not on file   Housing Stability: Low Risk     Unable to Pay for Housing in the Last Year: No    Number of Places Lived in the Last Year: 1    Unstable Housing in the Last Year: No       Subjective         Objective    Physical Exam:   Assessment Type: Assess only  General Appearance: Alert, Awake  Respiratory Pattern: Dyspnea with exertion, Dyspnea at rest  Chest Assessment: Chest expansion symmetrical  Bilateral Breath Sounds: Diminished, Clear  O2 Device: 2L    Vitals:  Blood pressure 135/80, pulse (!) 111, temperature 98 6 °F (37 °C), temperature source Temporal, resp  rate (!) 36, height 5' 5" (1 651 m), weight 75 3 kg (166 lb), SpO2 100 %  Imaging and other studies: I have personally reviewed pertinent reports  O2 Device: 2L     Plan    Respiratory Plan: Mild Distress pathway        Resp Comments: (P) PT w/ CHF admitted W/ increase leg swelling   Pt states he quit smoking 3 days ago , denies any home O2 use or use of resp meds, Is on 2L, clear BS bilaterally   Complaining of right sided chest pain  Unable to get ABG VBG was ordered   Bipap ordered transferred to [facility]   Pt could not tolerate mask ,on 5LNC

## 2022-09-06 NOTE — ASSESSMENT & PLAN NOTE
Wt Readings from Last 3 Encounters:   09/06/22 75 3 kg (166 lb)   08/22/22 70 6 kg (155 lb 10 3 oz)   08/11/22 65 6 kg (144 lb 9 6 oz)     Presents to ED with shortness of breath and leg swelling   Has history of heart failure with multiple hospitalizations - recently discharged 08/11, Home regimen torsemide 20 mg b i d   o Has been taking as prescribed except recently has been increasing dose as he feels it was not working, last 2 days prior to admission has not taken any doses as he feels that has not been working  [employer] ED diuresed with 80 mg IV Lasix     CXR pending official read, pulmonary congestion and pleural effusion   BNP 3,120   EKG normal sinus rhythm with left atrial enlargement, rate 99, QTC of prolonged   Monitor on tele due to concurrent electrolyte abnormalities   ECHO April of 2022 revealed EF 25%, severe apical akinesis   o EF now 10-15% - cardiology having discussion of ICD with patient      Continue diuresis with Lasix drip 10 mg/hr   Continue to monitor electrolytes and replete as needed   Daily weights  Previously patient was discharged with a weight of 144 lb    Still 10 lb above that weight   Strict I & Os   Cardiac diet, low sodium <2g, fluid restriction <1500   Cardiology consult due to readmission, recommendation appreciated

## 2022-09-06 NOTE — SEPSIS NOTE
Sepsis Note   Darylene Leatherwood 56 y o  male MRN: [id number]  Unit/Bed#: -01 Encounter: [id number]       qSOFA     Row Name 09/06/22 1628 09/06/22 1528 09/06/22 15:05:07 09/06/22 14:17:59 09/06/22 1230    Altered mental status GCS < 15 -- -- -- -- --    Respiratory Rate > / =22 1 0 -- -- --    Systolic BP < / =657 0 -- 0 0 1    Q Sofa Score 1 0 -- 0 1    Row Name 09/06/22 1215 09/06/22 1200 09/06/22 1145 09/06/22 1115 09/06/22 1102    Altered mental status GCS < 15 -- -- -- -- --    Respiratory Rate > / =22 -- -- -- -- 0    Systolic BP < / =112 0 0 0 0 0    Q Sofa Score 0 0 0 0 0    Row Name 09/06/22 1030 09/06/22 07:30:43 09/06/22 03:27:49 09/05/22 2259 09/05/22 1900    Altered mental status GCS < 15 -- -- -- -- --    Respiratory Rate > / =22 -- 0 1 0 1    Systolic BP < / =161 0 0 0 0 0    Q Sofa Score 0 0 1 0 1    Row Name 09/05/22 15:27:27 09/05/22 13:51:52 09/05/22 1315 09/05/22 1245 09/05/22 1200    Altered mental status GCS < 15 -- -- -- -- --    Respiratory Rate > / =22 1 0 1 1 1    Systolic BP < / =693 0 0 0 0 0    Q Sofa Score 1 0 1 1 1    Row Name 09/05/22 1145 09/05/22 1115 09/05/22 1100 09/05/22 1030 09/05/22 1015    Altered mental status GCS < 15 -- -- -- -- --    Respiratory Rate > / =22 0 1 0 1 1    Systolic BP < / =607 0 0 1 1 1    Q Sofa Score 0 1 1 2 2    Row Name 09/05/22 0915 09/05/22 0900 09/05/22 0830 09/05/22 0815 09/05/22 0805    Altered mental status GCS < 15 -- -- -- -- --    Respiratory Rate > / =22 0 1 1 1 1    Systolic BP < / =396 0 0 0 0 0    Q Sofa Score 0 1 1 1 1               Initial Sepsis Screening     Row Name 09/06/22 1259                Is the patient's history suggestive of a new or worsening infection?  Yes (Proceed)  unknown  -SS        Suspected source of infection suspect infection, source unknown  -SS        Are two or more of the following signs & symptoms of infection both present and new to the patient? --        Indicate SIRS criteria Hyperthemia > 38 3C (100  9F); Tachycardia > 90 bpm;Tachypnea > 20 resp per min  -        If the answer is yes to both questions, suspicion of sepsis is present --        If severe sepsis is present AND tissue hypoperfusion perists in the hour after fluid resuscitation or lactate > 4, the patient meets criteria for SEPTIC SHOCK --        Are any of the following organ dysfunction criteria present within 6 hours of suspected infection and SIRS criteria that are NOT considered to be chronic conditions? Yes  -        Organ dysfunction Lactate > 2 0 mmol/L  -        Date of presentation of severe sepsis 09/06/22  -        Time of presentation of severe sepsis 1707  -        Tissue hypoperfusion persists in the hour after crystalloid fluid administration, evidenced, by either: --        Was hypotension present within one hour of the conclusion of crystalloid fluid administration? --        Date of presentation of septic shock --        Time of presentation of septic shock --              User Key  (r) = Recorded By, (t) = Taken By, (c) = Cosigned By    [id number] Name Provider Type     Nenita Grant Nurse Practitioner                   The 30ml/kg fluid bolus was not given to the patient despite hypotension and/or significantly elevated lactate of ? 4 and/or presence of septic shock due to: Heart Failure  The patient will be administered no IVF 2/2 to low ef and we are diuresing him of crystalloid fluid instead  Orders for this have been placed in Epic  The patient may receive additional colloid or crystalloid fluids thereafter based on clinical condition       [id number] [facility] Fiona Vigil

## 2022-09-06 NOTE — ASSESSMENT & PLAN NOTE
· S/p PCI of LAD with stent on 4/10/2022  · Continue Plavix  · Cardiology consulted  · Trend troponin

## 2022-09-06 NOTE — ASSESSMENT & PLAN NOTE
· C/o right severe right sided chest pain 9/10-radiates to abdomen   · Suspect more pleuritic from lung reexpansion s/p right thoracentesis  · EKG without acute ischemic changes  · Stat CT chest/abdomen and pelvis-pending results  · Troponins pending  · Dilaudid 0 1 mg Iv prn severe pain and oxycodone

## 2022-09-06 NOTE — ASSESSMENT & PLAN NOTE
· Started on Coumadin to prevent thrombosis due to apical akinesis after recent cardiac catheterization  · INR is 2 3  · Critical care evaluation for thoracentesis  · Goal INR preprocedure is 1 8-give 1 dose of IV vitamin K 5 mg and hold Coumadin  · 1 9 this AM

## 2022-09-06 NOTE — UTILIZATION REVIEW
Initial Clinical Review    Admission: Date/Time/Statement:   Admission Orders (From admission, onward)     Ordered        09/05/22 1248  INPATIENT ADMISSION  Once                      Orders Placed This Encounter   Procedures    INPATIENT ADMISSION     Standing Status:   Standing     Number of Occurrences:   1     Order Specific Question:   Level of Care     Answer:   Med Surg [16]     Order Specific Question:   Estimated length of stay     Answer:   More than 2 Midnights     Order Specific Question:   Certification     Answer:   I certify that inpatient services are medically necessary for this patient for a duration of greater than two midnights  See H&P and MD Progress Notes for additional information about the patient's course of treatment  ED Arrival Information     Expected   -    Arrival   9/5/2022 07:57    Acuity   Urgent            Means of arrival   Wheelchair    Escorted by   [facility] Member    Service   Anesthesiology    Admission type   Urgent            Arrival complaint   SOB, legs swelling           Chief Complaint   Patient presents with    Leg Swelling     Pt reports lower leg edema over the past few days, told to increased torsemide dose to 60mg BID, states he did that for 2 days and did not help  Initial Presentation: 56 y o  male to ED presents shortness of breath and leg swelling  Recently discharged on 08/11 due to CHF exacerbation  Was started on torsemide 20 mg bid  He was doing well for a few weeks however, noticed  torsemide seem to not be working to full effectiveness is start taking increased doses in the afternoon  The last 2 days noticed it seems to not be working much at all and did not take any dose of torsemide  Notes he eats a lot of fruits and vegetables, try stay away from [facility]  States he quit smoking 6 hours prior to presentation to the ER  Has been having continued dyspnea on exertion, unable to make it up 7 flights of stairs without needing to take a break   PMH for systolic CHF, PAD, chronic anticoagulation, tobacco abuse,CAD with recent stenting  Admit Inpatient level of care for Acute on chronic systolic CHF, Dyspnea on exertion, Hypokalemia and Right pleural effusion  EKG  normal sinus rhythm with left atrial enlargement, rate 99, QTC of prolonged  Tele monitoring  Continue diuresis with Iv Lasix 60 mg bid  Daily wts  Cardiology consult  Low Na diet, fluid restriction <1500  K 2 7 on admit  Recheck  Hold coumadin given Vit k for reversal  On exam; Crackles over bilateral lung, worse on right  Edema (up to mid thigh, +3 pitting) to BLE  CTA PE study showing pulmonary edema with large right pleural effusion with subjacent compressive atelectasis    Wt Readings from Last 3 Encounters:   09/05/22 73 kg (160 lb 15 oz)   08/22/22 70 6 kg (155 lb 10 3 oz)   08/11/22 65 6 kg (144 lb 9 6 oz)        Date: 9/6   Day 2:  Critical Care level of care   Progress notes; EF now 10-15% - cardiology having discussion of ICD with patient  LOREE; Creat elevated to 1 35 today, baseline 0 8-1  Need diuresis so will monitor creat closely with switching to Iv Lasix Drip  On exam, continues with crackles bilaterally, decreased more on right side  +3 pitting edema to BLE  Cardiology cons; Known history of ischemic cardiomyopathy  Patient is status post ST-elevation myocardial infarction April 2022, having PCI to the LAD and circumflex  Reduced LV systolic function, previously ejection fraction range of 25-29% by echocardiogram performed April 2022  Plan for Thoracentesis today or tomorrow  Coumadin on hold for thoracentesis  Stop Iv Lasix boluses  Started on Iv Lasix drip  Kdur 40 meq po bid  Echo pending but at bedside LVEF appears to be lower, now 10%  Consider life vest given persistently low LVEF  Monitor I&O  On exam; Decreased breath sounds in R lung diffusely  9/6 S/p Thoracentesis; 1 2 L fluid removed  Drainage alireza and clear       Wt Readings from Last 3 Encounters:   09/06/22 75 3 kg (166 lb)   08/22/22 70 6 kg (155 lb 10 3 oz)   08/11/22 65 6 kg (144 lb 9 6 oz)       ED Triage Vitals [09/05/22 0805]   Temperature Pulse Respirations Blood Pressure SpO2   98 1 °F (36 7 °C) (!) 106 22 122/79 99 %      Temp Source Heart Rate Source Patient Position - Orthostatic VS BP Location FiO2 (%)   Temporal Monitor Sitting Right arm --      Pain Score       8          Wt Readings from Last 1 Encounters:   09/06/22 75 3 kg (166 lb)     Additional Vital Signs:   09/06/22 1528 -- 108 Abnormal  18 -- -- 99 % 28 2 L/min Nasal cannula --   09/06/22 15:05:07 -- 29 Abnormal  -- 122/82 95 89 % Abnormal  -- -- --      09/06/22 1115 -- 98 -- 123/94 104 90 % -- -- -- --   09/06/22 1102 -- 99 18 123/94 104 89 % Abnormal            09/06/22 07:30:43 97 5 °F (36 4 °C) 100 19 129/90 103 95 % -- -- -- --   09/06/22 0328 -- -- -- -- -- 97 % 24 1 L/min Nasal cannula --   09/06/22 03:27:49 97 8 °F (36 6 °C) 97 22 111/81 91 79 % Abnormal   -- -- -- --   SpO2: inaccurate reading, readjusted pulse ox at 09/06/22 0327   09/05/22 2259 98 8 °F (37 1 °C) 88 20 112/80 -- 96 % -- -- None (Room air) --   09/05/22 2017 -- -- -- -- -- -- -- -- None (Room air) --   09/05/22 1900 98 3 °F (36 8 °C) 89 22 120/89 -- 98 % -- -- -- --   09/05/22 15:27:27 -- 95 22 112/82 92 98 % -- -- -- --   09/05/22 1353 -- -- -- -- -- -- -- -- None (Room air)        Pertinent Labs/Diagnostic Test Results:   XR chest portable ICU   Final Result by Lobo Thompson MD (09/06 1702)         1  Improving small right pleural effusion  No definite pneumothorax  2   Patchy right basilar opacity which may represent infection/inflammation  If clinical signs of infection, consider treatment and recommend follow-up until radiographic clearing to exclude another process  3   Cardiomegaly and mild pulmonary edema  VAS lower limb venous duplex study, complete bilateral - No evidence of acute or chronic deep vein thrombosis    No evidence of superficial thrombophlebitis noted   Final Result by Juani Campos MD (13/01 6527)      CTA ED chest PE Study   Final Result by Kash Lewis MD (09/05 1018)      No evidence for pulmonary embolism  Pulmonary edema  Large right pleural effusion with subjacent compressive atelectasis  XR chest 1 view portable   Final Result by Michaela Reddy MD (09/06 0043)      Right basilar effusion and consolidation              CT chest abdomen pelvis wo contrast  (9/6) - pending         Results from last 7 days   Lab Units 09/06/22  1623 09/06/22  0436 09/05/22  0815   WBC Thousand/uL 8 38 6 92 6 37   HEMOGLOBIN g/dL 11 7* 10 5* 11 1*   HEMATOCRIT % 40 9 35 5* 38 6   PLATELETS Thousands/uL 317 306 293   NEUTROS ABS Thousands/µL  --   --  4 11         Results from last 7 days   Lab Units 09/06/22  0436 09/05/22  0815   SODIUM mmol/L 137 136   POTASSIUM mmol/L 3 1* 2 7*   CHLORIDE mmol/L 98 97   CO2 mmol/L 24 28   ANION GAP mmol/L 15* 11   BUN mg/dL 31* 22   CREATININE mg/dL 1 35* 0 84   EGFR ml/min/1 73sq m 58 98   CALCIUM mg/dL 9 0 9 9   MAGNESIUM mg/dL 2 0 2 1   PHOSPHORUS mg/dL  --  4 6*     Results from last 7 days   Lab Units 09/05/22  0815   AST U/L 29   ALT U/L 41   ALK PHOS U/L 258*   TOTAL PROTEIN g/dL 6 9   ALBUMIN g/dL 3 2*   TOTAL BILIRUBIN mg/dL 1 06*         Results from last 7 days   Lab Units 09/06/22  0436 09/05/22  0815   GLUCOSE RANDOM mg/dL 90 106         Results from last 7 days   Lab Units 09/05/22  1013 09/05/22  0815   HS TNI 0HR ng/L  --  23   HS TNI 2HR ng/L 23  --    HSTNI D2 ng/L 0  --      Results from last 7 days   Lab Units 09/05/22  0815   D-DIMER QUANTITATIVE ug/ml FEU 3 44*     Results from last 7 days   Lab Units 09/06/22  0436 09/05/22  0815   PROTIME seconds 22 5* 25 5*   INR  1 96* 2 31*         Results from last 7 days   Lab Units 09/05/22  0815   NT-PRO BNP pg/mL 3,120*       ED Treatment:   Medication Administration from 09/05/2022 0753 to 09/05/2022 1491 Date/Time Order Dose Route Action     09/05/2022 0852 acetaminophen (TYLENOL) tablet 650 mg 650 mg Oral Given     09/05/2022 1114 potassium chloride (K-DUR,KLOR-CON) CR tablet 40 mEq 40 mEq Oral Given     09/05/2022 0950 iohexol (OMNIPAQUE) 350 MG/ML injection (MULTI-DOSE) 85 mL 85 mL Intravenous Given     09/05/2022 1004 fentanyl citrate (PF) 100 MCG/2ML 25 mcg 25 mcg Intravenous Given     09/05/2022 1121 furosemide (LASIX) injection 80 mg 80 mg Intravenous Given        Past Medical History:   Diagnosis Date    [facility]congestive heart failure) ([facility])     Coronary artery disease     COVID     MI, old      Present on Admission:   Acute on chronic systolic CHF (congestive heart failure) ([facility])   Dyspnea on exertion   Pleural effusion, right   PAD (peripheral artery disease) ([facility])   Tobacco abuse      Admitting Diagnosis: Shortness of breath [R06 02]  Hypokalemia [E87 6]  Leg swelling [M79 89]  Pleural effusion, right [J90]  Acute on chronic systolic CHF (congestive heart failure) ([facility]) [I50 23]  Age/Sex: 56 y o  male     Admission Orders:  Scheduled Medications:  atorvastatin, 80 mg, Oral, Daily  docusate sodium, 100 mg, Oral, BID  melatonin, 6 mg, Oral, HS  metoprolol succinate, 25 mg, Oral, Daily  nicotine, 1 patch, Transdermal, Daily  potassium chloride, 40 mEq, Oral, BID With Meals    furosemide (LASIX) injection 60 mg  Dose: 60 mg  Freq: 2 times daily Route: IV  Start: 09/05/22 1800 End: 09/06/22 1052      Continuous IV Infusions:  furosemide, 10 mg/hr, Intravenous, Continuous      PRN Meds:  acetaminophen, 650 mg, Oral, Q4H PRN  acetaminophen, 975 mg, Oral, Q6H PRN  gabapentin, 600 mg, Oral, Daily PRN  hydrOXYzine HCL, 50 mg, Oral, Q6H PRN  ondansetron, 4 mg, Intravenous, Q6H PRN  oxyCODONE, 5 mg, Oral, Q6H PRN  polyethylene glycol, 17 g, Oral, Daily PRN      ketorolac (TORADOL) injection 15 mg  Dose: 15 mg  Freq: Every 6 hours PRN Route: IV 9/5 x1, 9/6 x1  PRN Reasons: moderate pain,severe pain  Start: 09/05/22 2034 End: 09/06/22 0823      IP CONSULT TO NUTRITION SERVICES  IP CONSULT TO MEDICAL CRITICAL CARE  IP CONSULT TO CARDIOLOGY    Network Utilization Review Department  ATTENTION: Please call with any questions or concerns to [phone] and carefully listen to the prompts so that you are directed to the right person  All voicemails are confidential   Jay Jay Vu all requests for admission clinical reviews, approved or denied determinations and any other requests to dedicated fax number below belonging to the campus where the patient is receiving treatment   List of dedicated fax numbers for the Facilities:  [address] DENIALS (Administrative/Medical Necessity) [phone]   [address] (Maternity/NICU/Pediatrics) [phone] [address]  [address] [address] [place] [address] [address] [facility] [place] [address] [phone]

## 2022-09-06 NOTE — ASSESSMENT & PLAN NOTE
· Started on Coumadin to prevent thrombosis due to apical akinesis after recent cardiac catheterization  · INR is 2

## 2022-09-06 NOTE — ASSESSMENT & PLAN NOTE
Wt Readings from Last 3 Encounters:   09/06/22 75 3 kg (166 lb)   08/22/22 70 6 kg (155 lb 10 3 oz)   08/11/22 65 6 kg (144 lb 9 6 oz)     · Today's Echo LVEF 10-15% down from his previous echo which was 20% consider LifeVest  · CT with large right-sided pleural effusion status post right thoracentesis of 1 2L 9/6  · Cardiology consult appreciate recs  · Continue Lasix drip 10 milligrams/hour  · Received Bumex 0 5 IV-consider diuresis goal of at least -1 L  · Davies for strict I&Os

## 2022-09-06 NOTE — PLAN OF CARE
Problem: Potential for Falls  Goal: Patient will remain free of falls  Description: INTERVENTIONS:  - Educate patient/family on patient safety including physical limitations  - Instruct patient to call for assistance with activity   - Consult OT/PT to assist with strengthening/mobility   - Keep Call bell within reach  - Keep bed low and locked with side rails adjusted as appropriate  - Keep care items and personal belongings within reach  - Initiate and maintain comfort rounds  - Make Fall Risk Sign visible to staff  - Offer Toileting every  Hours, in advance of need  - Initiate/Maintain alarm  - Obtain necessary fall risk management equipment:   - Apply yellow socks and bracelet for high fall risk patients  - Consider moving patient to room near nurses station  Outcome: Progressing     Problem: PAIN - ADULT  Goal: Verbalizes/displays adequate comfort level or baseline comfort level  Description: Interventions:  - Encourage patient to monitor pain and request assistance  - Assess pain using appropriate pain scale  - Administer analgesics based on type and severity of pain and evaluate response  - Implement non-pharmacological measures as appropriate and evaluate response  - Consider cultural and social influences on pain and pain management  - Notify physician/advanced practitioner if interventions unsuccessful or patient reports new pain  Outcome: Progressing     Problem: SAFETY ADULT  Goal: Patient will remain free of falls  Description: INTERVENTIONS:  - Educate patient/family on patient safety including physical limitations  - Instruct patient to call for assistance with activity   - Consult OT/PT to assist with strengthening/mobility   - Keep Call bell within reach  - Keep bed low and locked with side rails adjusted as appropriate  - Keep care items and personal belongings within reach  - Initiate and maintain comfort rounds  - Make Fall Risk Sign visible to staff  - Offer Toileting every  Hours, in advance of need  - Initiate/Maintain alarm  - Obtain necessary fall risk management equipment:   - Apply yellow socks and bracelet for high fall risk patients  - Consider moving patient to room near nurses station  Outcome: Progressing  Goal: Maintain or return to baseline ADL function  Description: INTERVENTIONS:  -  Assess patient's ability to carry out ADLs; assess patient's baseline for ADL function and identify physical deficits which impact ability to perform ADLs (bathing, care of mouth/teeth, toileting, grooming, dressing, etc )  - Assess/evaluate cause of self-care deficits   - Assess range of motion  - Assess patient's mobility; develop plan if impaired  - Assess patient's need for assistive devices and provide as appropriate  - Encourage maximum independence but intervene and supervise when necessary  - Involve family in performance of ADLs  - Assess for home care needs following discharge   - Consider OT consult to assist with ADL evaluation and planning for discharge  - Provide patient education as appropriate  Outcome: Progressing  Goal: Maintains/Returns to pre admission functional level  Description: INTERVENTIONS:  - Perform BMAT or MOVE assessment daily    - Set and communicate daily mobility goal to care team and patient/family/caregiver  - Collaborate with rehabilitation services on mobility goals if consulted  - Perform Range of Motion  times a day  - Reposition patient every  hours    - Dangle patient  times a day  - Stand patient  times a day  - Ambulate patient  times a day  - Out of bed to chair  times a day   - Out of bed for meal times a day  - Out of bed for toileting  - Record patient progress and toleration of activity level   Outcome: Progressing     Problem: CARDIOVASCULAR - ADULT  Goal: Maintains optimal cardiac output and hemodynamic stability  Description: INTERVENTIONS:  - Monitor I/O, vital signs and rhythm  - Monitor for S/S and trends of decreased cardiac output  - Administer and titrate ordered vasoactive medications to optimize hemodynamic stability  - Assess quality of pulses, skin color and temperature  - Assess for signs of decreased coronary artery perfusion  - Instruct patient to report change in severity of symptoms  Outcome: Progressing  Goal: Absence of cardiac dysrhythmias or at baseline rhythm  Description: INTERVENTIONS:  - Continuous cardiac monitoring, vital signs, obtain 12 lead EKG if ordered  - Administer antiarrhythmic and heart rate control medications as ordered  - Monitor electrolytes and administer replacement therapy as ordered  Outcome: Progressing

## 2022-09-06 NOTE — NURSING NOTE
Pt c/o anxiety with difficulty breathing and restless legs, making it difficult to sleep, PA made aware, benadryl and Toradol ordered  Pt is seen sitting at side of bed with legs dangling  RN educated pt to elevate BLE to improve pain and swelling, recommended to sleep in recliner for better comfort  Pt continues to c/o of anxiety and worsening comfort with legs elevated  NP aware  Pt is resting on room air with 97%O2 as rest when awake  Intermittently drop to 86-88%O2 while asleep, 1L nasal cannula applied

## 2022-09-06 NOTE — PROCEDURES
Thoracentesis (Bedside)    Date/Time: 9/6/2022 12:13 PM  Performed by: Pipo Zaragoza MD  Authorized by: Pipo Zaragoza MD     Other Assisting Provider: Yes (comment) (Cielo Persaud)    Consent:     Consent obtained:  Written    Consent given by:  Patient    Risks discussed:  Bleeding, incomplete drainage, nerve damage, infection, pain and pneumothorax    Alternatives discussed:  Delayed treatment and alternative treatment  Universal protocol:     Procedure explained and questions answered to patient or proxy's satisfaction: yes      Relevant documents present and verified: yes      Test results available and properly labeled: yes      Radiology Images displayed and confirmed  If images not available, report reviewed: yes      Required blood products, implants, devices and special equipment available: yes      Site/side marked: yes      Immediately prior to procedure a time out was called: yes      Patient identity confirmed:  Verbally with patient and arm band  Indications:     Procedure Purpose: therapeutic      Indications: pleural effusion    Sedation:     Sedation type: Anxiolysis  Anesthesia (see MAR for exact dosages): Anesthesia method:  Local infiltration    Local anesthetic: 10 mL 1% lidocaine MPF  Procedure details:     Preparation: Patient was prepped and draped in usual sterile fashion      Patient position:  Sitting    Laterality:  Right and unilateral    Location:  Midscapular line    Intercostal space:  8th    Puncture method:  Over-the-needle catheter    Ultrasound guidance: yes      Reason for ultrasound: Identify fluid collection and guide catheter placement        Ultrasound image availability:  Video obtained and images available in PACS    Sterile ultrasound techniques: Sterile gel and sterile probe covers were used      Indwelling catheter placed: no      Number of attempts:  1    Drainage color:  Vickie    Drainage characteristics:  Clear    Fluid removed amount:  1 2 liters  Post-procedure details:     Post-procedure chest x-ray: CXR ordered  Patient tolerance of procedure:   Tolerated well, no immediate complications  Comments:      Ultrasound-guided right thoracentesis, successful on first attempt, patient reporting improvement in SOB immediately after procedure

## 2022-09-06 NOTE — CONSULTS
[address] 1965, 56 y o  male MRN: [id number]  Unit/Bed#: -01 Encounter: [id number]  Primary Care Provider: Kiet Hickman DO   Date and time admitted to hospital: 9/5/2022  8:01 AM    Consults    * Acute on chronic systolic CHF (congestive heart failure) ([place] [address] )  Assessment & Plan  Wt Readings from Last 3 Encounters:   09/06/22 75 3 kg (166 lb)   08/22/22 70 6 kg (155 lb 10 3 oz)   08/11/22 65 6 kg (144 lb 9 6 oz)     · Today's Echo LVEF 10-15% down from his previous echo which was 20% consider LifeVest  · CT with large right-sided pleural effusion status post right thoracentesis of 1 2L 9/6  · Cardiology consult appreciate recs  · Continue Lasix drip 10 milligrams/hour  · Received Bumex 0 5 IV-consider diuresis goal of at least -1 L  · Samson for strict I&Os        CAD (coronary artery disease)  Assessment & Plan  · S/p PCI of LAD with stent on 4/10/2022  · Continue Plavix  · Cardiology consulted  · Trend troponin    Sepsis ([place] [address] )  Assessment & Plan  · Patient met sepsis criteria with hyperthermia, tachycardia, tachypnea, and lactic acidosis of 9 3  · Patient not resuscitated with 30 ml/kg per protocol secondary to heart failure, new EF of 10%, and being diuresed  · Will trend lactic  · Blood cultures x2, UA, urine culture pending  · Suspect PNA  · CXR-pulmonary edema  · Procal pending  · White blood cell count of 10, without any bands  · Repeat CT chest abd/pelvis -pending results  · Cefepime/Vanco started-if procal neg x 2 dc  · Trend WBC and fever curve      Chest pain  Assessment & Plan  · C/o right severe right sided chest pain 9/10-radiates to abdomen   · Suspect more pleuritic from lung reexpansion s/p right thoracentesis  · EKG without acute ischemic changes  · Stat CT chest/abdomen and pelvis-pending results  · Troponins pending  · Dilaudid 0 1 mg Iv prn severe pain and oxycodone     LOREE (acute kidney injury) ([facility] )  Assessment & Plan  · Baseline appears to be 0 8-1  · Now 1 66  · 2/2 to diuresis  · Avoid nephrotoxins  · Trend renal indices    Hypokalemia  Assessment & Plan  · 2/2 diuresis  · Replete Prn K>4  · Trend    Tobacco abuse  Assessment & Plan  · States he quit 2 days ago  · Nicotine replacement therapy  ·  on cessation    Pleural effusion, right  Assessment & Plan  · Status post thoracentesis of 1 2 L 9/6    Chronic anticoagulation  Assessment & Plan  · On chronic Coumadin to prevent thrombosis due to apical akinesis after recent cardiac catheterization  · INR on admit  2 3  · Received 1 dose of vitamin K 5 mg IV yesterday prior to having right thoracentesis 9/6  · Consider restarting home dose of 2 5 if CT scan is negative tonight      PAD (peripheral artery disease) (HCC)  Assessment & Plan  · History of arterial bifem artery has chronic pain    Acute respiratory distress  Assessment & Plan  · C/o shortness of breath with CP, tachypnea, increased WOB, tripoding and dusky face  · 2/2 CP versus flash pulmonary edema, PNA with a component of anxiety-Placed on BiPAP-could not tolerate-currently 4L NC  · Stat CXR-no PTHX, mild pulmonary edema  · Bumex 0 5 mg IV x 1 given  · Wean FiO2 as able to maintain SpO2>90%  · VBG 7 275/43 4/19 7   · Pulmonary tolieting    -------------------------------------------------------------------------------------------------------------  Chief Complaint: " Shortness of breath/CP"    History of Present Illness     Iker Morales is a 56 y o  male with past medical history of CAD status post PCI to LAD and proximal circ April 2022, PAD, tobacco use, CHF with reduced EF, apical akinesis on Coumadin who was admitted overnight to the medicine service for acute congestive heart failure  He was diuresed with 60 mg IV Lasix and started on IV Lasix drip today  Cardiology was consulted and he had a repeat echo showed an EF of 10-15%    His CT scan on admission showed moderate right pleural effusion for which he had a thoracentesis and had 1 2 L removed today by Critical Care  Early this evening he developed shortness of breath, chest pain and was very anxious  Upon examination patient was found to be tachypneic, tripoding with increased work of breathing  BiPAP was applied  Critical Care was consulted and patient was transferred to the ICU for further monitoring    History obtained from chart review and the patient   -------------------------------------------------------------------------------------------------------------  Dispo: Transfer to Stepdown Level 1     Code Status: Level 1 - Full Code  --------------------------------------------------------------------------------------------------------------  Review of Systems   Constitutional: Positive for chills  HENT: Negative for sore throat  Respiratory: Positive for shortness of breath  Cardiovascular: Positive for chest pain and leg swelling  Gastrointestinal: Negative for abdominal distention, abdominal pain, diarrhea, nausea and vomiting  Genitourinary: Negative for difficulty urinating  Musculoskeletal: Negative for back pain and myalgias  Skin: Positive for color change  Neurological: Negative for tremors, seizures and headaches  Psychiatric/Behavioral: The patient is nervous/anxious  All other systems reviewed and are negative  A 12-point, complete review of systems was reviewed and negative except as stated above     Physical Exam  Constitutional:       Appearance: He is ill-appearing  HENT:      Head: Normocephalic and atraumatic  Nose: Nose normal       Mouth/Throat:      Mouth: Mucous membranes are dry  Eyes:      Extraocular Movements: Extraocular movements intact  Conjunctiva/sclera: Conjunctivae normal       Pupils: Pupils are equal, round, and reactive to light  Cardiovascular:      Rate and Rhythm: Regular rhythm  Tachycardia present  Pulses: Normal pulses     Pulmonary:      Effort: Pulmonary effort is normal  No respiratory distress  Comments: Coarse breath sounds   Abdominal:      General: Bowel sounds are normal  There is no distension  Palpations: Abdomen is soft  Musculoskeletal:      Cervical back: Normal range of motion  Right lower leg: 3+ Edema present  Left lower leg: 3+ Edema present  Skin:     General: Skin is warm  Neurological:      Mental Status: He is oriented to person, place, and time  GCS: GCS eye subscore is 4  GCS verbal subscore is 5  GCS motor subscore is 6  Cranial Nerves: Cranial nerves are intact  Sensory: Sensation is intact  Motor: Motor function is intact  Coordination: Coordination is intact  Psychiatric:         Mood and Affect: Mood is anxious  Speech: Speech normal          Behavior: Behavior is cooperative        --------------------------------------------------------------------------------------------------------------  Vitals:   Vitals:    09/06/22 1745 09/06/22 1800 09/06/22 1815 09/06/22 1830   BP: 131/68 122/94 111/83 111/83   BP Location:       Pulse: (!) 113 (!) 108 (!) 108 (!) 107   Resp: (!) 23 (!) 34 19 (!) 31   Temp: (!) 102 74 °F (39 3 °C) (!) 102 92 °F (39 4 °C) (!) 101 66 °F (38 7 °C) (!) 102 38 °F (39 1 °C)   TempSrc:       SpO2: 92% 99% 91% 99%   Weight:       Height:         Temp  Min: 97 5 °F (36 4 °C)  Max: 102 92 °F (39 4 °C)  IBW (Ideal Body Weight): 61 5 kg  Height: 5' 5" (165 1 cm)  Body mass index is 27 62 kg/m²      Laboratory and Diagnostics:  Results from last 7 days   Lab Units 09/06/22  1623 09/06/22  0436 09/05/22  0815   WBC Thousand/uL 8 38 6 92 6 37   HEMOGLOBIN g/dL 11 7* 10 5* 11 1*   HEMATOCRIT % 40 9 35 5* 38 6   PLATELETS Thousands/uL 317 306 293   NEUTROS PCT %  --   --  64   MONOS PCT %  --   --  8     Results from last 7 days   Lab Units 09/06/22  1623 09/06/22  0436 09/05/22  0815   SODIUM mmol/L 137 137 136   POTASSIUM mmol/L 4 1 3 1* 2 7*   CHLORIDE mmol/L 97 98 97 CO2 mmol/L 21 24 28   ANION GAP mmol/L 19* 15* 11   BUN mg/dL 40* 31* 22   CREATININE mg/dL 1 62* 1 35* 0 84   CALCIUM mg/dL 9 8 9 0 9 9   GLUCOSE RANDOM mg/dL 101 90 106   ALT U/L  --   --  41   AST U/L  --   --  29   ALK PHOS U/L  --   --  258*   ALBUMIN g/dL  --   --  3 2*   TOTAL BILIRUBIN mg/dL  --   --  1 06*     Results from last 7 days   Lab Units 09/06/22  1623 09/06/22  0436 09/05/22  0815   MAGNESIUM mg/dL 2 2 2 0 2 1   PHOSPHORUS mg/dL 5 5*  --  4 6*      Results from last 7 days   Lab Units 09/06/22  1624 09/06/22  0436 09/05/22  0815   INR  1 82* 1 96* 2 31*          Results from last 7 days   Lab Units 09/06/22  1623   LACTIC ACID mmol/L 9 3*     ABG:    VBG:  Results from last 7 days   Lab Units 09/06/22  1636   PH DELMER  7 275*   PCO2 DELMER mm Hg 43 4   PO2 DELMER mm Hg 31 5*   HCO3 DELMER mmol/L 19 7*   BASE EXC DELMER mmol/L -6 8     Results from last 7 days   Lab Units 09/06/22  1731   PROCALCITONIN ng/ml 0 82*       Micro:        EKG: Sinus tachycardia  Imaging: I have personally reviewed pertinent reports  and I have personally reviewed pertinent films in PACS      Historical Information   Past Medical History:   Diagnosis Date    [facility]congestive heart failure) ([facility] )     Coronary artery disease     COVID     MI, old      Past Surgical History:   Procedure Laterality Date    ABDOMINAL SURGERY      CORONARY ANGIOPLASTY WITH STENT PLACEMENT  04/10/2022    x 2     Social History   Social History     Substance and Sexual Activity   Alcohol Use Yes     Social History     Substance and Sexual Activity   Drug Use Not Currently     Social History     Tobacco Use   Smoking Status Current Every Day Smoker    Packs/day: 0 25    Types: Cigarettes   Smokeless Tobacco Never Used     Exercise History:ad Jada  Family History:   History reviewed  No pertinent family history    I have reviewed this patient's family history and commented on sigificant items within the HPI      Medications:  Current Facility-Administered Medications   Medication Dose Route Frequency    acetaminophen (TYLENOL) tablet 650 mg  650 mg Oral Q4H PRN    atorvastatin (LIPITOR) tablet 80 mg  80 mg Oral Daily    cefepime (MAXIPIME) IVPB (premix in dextrose) 2,000 mg 50 mL  2,000 mg Intravenous Q12H    clopidogrel (PLAVIX) tablet 75 mg  75 mg Oral Daily    docusate sodium (COLACE) capsule 100 mg  100 mg Oral BID    furosemide (LASIX) 500 mg infusion 50 mL  10 mg/hr Intravenous Continuous    gabapentin (NEURONTIN) capsule 600 mg  600 mg Oral Daily PRN    HYDROmorphone HCl (DILAUDID) injection 0 1 mg  0 1 mg Intravenous Q4H PRN    hydrOXYzine HCL (ATARAX) tablet 50 mg  50 mg Oral Q6H PRN    melatonin tablet 6 mg  6 mg Oral HS    metoprolol succinate (TOPROL-XL) 24 hr tablet 25 mg  25 mg Oral Daily    nicotine (NICODERM CQ) 7 mg/24hr TD 24 hr patch 1 patch  1 patch Transdermal Daily    NOREPINEPHRINE 4 MG  ML NSS (CMPD ORDER) infusion  1-30 mcg/min Intravenous Titrated    ondansetron (ZOFRAN) injection 4 mg  4 mg Intravenous Q6H PRN    oxyCODONE (ROXICODONE) IR tablet 5 mg  5 mg Oral Q6H PRN    polyethylene glycol (MIRALAX) packet 17 g  17 g Oral Daily PRN    potassium chloride (K-DUR,KLOR-CON) CR tablet 40 mEq  40 mEq Oral BID With Meals    vancomycin (VANCOCIN) IVPB (premix in dextrose) 750 mg 150 mL  10 mg/kg Intravenous Q12H     Home medications:  Prior to Admission Medications   Prescriptions Last Dose Informant Patient Reported?  Taking?   acetaminophen (TYLENOL) 325 mg tablet 9/2/2022 at 2200  No Yes   Sig: Take 2 tablets (650 mg total) by mouth every 6 (six) hours as needed for mild pain, headaches or fever   atorvastatin (LIPITOR) 80 mg tablet 9/4/2022 at Unknown time  Yes Yes   Sig: Take 80 mg by mouth daily   benzonatate (TESSALON PERLES) 100 mg capsule Not Taking at Unknown time  No No   Sig: Take 1 capsule (100 mg total) by mouth 3 (three) times a day as needed for cough   Patient not taking: Reported on 9/5/2022   clopidogrel (PLAVIX) 75 mg tablet 9/4/2022 at 0800  Yes Yes   Sig: Take 75 mg by mouth daily   docusate sodium (COLACE) 100 mg capsule Not Taking at Unknown time  No No   Sig: Take 1 capsule (100 mg total) by mouth 2 (two) times a day   Patient not taking: Reported on 9/5/2022   gabapentin (NEURONTIN) 600 MG tablet 9/4/2022 at 2200 Self Yes Yes   Sig: Take 600 mg by mouth daily as needed   melatonin 3 mg Past Week at Unknown time  No Yes   Sig: Take 2 tablets (6 mg total) by mouth daily at bedtime   metoprolol succinate (TOPROL-XL) 25 mg 24 hr tablet 9/4/2022 at 1500  Yes Yes   Sig: Take 25 mg by mouth daily   polyethylene glycol (MIRALAX) 17 g packet Not Taking at Unknown time  No No   Sig: Take 17 g by mouth daily   Patient not taking: Reported on 9/5/2022   potassium chloride (K-DUR,KLOR-CON) 20 mEq tablet Past Week at Unknown time  No Yes   Sig: Take 1 tablet (20 mEq total) by mouth 2 (two) times a day   torsemide (DEMADEX) 20 mg tablet Not Taking at Unknown time  No No   Sig: Take 2 tablets (40 mg total) by mouth daily after breakfast AND 1 tablet (20 mg total) daily after lunch     Patient not taking: Reported on 9/5/2022   warfarin (COUMADIN) 2 5 mg tablet 9/4/2022 at 2200  No Yes   Sig: Take 1 tablet (2 5 mg total) by mouth daily      Facility-Administered Medications: None     Allergies:  No Known Allergies  ------------------------------------------------------------------------------------------------------------  Advance Directive and Living Will:      Power of :    POLST:    ------------------------------------------------------------------------------------------------------------  Care Time Delivered:   Upon my evaluation, this patient had a high probability of imminent or life-threatening deterioration due to Acute respiratory distress, chest pain, severe sepsis, acute congestive heart failure with reduced EF, which required my direct attention, intervention, and personal management  I have personally provided 45 minutes ([id number] [phone] [id number]) of critical care time, exclusive of procedures, teaching, family meetings, and any prior time recorded by providers other than myself  BARBIE Sanon        Portions of the record may have been created with voice recognition software  Occasional wrong word or "sound a like" substitutions may have occurred due to the inherent limitations of voice recognition software    Read the chart carefully and recognize, using context, where substitutions have occurred

## 2022-09-06 NOTE — ASSESSMENT & PLAN NOTE
Baseline 0 8-1  Elevated today to 1 25 likely in setting of diuresis   Need diuresis so will monitor closely

## 2022-09-07 ENCOUNTER — APPOINTMENT (OUTPATIENT)
Dept: RADIOLOGY | Facility: HOSPITAL | Age: 57
DRG: 194 | End: 2022-09-07
Payer: COMMERCIAL

## 2022-09-07 PROBLEM — J93.83 PNEUMOTHORAX, ACUTE: Status: ACTIVE | Noted: 2022-09-07

## 2022-09-07 PROBLEM — R06.03 ACUTE RESPIRATORY DISTRESS: Status: RESOLVED | Noted: 2022-08-06 | Resolved: 2022-09-07

## 2022-09-07 LAB
ALBUMIN SERPL BCP-MCNC: 2.7 G/DL (ref 3.5–5)
ALP SERPL-CCNC: 247 U/L (ref 46–116)
ALT SERPL W P-5'-P-CCNC: 86 U/L (ref 12–78)
ANION GAP SERPL CALCULATED.3IONS-SCNC: 13 MMOL/L (ref 4–13)
ANION GAP SERPL CALCULATED.3IONS-SCNC: 13 MMOL/L (ref 4–13)
ANION GAP SERPL CALCULATED.3IONS-SCNC: 14 MMOL/L (ref 4–13)
ANION GAP SERPL CALCULATED.3IONS-SCNC: 14 MMOL/L (ref 4–13)
AST SERPL W P-5'-P-CCNC: 95 U/L (ref 5–45)
ATRIAL RATE: 112 BPM
ATRIAL RATE: 97 BPM
BILIRUB SERPL-MCNC: 1.96 MG/DL (ref 0.2–1)
BUN SERPL-MCNC: 40 MG/DL (ref 5–25)
BUN SERPL-MCNC: 40 MG/DL (ref 5–25)
BUN SERPL-MCNC: 42 MG/DL (ref 5–25)
BUN SERPL-MCNC: 42 MG/DL (ref 5–25)
CALCIUM ALBUM COR SERPL-MCNC: 9.8 MG/DL (ref 8.3–10.1)
CALCIUM SERPL-MCNC: 8.8 MG/DL (ref 8.3–10.1)
CALCIUM SERPL-MCNC: 8.8 MG/DL (ref 8.3–10.1)
CALCIUM SERPL-MCNC: 9.1 MG/DL (ref 8.3–10.1)
CALCIUM SERPL-MCNC: 9.2 MG/DL (ref 8.3–10.1)
CARDIAC TROPONIN I PNL SERPL HS: 225 NG/L (ref 8–18)
CARDIAC TROPONIN I PNL SERPL HS: 228 NG/L (ref 8–18)
CHLORIDE SERPL-SCNC: 100 MMOL/L (ref 96–108)
CHLORIDE SERPL-SCNC: 97 MMOL/L (ref 96–108)
CO2 SERPL-SCNC: 20 MMOL/L (ref 21–32)
CO2 SERPL-SCNC: 21 MMOL/L (ref 21–32)
CO2 SERPL-SCNC: 21 MMOL/L (ref 21–32)
CO2 SERPL-SCNC: 23 MMOL/L (ref 21–32)
CREAT SERPL-MCNC: 1.28 MG/DL (ref 0.6–1.3)
CREAT SERPL-MCNC: 1.28 MG/DL (ref 0.6–1.3)
CREAT SERPL-MCNC: 1.35 MG/DL (ref 0.6–1.3)
CREAT SERPL-MCNC: 1.38 MG/DL (ref 0.6–1.3)
ERYTHROCYTE [DISTWIDTH] IN BLOOD BY AUTOMATED COUNT: 19.8 % (ref 11.6–15.1)
GFR SERPL CREATININE-BSD FRML MDRD: 57 ML/MIN/1.73SQ M
GFR SERPL CREATININE-BSD FRML MDRD: 58 ML/MIN/1.73SQ M
GFR SERPL CREATININE-BSD FRML MDRD: 62 ML/MIN/1.73SQ M
GFR SERPL CREATININE-BSD FRML MDRD: 62 ML/MIN/1.73SQ M
GLUCOSE SERPL-MCNC: 107 MG/DL (ref 65–140)
GLUCOSE SERPL-MCNC: 113 MG/DL (ref 65–140)
GLUCOSE SERPL-MCNC: 116 MG/DL (ref 65–140)
GLUCOSE SERPL-MCNC: 116 MG/DL (ref 65–140)
HCT VFR BLD AUTO: 37.6 % (ref 36.5–49.3)
HGB BLD-MCNC: 11 G/DL (ref 12–17)
INR PPP: 1.76 (ref 0.84–1.19)
L PNEUMO1 AG UR QL IA.RAPID: NEGATIVE
MAGNESIUM SERPL-MCNC: 2 MG/DL (ref 1.6–2.6)
MCH RBC QN AUTO: 22.2 PG (ref 26.8–34.3)
MCHC RBC AUTO-ENTMCNC: 29.3 G/DL (ref 31.4–37.4)
MCV RBC AUTO: 76 FL (ref 82–98)
P AXIS: 64 DEGREES
P AXIS: 64 DEGREES
PHOSPHATE SERPL-MCNC: 4.1 MG/DL (ref 2.7–4.5)
PLATELET # BLD AUTO: 265 THOUSANDS/UL (ref 149–390)
PMV BLD AUTO: 9.7 FL (ref 8.9–12.7)
POTASSIUM SERPL-SCNC: 4.2 MMOL/L (ref 3.5–5.3)
POTASSIUM SERPL-SCNC: 4.2 MMOL/L (ref 3.5–5.3)
POTASSIUM SERPL-SCNC: 4.3 MMOL/L (ref 3.5–5.3)
POTASSIUM SERPL-SCNC: 4.9 MMOL/L (ref 3.5–5.3)
PR INTERVAL: 156 MS
PR INTERVAL: 164 MS
PROT SERPL-MCNC: 6.1 G/DL (ref 6.4–8.4)
PROTHROMBIN TIME: 20.6 SECONDS (ref 11.6–14.5)
QRS AXIS: 104 DEGREES
QRS AXIS: 109 DEGREES
QRSD INTERVAL: 110 MS
QRSD INTERVAL: 90 MS
QT INTERVAL: 342 MS
QT INTERVAL: 388 MS
QTC INTERVAL: 466 MS
QTC INTERVAL: 492 MS
RBC # BLD AUTO: 4.95 MILLION/UL (ref 3.88–5.62)
S PNEUM AG UR QL: NEGATIVE
SODIUM SERPL-SCNC: 131 MMOL/L (ref 135–147)
SODIUM SERPL-SCNC: 134 MMOL/L (ref 135–147)
SODIUM SERPL-SCNC: 134 MMOL/L (ref 135–147)
SODIUM SERPL-SCNC: 137 MMOL/L (ref 135–147)
T WAVE AXIS: -31 DEGREES
T WAVE AXIS: -42 DEGREES
VENTRICULAR RATE: 112 BPM
VENTRICULAR RATE: 97 BPM
WBC # BLD AUTO: 7.84 THOUSAND/UL (ref 4.31–10.16)

## 2022-09-07 PROCEDURE — 80053 COMPREHEN METABOLIC PANEL: CPT

## 2022-09-07 PROCEDURE — 99233 SBSQ HOSP IP/OBS HIGH 50: CPT | Performed by: ANESTHESIOLOGY

## 2022-09-07 PROCEDURE — 83735 ASSAY OF MAGNESIUM: CPT | Performed by: PHYSICIAN ASSISTANT

## 2022-09-07 PROCEDURE — 80048 BASIC METABOLIC PNL TOTAL CA: CPT

## 2022-09-07 PROCEDURE — 84100 ASSAY OF PHOSPHORUS: CPT | Performed by: PHYSICIAN ASSISTANT

## 2022-09-07 PROCEDURE — 71045 X-RAY EXAM CHEST 1 VIEW: CPT

## 2022-09-07 PROCEDURE — 80048 BASIC METABOLIC PNL TOTAL CA: CPT | Performed by: PHYSICIAN ASSISTANT

## 2022-09-07 PROCEDURE — 85027 COMPLETE CBC AUTOMATED: CPT

## 2022-09-07 PROCEDURE — 87205 SMEAR GRAM STAIN: CPT | Performed by: NURSE PRACTITIONER

## 2022-09-07 PROCEDURE — 87070 CULTURE OTHR SPECIMN AEROBIC: CPT | Performed by: NURSE PRACTITIONER

## 2022-09-07 PROCEDURE — 80048 BASIC METABOLIC PNL TOTAL CA: CPT | Performed by: NURSE PRACTITIONER

## 2022-09-07 PROCEDURE — 84484 ASSAY OF TROPONIN QUANT: CPT | Performed by: PHYSICIAN ASSISTANT

## 2022-09-07 PROCEDURE — 85610 PROTHROMBIN TIME: CPT | Performed by: PHYSICIAN ASSISTANT

## 2022-09-07 RX ORDER — OXYCODONE HYDROCHLORIDE 5 MG/1
2.5 TABLET ORAL EVERY 6 HOURS PRN
Status: DISCONTINUED | OUTPATIENT
Start: 2022-09-07 | End: 2022-09-09 | Stop reason: HOSPADM

## 2022-09-07 RX ORDER — GUAIFENESIN/DEXTROMETHORPHAN 100-10MG/5
10 SYRUP ORAL EVERY 4 HOURS PRN
Status: DISCONTINUED | OUTPATIENT
Start: 2022-09-07 | End: 2022-09-09

## 2022-09-07 RX ORDER — LORAZEPAM 0.5 MG/1
0.25 TABLET ORAL EVERY 8 HOURS PRN
Status: DISCONTINUED | OUTPATIENT
Start: 2022-09-07 | End: 2022-09-09 | Stop reason: HOSPADM

## 2022-09-07 RX ORDER — WARFARIN SODIUM 2.5 MG/1
2.5 TABLET ORAL
Status: DISCONTINUED | OUTPATIENT
Start: 2022-09-07 | End: 2022-09-09 | Stop reason: HOSPADM

## 2022-09-07 RX ADMIN — GUAIFENESIN AND DEXTROMETHORPHAN 10 ML: 100; 10 SYRUP ORAL at 14:41

## 2022-09-07 RX ADMIN — LORAZEPAM 0.25 MG: 0.5 TABLET ORAL at 11:57

## 2022-09-07 RX ADMIN — VANCOMYCIN HYDROCHLORIDE 750 MG: 750 INJECTION, SOLUTION INTRAVENOUS at 18:35

## 2022-09-07 RX ADMIN — CEFEPIME HYDROCHLORIDE 2000 MG: 2 INJECTION, SOLUTION INTRAVENOUS at 17:55

## 2022-09-07 RX ADMIN — GUAIFENESIN AND DEXTROMETHORPHAN 10 ML: 100; 10 SYRUP ORAL at 09:05

## 2022-09-07 RX ADMIN — ACETAMINOPHEN 650 MG: 325 TABLET ORAL at 04:25

## 2022-09-07 RX ADMIN — LORAZEPAM 0.25 MG: 0.5 TABLET ORAL at 21:23

## 2022-09-07 RX ADMIN — HYDROXYZINE HYDROCHLORIDE 50 MG: 25 TABLET ORAL at 08:54

## 2022-09-07 RX ADMIN — OXYCODONE HYDROCHLORIDE 2.5 MG: 5 TABLET ORAL at 14:41

## 2022-09-07 RX ADMIN — VANCOMYCIN HYDROCHLORIDE 750 MG: 750 INJECTION, SOLUTION INTRAVENOUS at 05:55

## 2022-09-07 RX ADMIN — DOCUSATE SODIUM 100 MG: 100 CAPSULE ORAL at 17:54

## 2022-09-07 RX ADMIN — ATORVASTATIN CALCIUM 80 MG: 40 TABLET, FILM COATED ORAL at 08:54

## 2022-09-07 RX ADMIN — Medication 10 MG/HR: at 09:05

## 2022-09-07 RX ADMIN — CEFEPIME HYDROCHLORIDE 2000 MG: 2 INJECTION, SOLUTION INTRAVENOUS at 05:10

## 2022-09-07 RX ADMIN — ONDANSETRON 4 MG: 2 INJECTION INTRAMUSCULAR; INTRAVENOUS at 15:23

## 2022-09-07 RX ADMIN — POTASSIUM CHLORIDE 40 MEQ: 1500 TABLET, EXTENDED RELEASE ORAL at 17:54

## 2022-09-07 RX ADMIN — DOCUSATE SODIUM 100 MG: 100 CAPSULE ORAL at 08:54

## 2022-09-07 RX ADMIN — Medication 6 MG: at 21:24

## 2022-09-07 RX ADMIN — HYDROXYZINE HYDROCHLORIDE 50 MG: 25 TABLET ORAL at 00:58

## 2022-09-07 RX ADMIN — ACETAMINOPHEN 650 MG: 325 TABLET ORAL at 17:54

## 2022-09-07 RX ADMIN — POTASSIUM CHLORIDE 40 MEQ: 1500 TABLET, EXTENDED RELEASE ORAL at 08:54

## 2022-09-07 RX ADMIN — WARFARIN SODIUM 2.5 MG: 2.5 TABLET ORAL at 17:54

## 2022-09-07 RX ADMIN — CLOPIDOGREL BISULFATE 75 MG: 75 TABLET ORAL at 08:54

## 2022-09-07 NOTE — ASSESSMENT & PLAN NOTE
· "Tiny right anterior pneumothorax of less than 5%" seen on CT scan post right thoracentesis on 09/06  · Follow up chest xrays show resolution

## 2022-09-07 NOTE — PROGRESS NOTES
Vancomycin IV Pharmacy-to-Dose Consultation    Alex Eller is a 56 y o  male who is currently receiving Vancomycin IV with management by the Pharmacy Consult service  Assessment/Plan:  The patient was reviewed  Renal function is stable and no signs or symptoms of nephrotoxicity and/or infusion reactions were documented in the chart  Based on todays assessment, continue current vancomycin (day # 2) dosing of 750 mg iv q 12 hrs, with a plan for trough to be drawn at 0530 on 09/08/22  We will continue to follow the patients culture results and clinical progress daily      Ellen Muñiz, Pharmacist

## 2022-09-07 NOTE — PROGRESS NOTES
Progress Note - Cardiology   Preet Garcia 56 y o  male MRN: [id number]  Unit/Bed#: -01 Encounter: [id number]    Assessment:  Acute on chronic HFrEF with R pleural effusion (previous thoracentesis was transudative)              - on torsemide 20 mg PO BID as an OP              - hypokalemic- replaced              - sp thoracentesis yesterday    - received lasix 10 mg/hr gtt and bumex IV x 1   - negative >3 L since admission  Sepsis with suspected PNA on IV abx   Ischemic cardiomyopathy- on BB but not losartan currently  CAD sp STEMI 4/10/22 sp PCI To LAD and PCI to prox circ  Apical akinesis on coumadin (on hold for thoracentesis not yet resumed)  PAD  Smoking  anxiety    Plan: 1  Continue  lasix drip at 10 mg/hr  2  kdur 40 meq PO BID  3  Recommend life vest given persistently low LVEF  Will need eventual evaluation for ICD  4  Monitor I and O, daily standing weights, renal function and electrolytes  BMP BID  5  Resume coumadin when clinically appropriate  6  Treat underlying possible PNA  7  No further changes in regimen at this time    Subjective/Objective     Subjective: pt reports breathing remains difficult  Reports a lot of anxiety  No chest pain currently  Remains with significant edema  Appetite is good  Objective: did not diurese initially well on lasix gtt  Given dose of IV bumex  Now diuresing more appropriately  Sp thoracentesis yesterday  Suspected pneumonia on IV abx       Vitals: BP 94/73 (BP Location: Left arm)   Pulse 95   Temp 99 3 °F (37 4 °C)   Resp (!) 25   Ht 5' 5" (1 651 m)   Wt 72 1 kg (158 lb 15 2 oz)   SpO2 100%   BMI 26 45 kg/m²   Vitals:    09/06/22 1030 09/07/22 0555   Weight: 75 3 kg (166 lb) 72 1 kg (158 lb 15 2 oz)     Orthostatic Blood Pressures    Flowsheet Row Most Recent Value   Blood Pressure 94/73 filed at 09/07/2022 0710   Patient Position - Orthostatic VS Lying filed at 09/07/2022 0710            Intake/Output Summary (Last 24 hours) at 9/7/2022 9443  Last data filed at 9/7/2022 0802  Gross per 24 hour   Intake 548 75 ml   Output 1700 ml   Net -1151 25 ml       Invasive Devices  Report    Peripheral Intravenous Line  Duration           Peripheral IV 09/05/22 Left Antecubital 2 days    Peripheral IV 09/06/22 Right;Upper;Ventral (anterior) Arm <1 day          Drain  Duration           Urethral Catheter Temperature probe 16 Fr  <1 day                      Physical Exam  Constitutional:       Appearance: Normal appearance  HENT:      Head: Normocephalic and atraumatic  Cardiovascular:      Rate and Rhythm: Normal rate  Heart sounds: No murmur heard  No gallop  Pulmonary:      Breath sounds: No wheezing  Comments: Decreased breath sounds in R lung diffusely  Abdominal:      Palpations: Abdomen is soft  Musculoskeletal:         General: Swelling present  Cervical back: Neck supple  Skin:     General: Skin is warm and dry  Capillary Refill: Capillary refill takes less than 2 seconds  Neurological:      General: No focal deficit present  Mental Status: He is alert and oriented to person, place, and time          Lab Results:   I have personally reviewed pertinent lab results      CBC with diff:   Results from last 7 days   Lab Units 09/07/22  0552   WBC Thousand/uL 7 84   RBC Million/uL 4 95   HEMOGLOBIN g/dL 11 0*   HEMATOCRIT % 37 6   MCV fL 76*   MCH pg 22 2*   MCHC g/dL 29 3*   RDW % 19 8*   MPV fL 9 7   PLATELETS Thousands/uL 265     CMP:   Results from last 7 days   Lab Units 09/07/22  0552   SODIUM mmol/L 134*  134*   CHLORIDE mmol/L 100  100   CO2 mmol/L 21  21   BUN mg/dL 42*  42*   CREATININE mg/dL 1 28  1 28   CALCIUM mg/dL 8 8  8 8   AST U/L 95*   ALT U/L 86*   ALK PHOS U/L 247*   EGFR ml/min/1 73sq m 62  62     HS Troponin:   0   Lab Value Date/Time    HSTNI 225 (H) 09/07/2022 0130    HSTNI0 48 09/06/2022 1558    HSTNI2 86 (H) 09/06/2022 1729    HSTNI4 150 (H) 09/06/2022 2021    HSTNI4 15 08/06/2022 1728     BNP: Results from last 7 days   Lab Units 09/07/22  0552   POTASSIUM mmol/L 4 2  4 2   CHLORIDE mmol/L 100  100   CO2 mmol/L 21  21   BUN mg/dL 42*  42*   CREATININE mg/dL 1 28  1 28   CALCIUM mg/dL 8 8  8 8   EGFR ml/min/1 73sq m 62  62     Coags:   Results from last 7 days   Lab Units 09/07/22  0552   INR  1 76*     TSH:     Magnesium:   Results from last 7 days   Lab Units 09/07/22  0552   MAGNESIUM mg/dL 2 0     Lipid Profile:     Imaging: I have personally reviewed pertinent reports  Echo 9/6/22:       Left Ventricle: Definity echo enhancement utilized to improve endocardial border definition given inability to adequately visualize 2 contiguous endocardial border segments and for improved visualization of the LV apex  The left ventricle demonstrates normal wall thickness with enlarged LV cavity size is severely reduced LV function  Estimated ejection fraction is 10-15%  There is severe global hypokinesis with akinesis involving the septum, anterior septum, and apex  Diastolic assessment was not performed    Right Ventricle: The right ventricle appears enlarged with reduced function    Mitral Valve: Mild thickening of the mitral leaflets with preserved excursion and moderate central insufficiency  Mitral regurgitation likely functional given enlarged LV cavity dimensions    Tricuspid Valve: Grossly normal-appearing tricuspid leaflets with mild regurgitation  Increased estimated pulmonary pressure of 56 mmHg    Aorta: The aortic root is normal in size  The ascending aorta is mildly dilated      Compared to prior study performed April 4045, LV systolic function was reduced with the ejection fraction at that time estimated in the range of 25-29%  Hypokinesis described globally but also involving the mid to distal anterior, septal, lateral, apical segments    The right ventricle was described as having normal size with mildly reduced RV function          EKG:   Narrative & Impression Normal sinus rhythm  Possible Left atrial enlargement  Rightward axis  Septal infarct (cited on or before 22-AUG-2022)  Nonspecific ST abnormality  Prolonged QT  Abnormal ECG  When compared with ECG of 22-AUG-2022 03:37,  Premature ventricular complexes are no longer Present  T wave inversion more evident in Inferior leads  Confirmed by Marcello Roy ([id number]) on 9/5/2022 10:19:14 PM

## 2022-09-07 NOTE — ASSESSMENT & PLAN NOTE
· Baseline appears to be 0 8-1  · Now 1 62  · 2/2 to diuresis  · Avoid nephrotoxins  · Trend renal indices

## 2022-09-07 NOTE — ASSESSMENT & PLAN NOTE
· Patient met sepsis criteria with hyperthermia, tachycardia, tachypnea, and lactic acidosis of 9 3  · Patient not resuscitated with 30 ml/kg per protocol secondary to heart failure, new EF of 10%, and being diuresed  · Lactic acid peaked at 9 3, then downtrending  · Blood cultures x2, UA, urine culture pending  · Suspect PNA  · CXR-pulmonary edema  · Procal 0 82  · White blood cell count of 10, without any bands  · Repeat CT chest abd/pelvis - tiny right anterior PTX <5%  Significant reduction in right pl   effusion  · Cefepime/Vanco started-if procal neg x 2 dc  · MRSA swab pending  · Trend WBC and fever curve

## 2022-09-07 NOTE — ASSESSMENT & PLAN NOTE
· On chronic Coumadin to prevent thrombosis due to apical akinesis after recent cardiac catheterization  · INR on admit  2 3  · Received 1 dose of vitamin K 5 mg IV 9/5/22 prior to having right thoracentesis 9/6  · Consider restarting home dose of Coumadin 2 5 mg if CXR is stable today and no chest tube needed

## 2022-09-07 NOTE — ASSESSMENT & PLAN NOTE
Wt Readings from Last 3 Encounters:   09/06/22 75 3 kg (166 lb)   08/22/22 70 6 kg (155 lb 10 3 oz)   08/11/22 65 6 kg (144 lb 9 6 oz)     · 9/6/22 Echo LVEF 10-15% down from his previous echo which was 20% consider LifeVest vs ICD  · CT with large right-sided pleural effusion status post right thoracentesis of 1 2L on 9/6  · Cardiology consult appreciate recs  · Continue Lasix drip 10 milligrams/hour  · Received Bumex 0 5 mg IV-consider diuresis goal of  -1 to 2 L  · Davies for strict I&Os

## 2022-09-07 NOTE — ASSESSMENT & PLAN NOTE
· C/o right severe right sided chest pain 9/10-radiates to abdomen   · Suspect more pleuritic from lung reexpansion s/p right thoracentesis  · EKG without acute ischemic changes  · Stat CT chest/abdomen and pelvis- tiny right anterior PTX <5%  Significant reduction in right pl  effusion  · Troponins mildly elevated and slowly uptrending- likely demand mediated- no ischemic changes on EKG    · Dilaudid 0 1 mg Iv prn severe pain and oxycodone

## 2022-09-07 NOTE — PROGRESS NOTES
[address]  Progress Note - Janeth Menjivar 1965, 56 y o  male MRN: [id number]  Unit/Bed#: -01 Encounter: [id number]  Primary Care Provider: Lucas Baker, DO   Date and time admitted to hospital: 9/5/2022  8:01 AM    * Acute on chronic systolic CHF (congestive heart failure) ([facility] [address] )  Assessment & Plan  Wt Readings from Last 3 Encounters:   09/06/22 75 3 kg (166 lb)   08/22/22 70 6 kg (155 lb 10 3 oz)   08/11/22 65 6 kg (144 lb 9 6 oz)     · 9/6/22 Echo LVEF 10-15% down from his previous echo which was 20% consider LifeVest vs ICD  · CT with large right-sided pleural effusion status post right thoracentesis of 1 2L on 9/6  · Cardiology consult appreciate recs  · Continue Lasix drip 10 milligrams/hour  · Received Bumex 0 5 mg IV-consider diuresis goal of  -1 to 2 L  · Samson for strict I&Os        CAD (coronary artery disease)  Assessment & Plan  · S/p PCI of LAD with stent on 4/10/2022  · Continue Plavix  · Cardiology consulted  · Trend troponin    Sepsis ([facility] )  Assessment & Plan  · Patient met sepsis criteria with hyperthermia, tachycardia, tachypnea, and lactic acidosis of 9 3  · Patient not resuscitated with 30 ml/kg per protocol secondary to heart failure, new EF of 10%, and being diuresed  · Lactic acid peaked at 9 3, then downtrending  · Blood cultures x2, UA, urine culture pending  · Suspect PNA  · CXR-pulmonary edema  · Procal 0 82  · White blood cell count of 10, without any bands  · Repeat CT chest abd/pelvis - tiny right anterior PTX <5%  Significant reduction in right pl  effusion  · Cefepime/Vanco started-if procal neg x 2 dc  · MRSA swab pending  · Trend WBC and fever curve      Chest pain  Assessment & Plan  · C/o right severe right sided chest pain 9/10-radiates to abdomen   · Suspect more pleuritic from lung reexpansion s/p right thoracentesis  · EKG without acute ischemic changes  · Stat CT chest/abdomen and pelvis- tiny right anterior PTX <5%   Significant reduction in right pl  effusion  · Troponins mildly elevated and slowly uptrending- likely demand mediated- no ischemic changes on EKG  · Dilaudid 0 1 mg Iv prn severe pain and oxycodone     LOREE (acute kidney injury) ([facility] [address] )  Assessment & Plan  · Baseline appears to be 0 8-1  · Now 1 62  · 2/2 to diuresis  · Avoid nephrotoxins  · Trend renal indices    Hypokalemia  Assessment & Plan  · 2/2 diuresis  · Replete Prn K>4  · Trend    Tobacco abuse  Assessment & Plan  · States he quit 2 days ago  · Nicotine replacement therapy  ·  on cessation    Pleural effusion, right  Assessment & Plan  · Status post thoracentesis of 1 2 L 9/6    Chronic anticoagulation  Assessment & Plan  · On chronic Coumadin to prevent thrombosis due to apical akinesis after recent cardiac catheterization  · INR on admit  2 3  · Received 1 dose of vitamin K 5 mg IV 9/5/22 prior to having right thoracentesis 9/6  · Consider restarting home dose of Coumadin 2 5 mg if CXR is stable today and no chest tube needed      PAD (peripheral artery disease) ([facility])  Assessment & Plan  · History of arterial bifem artery has chronic pain      ----------------------------------------------------------------------------------------  HPI/24hr events: Lactate's down trended, no longer following  Diuresing well with Lasix gtt @ 10 mg/hr  Still intermittantly complains of right sided pleuritic chest pain, improved with dilaudid IV  Repeat CXR last night showed no change in tiny PTX  Troponins slowly up trending, then mild decrease- will D/C trend, although EKG without ischemic changes  Cardiology following      Patient appropriate for transfer out of the ICU today?: No  Disposition: Transfer to Cardiology for ICD  Code Status: Level 1 - Full Code  ---------------------------------------------------------------------------------------  SUBJECTIVE    Review of Systems  Review of systems was reviewed and negative unless stated above in HPI/24-hour events ---------------------------------------------------------------------------------------  OBJECTIVE    Vitals   Vitals:    22 2345 22 0000 22 0015 22 0030   BP: 98/70 98/72 100/81 100/73   BP Location:  Left arm     Pulse: 92 91 91 93   Resp: 17 12 (!) 25 17   Temp: 98 42 °F (36 9 °C) 98 6 °F (37 °C) 98 6 °F (37 °C) 98 6 °F (37 °C)   TempSrc:  Bladder     SpO2: 98% 99% 98% 98%   Weight:       Height:         Temp (24hrs), Av 3 °F (37 9 °C), Min:97 5 °F (36 4 °C), Max:102 92 °F (39 4 °C)  Current: Temperature: 98 6 °F (37 °C)          Respiratory:  SpO2: SpO2: 98 %  Nasal Cannula O2 Flow Rate (L/min): 4 L/min    Invasive/non-invasive ventilation settings   Respiratory  Report   Lab Data (Last 4 hours)    None         O2/Vent Data (Last 4 hours)    None                Physical Exam  Vitals and nursing note reviewed  Constitutional:       General: He is not in acute distress  Appearance: He is ill-appearing  HENT:      Head: Normocephalic  Mouth/Throat:      Mouth: Mucous membranes are moist    Eyes:      Conjunctiva/sclera: Conjunctivae normal       Pupils: Pupils are equal, round, and reactive to light  Cardiovascular:      Rate and Rhythm: Normal rate and regular rhythm  Heart sounds: Normal heart sounds  Pulmonary:      Breath sounds: Rhonchi present  Abdominal:      General: Bowel sounds are normal  There is no distension  Palpations: Abdomen is soft  Tenderness: There is no abdominal tenderness  There is no guarding  Musculoskeletal:      Right lower leg: Edema present  Left lower leg: Edema present  Skin:     General: Skin is warm  Neurological:      General: No focal deficit present  Mental Status: He is alert and oriented to person, place, and time               Laboratory and Diagnostics:  Results from last 7 days   Lab Units 22  1623 22  0436 22  0815   WBC Thousand/uL 8 38 6 92 6 37   HEMOGLOBIN g/dL 11 7* 10 5* 11 1*   HEMATOCRIT % 40 9 35 5* 38 6   PLATELETS Thousands/uL 317 306 293   NEUTROS PCT %  --   --  64   MONOS PCT %  --   --  8     Results from last 7 days   Lab Units 09/06/22  1623 09/06/22  0436 09/05/22  0815   SODIUM mmol/L 137 137 136   POTASSIUM mmol/L 4 1 3 1* 2 7*   CHLORIDE mmol/L 97 98 97   CO2 mmol/L 21 24 28   ANION GAP mmol/L 19* 15* 11   BUN mg/dL 40* 31* 22   CREATININE mg/dL 1 62* 1 35* 0 84   CALCIUM mg/dL 9 8 9 0 9 9   GLUCOSE RANDOM mg/dL 101 90 106   ALT U/L  --   --  41   AST U/L  --   --  29   ALK PHOS U/L  --   --  258*   ALBUMIN g/dL  --   --  3 2*   TOTAL BILIRUBIN mg/dL  --   --  1 06*     Results from last 7 days   Lab Units 09/06/22  1623 09/06/22  0436 09/05/22  0815   MAGNESIUM mg/dL 2 2 2 0 2 1   PHOSPHORUS mg/dL 5 5*  --  4 6*      Results from last 7 days   Lab Units 09/06/22  1624 09/06/22  0436 09/05/22  0815   INR  1 82* 1 96* 2 31*          Results from last 7 days   Lab Units 09/06/22  2055 09/06/22  1901 09/06/22  1623   LACTIC ACID mmol/L 3 5* 6 5* 9 3*     ABG:    VBG:  Results from last 7 days   Lab Units 09/06/22  2107   PH DELMER  7 398   PCO2 DELMER mm Hg 34 4*   PO2 DELMER mm Hg 43 0   HCO3 DELMER mmol/L 20 7*   BASE EXC DELMER mmol/L -3 3     Results from last 7 days   Lab Units 09/06/22  1731   PROCALCITONIN ng/ml 0 82*       Micro  Results from last 7 days   Lab Units 09/06/22  1749 09/06/22  1730 09/06/22  1727   BLOOD CULTURE   --  Received in Microbiology Lab  Culture in Progress  Received in Microbiology Lab  Culture in Progress  LEGIONELLA URINARY ANTIGEN  Negative  --   --    STREP PNEUMONIAE ANTIGEN, URINE  Negative  --   --        EKG:   Imaging: I have personally reviewed pertinent reports  Intake and Output  I/O       09/05 0701  09/06 0700 09/06 0701  09/07 0700    P  O  480 600    I V  (mL/kg)  6 5 (0 1)    IV Piggyback 250     Total Intake(mL/kg) 730 (9 7) 606 5 (8 1)    Urine (mL/kg/hr) 2225 1925 (1 1)    Total Output 2225 1925    Net -1494 -0386 5 Unmeasured Urine Occurrence 1 x 1 x          Height and Weights   Height: 5' 5" (165 1 cm)  IBW (Ideal Body Weight): 61 5 kg  Body mass index is 27 62 kg/m²  Weight (last 2 days)     Date/Time Weight    09/06/22 1030 75 3 (166)    09/06/22 0600 75 4 (166 23)    09/05/22 1332 72 7 (160 2)    09/05/22 0805 73 (160 94)            Nutrition       Diet Orders   (From admission, onward)             Start     Ordered    09/06/22 2105  Diet NPO; Ice chips  Diet effective now        References:    Nutrtion Support Algorithm Enteral vs  Parenteral   Question Answer Comment   Diet Type NPO    NPO Except: Ice chips    RD to adjust diet per protocol?  No        09/06/22 2104                  Active Medications  Scheduled Meds:  Current Facility-Administered Medications   Medication Dose Route Frequency Provider Last Rate    acetaminophen  650 mg Oral Q4H PRN Kane Ramachandran MD      atorvastatin  80 mg Oral Daily Kane Ramachandran MD      cefepime  2,000 mg Intravenous Q12H BARBIE Hernández 2,000 mg (09/06/22 1743)    clopidogrel  75 mg Oral Daily BARBIE Hernández      docusate sodium  100 mg Oral BID Kane Ramachandran MD      furosemide  10 mg/hr Intravenous Continuous Kane Ramachandran MD 10 mg/hr (09/06/22 1116)    gabapentin  600 mg Oral Daily PRN Kane Ramachandran MD      HYDROmorphone  0 1 mg Intravenous Q4H PRN BARBIE Hernández      hydrOXYzine HCL  50 mg Oral Q6H PRN Kane Ramachandran MD      melatonin  6 mg Oral HS Kane Ramachandran MD      nicotine  1 patch Transdermal Daily Kane Ramachandran MD      norepinephrine  1-30 mcg/min Intravenous Titrated BARBIE Diaz Stopped (09/06/22 1748)    ondansetron  4 mg Intravenous Q6H PRN Kane Ramachandran MD      oxyCODONE  5 mg Oral Q6H PRN BARBIE Diaz      polyethylene glycol  17 g Oral Daily PRN Kane Ramachandran MD      potassium chloride  40 mEq Oral BID With Meals Kane Ramachandran MD      vancomycin 10 mg/kg Intravenous Q12H Dylon Abreu MD Stopped (09/06/22 1930)     Continuous Infusions:  furosemide, 10 mg/hr, Last Rate: 10 mg/hr (09/06/22 1116)  norepinephrine, 1-30 mcg/min, Last Rate: Stopped (09/06/22 1748)      PRN Meds:   acetaminophen, 650 mg, Q4H PRN  gabapentin, 600 mg, Daily PRN  HYDROmorphone, 0 1 mg, Q4H PRN  hydrOXYzine HCL, 50 mg, Q6H PRN  ondansetron, 4 mg, Q6H PRN  oxyCODONE, 5 mg, Q6H PRN  polyethylene glycol, 17 g, Daily PRN        Invasive Devices Review  Invasive Devices  Report    Peripheral Intravenous Line  Duration           Peripheral IV 09/05/22 Left Antecubital 1 day    Peripheral IV 09/06/22 Right;Upper;Ventral (anterior) Arm <1 day          Drain  Duration           Urethral Catheter Temperature probe 16 Fr  <1 day                Rationale for remaining devices:   ---------------------------------------------------------------------------------------  Advance Directive and Living Will:      Power of :    POLST:    ---------------------------------------------------------------------------------------  Care Time Delivered:   No Critical Care time spent       Tacho Pa PA-C      Portions of the record may have been created with voice recognition software  Occasional wrong word or "sound a like" substitutions may have occurred due to the inherent limitations of voice recognition software    Read the chart carefully and recognize, using context, where substitutions have occurred

## 2022-09-07 NOTE — ASSESSMENT & PLAN NOTE
Wt Readings from Last 3 Encounters:   09/07/22 72 1 kg (158 lb 15 2 oz)   08/22/22 70 6 kg (155 lb 10 3 oz)   08/11/22 65 6 kg (144 lb 9 6 oz)     · 9/6/22 Echo LVEF 10-15% down from his previous echo which was 20% consider LifeVest vs ICD  · CT with large right-sided pleural effusion status post right thoracentesis of 1 2L on 9/6  · Cardiology consult appreciate recs  · Continue Lasix drip 10 milligrams/hour per Cardiology recs  · Received Bumex 0 5 mg IV-consider diuresis goal of  -1 to 2 L  · Davies for strict I&Os

## 2022-09-07 NOTE — CASE MANAGEMENT
Case Management Assessment & Discharge Planning Note    Patient name Tea Gong  Location /-67 MRN [id number]  : 1965 Date 2022       Current Admission Date: 2022  Current Admission Diagnosis:Acute on chronic systolic CHF (congestive heart failure) [facility])   Patient Active Problem List    Diagnosis Date Noted    Pneumothorax, acute 2022    LOREE (acute kidney injury) ([place] [id number] ) 2022    Chest pain 2022    Sepsis ([place] [address] ) 2022    CAD (coronary artery disease) 2022    Hypokalemia 2022    Acute on chronic systolic CHF (congestive heart failure) ([place] [address] ) 2022    Pneumonia 2022    PAD (peripheral artery disease) ([place] [address] ) 2022    History of COVID-19 2022    Chronic anticoagulation 2022    Pleural effusion, right 2022    Tobacco abuse 2022    Prolonged Q-T interval on ECG 2022      LOS (days): 2  Geometric Mean LOS (GMLOS) (days):   Days to GMLOS:     OBJECTIVE:  PATIENT READMITTED TO HOSPITAL  Risk of Unplanned Readmission Score: 24 63         Current admission status: Inpatient  Referral Reason:  (dc planning and readmission)    Preferred Pharmacy:   [address] [place] [address] [address] [place] [address],[address]  [address] [place] [id number]  Phone: [phone] Fax: [phone]    Primary Care Provider: Jhoan Naylor DO    Primary Insurance: [place] [employer]  Secondary Insurance:     Admit with Acute HF  On IV Lasix infusion  Thoracentesis was done yesterday   On 02 and IV Maxipime  Pt may need a Life Vest    CM met with patient and Mother at the bedside,baseline information  was obtained  CM discussed the role of CM in helping the patient develop a discharge plan and assist the patient in carry out their plan  Pt resides with sister and her  in a bilevel home, patients mother resides next door  Per Michael Porfirio his Mother goes to his appointments    Pt has 3 adult children, he was very vague about them  ASSESSMENT:  Active Health Care Proxies    There are no active Health Care Proxies on file  Advance Directives  Does patient have a [address]?: No  Was patient offered paperwork?: Yes (declined)  Does patient currently have a Health Care decision maker?: Yes, please see Health Care Proxy section  Does patient have Advance Directives?: No  Was patient offered paperwork?: Yes (declined)  Primary Contact: Clare Frank, ,  [phone]         Readmission Root Cause  30 Day Readmission: Yes  Who directed you to return to the hospital?: Self  Did you understand whom to contact if you had questions or problems?: Yes  Did you get your prescriptions before you left the hospital?: No  Reason[de-identified] Preference for own pharmacy  Were you able to get your prescriptions filled when you left the hospital?: Yes  Did you take your medications as prescribed?: No  Were you able to get to your follow-up appointments?: No  Reason[de-identified] Other (comment) (appt was not made)  Patient was readmitted due to: HF    8 6-11 was here with HF  Action Plan: Possible ICD placement- Refusing [facility]- will need PCP appointment made    Patient Information  Admitted from[de-identified] Home  Mental Status: Alert  During Assessment patient was accompanied by: Parent  Assessment information provided by[de-identified] Patient  Primary Caregiver: Self  Support Systems: Parent (Sister and ANAI)  [place] of Residence: [facility] do you live in?: [facility] entry access options   Select all that apply : Stairs  Do the steps have railings?: Yes  Type of Current Residence: Bi-level  Upon entering residence, is there a bedroom on the main floor (no further steps)?: No  A bedroom is located on the following floor levels of residence (select all that apply):: 2nd Floor  Number of steps to 2nd floor from main floor: 6  In the last 12 months, was there a time when you were not able to pay the mortgage or rent on time?: No  In the last 12 months, how many places have you lived?: 1  In the last 12 months, was there a time when you did not have a steady place to sleep or slept in a shelter (including now)?: No  Homeless/housing insecurity resource given?: No  Living Arrangements: Other (Comment) (Lives with family members)  Is patient a ?: No    Activities of Daily Living Prior to Admission  Functional Status: Independent  Completes ADLs independently?: Yes  Ambulates independently?: Yes  Does patient use assisted devices?: No  Does patient currently own DME?: Yes  What DME does the patient currently own?: [employer]  Does patient have a history of Outpatient Therapy (PT/OT)?: No  Does the patient have a history of Short-Term Rehab?: No  Does patient have a history of HHC?: No  [facility] No         Patient Information Continued  Income Source: Pension/halfway  Does patient have prescription coverage?: Yes  Within the past 12 months, you worried that your food would run out before you got the money to buy more : Never true  Within the past 12 months, the food you bought just didn't last and you didn't have money to get more : Never true  Food insecurity resource given?: No  Does patient receive dialysis treatments?: No  Does patient have a history of substance abuse?: No  Does patient have a history of Mental Health Diagnosis?: No         Means of Transportation  Means of Transport to Appts[de-identified] Drives Self  In the past 12 months, has lack of transportation kept you from medical appointments or from getting medications?: No  In the past 12 months, has lack of transportation kept you from meetings, work, or from getting things needed for daily living?: No  Was application for public transport provided?: N/A        DISCHARGE DETAILS:    Discharge planning discussed with[de-identified] patient and Mother  Freedom of Choice: Yes     CM contacted family/caregiver?: Yes             Contacts  Patient Contacts:  Mother Leticia  Relationship to Patient[de-identified] Family  Contact Method: In [facility]         Is the patient interested in [facility] at discharge?: No (politely declined)    DME Referral Provided  Referral made for DME?: No              Treatment Team Recommendation: Home  Discharge Destination Plan[de-identified] Home  Transport at Discharge : Family      CM will continue to follow- [address] [facility], patient will need PCP appointment made upon dc    Also following for possible Life Vest

## 2022-09-08 ENCOUNTER — APPOINTMENT (OUTPATIENT)
Dept: RADIOLOGY | Facility: HOSPITAL | Age: 57
DRG: 194 | End: 2022-09-08
Payer: COMMERCIAL

## 2022-09-08 LAB
ALBUMIN SERPL BCP-MCNC: 2.6 G/DL (ref 3.5–5)
ALP SERPL-CCNC: 273 U/L (ref 46–116)
ALT SERPL W P-5'-P-CCNC: 91 U/L (ref 12–78)
ANION GAP SERPL CALCULATED.3IONS-SCNC: 12 MMOL/L (ref 4–13)
AST SERPL W P-5'-P-CCNC: 88 U/L (ref 5–45)
BILIRUB DIRECT SERPL-MCNC: 1.28 MG/DL (ref 0–0.2)
BILIRUB SERPL-MCNC: 2.14 MG/DL (ref 0.2–1)
BUN SERPL-MCNC: 33 MG/DL (ref 5–25)
CALCIUM SERPL-MCNC: 8.4 MG/DL (ref 8.3–10.1)
CHLORIDE SERPL-SCNC: 99 MMOL/L (ref 96–108)
CO2 SERPL-SCNC: 22 MMOL/L (ref 21–32)
CREAT SERPL-MCNC: 1.25 MG/DL (ref 0.6–1.3)
ERYTHROCYTE [DISTWIDTH] IN BLOOD BY AUTOMATED COUNT: 19.9 % (ref 11.6–15.1)
FLUAV RNA RESP QL NAA+PROBE: NEGATIVE
FLUBV RNA RESP QL NAA+PROBE: NEGATIVE
GFR SERPL CREATININE-BSD FRML MDRD: 64 ML/MIN/1.73SQ M
GLUCOSE SERPL-MCNC: 118 MG/DL (ref 65–140)
HCT VFR BLD AUTO: 34.8 % (ref 36.5–49.3)
HGB BLD-MCNC: 10.2 G/DL (ref 12–17)
INR PPP: 2.01 (ref 0.84–1.19)
MAGNESIUM SERPL-MCNC: 2.1 MG/DL (ref 1.6–2.6)
MCH RBC QN AUTO: 22.5 PG (ref 26.8–34.3)
MCHC RBC AUTO-ENTMCNC: 29.3 G/DL (ref 31.4–37.4)
MCV RBC AUTO: 77 FL (ref 82–98)
MRSA NOSE QL CULT: NORMAL
PHOSPHATE SERPL-MCNC: 3.2 MG/DL (ref 2.7–4.5)
PLATELET # BLD AUTO: 223 THOUSANDS/UL (ref 149–390)
PMV BLD AUTO: 9.8 FL (ref 8.9–12.7)
POTASSIUM SERPL-SCNC: 4.5 MMOL/L (ref 3.5–5.3)
PROT SERPL-MCNC: 6.1 G/DL (ref 6.4–8.4)
PROTHROMBIN TIME: 22.9 SECONDS (ref 11.6–14.5)
RBC # BLD AUTO: 4.54 MILLION/UL (ref 3.88–5.62)
RSV RNA RESP QL NAA+PROBE: NEGATIVE
SARS-COV-2 RNA RESP QL NAA+PROBE: NEGATIVE
SODIUM SERPL-SCNC: 133 MMOL/L (ref 135–147)
VANCOMYCIN TROUGH SERPL-MCNC: 12.9 UG/ML (ref 10–20)
WBC # BLD AUTO: 9.32 THOUSAND/UL (ref 4.31–10.16)

## 2022-09-08 PROCEDURE — 80202 ASSAY OF VANCOMYCIN: CPT | Performed by: NURSE PRACTITIONER

## 2022-09-08 PROCEDURE — 80076 HEPATIC FUNCTION PANEL: CPT | Performed by: NURSE PRACTITIONER

## 2022-09-08 PROCEDURE — 71045 X-RAY EXAM CHEST 1 VIEW: CPT

## 2022-09-08 PROCEDURE — 85610 PROTHROMBIN TIME: CPT | Performed by: NURSE PRACTITIONER

## 2022-09-08 PROCEDURE — 0241U HB NFCT DS VIR RESP RNA 4 TRGT: CPT | Performed by: FAMILY MEDICINE

## 2022-09-08 PROCEDURE — 84100 ASSAY OF PHOSPHORUS: CPT | Performed by: NURSE PRACTITIONER

## 2022-09-08 PROCEDURE — 99233 SBSQ HOSP IP/OBS HIGH 50: CPT | Performed by: FAMILY MEDICINE

## 2022-09-08 PROCEDURE — 85027 COMPLETE CBC AUTOMATED: CPT | Performed by: NURSE PRACTITIONER

## 2022-09-08 PROCEDURE — 83735 ASSAY OF MAGNESIUM: CPT | Performed by: NURSE PRACTITIONER

## 2022-09-08 PROCEDURE — 80048 BASIC METABOLIC PNL TOTAL CA: CPT | Performed by: NURSE PRACTITIONER

## 2022-09-08 RX ORDER — VANCOMYCIN HYDROCHLORIDE 1 G/200ML
1000 INJECTION, SOLUTION INTRAVENOUS EVERY 12 HOURS
Status: DISCONTINUED | OUTPATIENT
Start: 2022-09-08 | End: 2022-09-08

## 2022-09-08 RX ADMIN — LORAZEPAM 0.25 MG: 0.5 TABLET ORAL at 06:12

## 2022-09-08 RX ADMIN — WARFARIN SODIUM 2.5 MG: 2.5 TABLET ORAL at 18:27

## 2022-09-08 RX ADMIN — ONDANSETRON 4 MG: 2 INJECTION INTRAMUSCULAR; INTRAVENOUS at 18:30

## 2022-09-08 RX ADMIN — POTASSIUM CHLORIDE 40 MEQ: 1500 TABLET, EXTENDED RELEASE ORAL at 16:34

## 2022-09-08 RX ADMIN — POTASSIUM CHLORIDE 40 MEQ: 1500 TABLET, EXTENDED RELEASE ORAL at 08:08

## 2022-09-08 RX ADMIN — CLOPIDOGREL BISULFATE 75 MG: 75 TABLET ORAL at 08:09

## 2022-09-08 RX ADMIN — ATORVASTATIN CALCIUM 80 MG: 40 TABLET, FILM COATED ORAL at 08:08

## 2022-09-08 RX ADMIN — CEFEPIME HYDROCHLORIDE 2000 MG: 2 INJECTION, SOLUTION INTRAVENOUS at 18:27

## 2022-09-08 RX ADMIN — LORAZEPAM 0.25 MG: 0.5 TABLET ORAL at 13:59

## 2022-09-08 RX ADMIN — CEFEPIME HYDROCHLORIDE 2000 MG: 2 INJECTION, SOLUTION INTRAVENOUS at 05:39

## 2022-09-08 RX ADMIN — DOCUSATE SODIUM 100 MG: 100 CAPSULE ORAL at 08:09

## 2022-09-08 RX ADMIN — VANCOMYCIN HYDROCHLORIDE 750 MG: 750 INJECTION, SOLUTION INTRAVENOUS at 06:12

## 2022-09-08 RX ADMIN — DOCUSATE SODIUM 100 MG: 100 CAPSULE ORAL at 18:27

## 2022-09-08 RX ADMIN — LORAZEPAM 0.25 MG: 0.5 TABLET ORAL at 20:59

## 2022-09-08 NOTE — ASSESSMENT & PLAN NOTE
Suspect aspiration pneumonia  Patient had episode of fevers  Still having low-grade temperatures 99  Chest x-ray shows right sided persistent infiltrate  Currently on vanco and cefepime    MRSA nasal swab negative and hence discontinue vancomycin and continue cefepime alone    Consult placed to Infectious Disease

## 2022-09-08 NOTE — PROGRESS NOTES
Progress Note - Cardiology   Andrey Lam 56 y o  male MRN: [id number]  Unit/Bed#: -01 Encounter: [id number]    Assessment:  Acute on chronic HFrEF with R pleural effusion (previous thoracentesis was transudative)              - on torsemide 20 mg PO BID as an OP              - sp thoracentesis   - received lasix 10 mg/hr gtt and bumex IV x 1, still on lasix drip with soft BPs   - negative >3 L since admission  Sepsis with suspected PNA on IV abx   Ischemic cardiomyopathy- GDMT on hold for hypotension   CAD sp STEMI 4/10/22 sp PCI To LAD and PCI to prox circ  Apical akinesis on coumadin (on hold for thoracentesis, now resumed  PAD  Smoking  anxiety    Plan: 1  Continue  lasix drip at 10 mg/hr  2  kdur 40 meq PO BID  3  Recommend life vest given persistently low LVEF  Will need eventual evaluation for ICD  4  Monitor I and O, daily standing weights, renal function and electrolytes  BMP BID  5  Concerning regarding hypotension, may need pressor support, will avoid for now  To consider dobutamine if needed  To also consider advanced heart failure evaluation if needed  6  Treat underlying possible PNA  7  No further changes in regimen at this time    Subjective/Objective     Subjective: pt reports nausea and anxiety this morning  Objective: negative 3 8 L since admission  Renal function is improving  Still on lasix drip  bp soft  BB off  Catheter removed given leakage overnight       Vitals: BP 96/73 (BP Location: Left arm)   Pulse 94   Temp 97 8 °F (36 6 °C) (Temporal)   Resp 19   Ht 5' 5" (1 651 m)   Wt 71 9 kg (158 lb 8 2 oz)   SpO2 96%   BMI 26 38 kg/m²   Vitals:    09/07/22 0555 09/08/22 0547   Weight: 72 1 kg (158 lb 15 2 oz) 71 9 kg (158 lb 8 2 oz)     Orthostatic Blood Pressures    Flowsheet Row Most Recent Value   Blood Pressure 96/73 filed at 09/08/2022 0700   Patient Position - Orthostatic VS Lying filed at 09/08/2022 0700            Intake/Output Summary (Last 24 hours) at 9/8/2022 [facility] filed at 9/8/2022 0804  Gross per 24 hour   Intake 1366 05 ml   Output 2160 ml   Net -793 95 ml       Invasive Devices  Report    Peripheral Intravenous Line  Duration           Peripheral IV 09/05/22 Left Antecubital 3 days    Peripheral IV 09/06/22 Right;Upper;Ventral (anterior) Arm 1 day                      Physical Exam  Constitutional:       Appearance: Normal appearance  HENT:      Head: Normocephalic and atraumatic  Cardiovascular:      Rate and Rhythm: Normal rate  Heart sounds: No murmur heard  No gallop  Pulmonary:      Breath sounds: No wheezing  Comments: Decreased breath sounds in R lung diffusely  Abdominal:      Palpations: Abdomen is soft  Musculoskeletal:         General: Swelling present  Cervical back: Neck supple  Skin:     General: Skin is warm and dry  Capillary Refill: Capillary refill takes less than 2 seconds  Neurological:      General: No focal deficit present  Mental Status: He is alert and oriented to person, place, and time          Lab Results:   I have personally reviewed pertinent lab results      CBC with diff:   Results from last 7 days   Lab Units 09/08/22  0539   WBC Thousand/uL 9 32   RBC Million/uL 4 54   HEMOGLOBIN g/dL 10 2*   HEMATOCRIT % 34 8*   MCV fL 77*   MCH pg 22 5*   MCHC g/dL 29 3*   RDW % 19 9*   MPV fL 9 8   PLATELETS Thousands/uL 223     CMP:   Results from last 7 days   Lab Units 09/08/22  0539   SODIUM mmol/L 133*   CHLORIDE mmol/L 99   CO2 mmol/L 22   BUN mg/dL 33*   CREATININE mg/dL 1 25   CALCIUM mg/dL 8 4   AST U/L 88*   ALT U/L 91*   ALK PHOS U/L 273*   EGFR ml/min/1 73sq m 64     HS Troponin:   0   Lab Value Date/Time    HSTNI 225 (H) 09/07/2022 0130    HSTNI0 48 09/06/2022 1558    HSTNI2 86 (H) 09/06/2022 1729    HSTNI4 150 (H) 09/06/2022 2021    HSTNI4 15 08/06/2022 1728     BNP:   Results from last 7 days   Lab Units 09/08/22  0539   POTASSIUM mmol/L 4 5   CHLORIDE mmol/L 99   CO2 mmol/L 22   BUN mg/dL 33*   CREATININE mg/dL 1 25   CALCIUM mg/dL 8 4   EGFR ml/min/1 73sq m 64     Coags:   Results from last 7 days   Lab Units 09/08/22  0539   INR  2 01*     TSH:     Magnesium:   Results from last 7 days   Lab Units 09/08/22  0539   MAGNESIUM mg/dL 2 1     Lipid Profile:     Imaging: I have personally reviewed pertinent reports  Echo 9/6/22:       Left Ventricle: Definity echo enhancement utilized to improve endocardial border definition given inability to adequately visualize 2 contiguous endocardial border segments and for improved visualization of the LV apex  The left ventricle demonstrates normal wall thickness with enlarged LV cavity size is severely reduced LV function  Estimated ejection fraction is 10-15%  There is severe global hypokinesis with akinesis involving the septum, anterior septum, and apex  Diastolic assessment was not performed    Right Ventricle: The right ventricle appears enlarged with reduced function    Mitral Valve: Mild thickening of the mitral leaflets with preserved excursion and moderate central insufficiency  Mitral regurgitation likely functional given enlarged LV cavity dimensions    Tricuspid Valve: Grossly normal-appearing tricuspid leaflets with mild regurgitation  Increased estimated pulmonary pressure of 56 mmHg    Aorta: The aortic root is normal in size  The ascending aorta is mildly dilated      Compared to prior study performed April 3690, LV systolic function was reduced with the ejection fraction at that time estimated in the range of 25-29%  Hypokinesis described globally but also involving the mid to distal anterior, septal, lateral, apical segments    The right ventricle was described as having normal size with mildly reduced RV function          EKG:   Narrative & Impression   Normal sinus rhythm  Possible Left atrial enlargement  Rightward axis  Septal infarct (cited on or before 22-AUG-2022)  Nonspecific ST abnormality  Prolonged QT  Abnormal ECG  When compared with ECG of 22-AUG-2022 03:37,  Premature ventricular complexes are no longer Present  T wave inversion more evident in Inferior leads  Confirmed by Lili Trevino ([id number]) on 9/5/2022 10:19:14 PM

## 2022-09-08 NOTE — PROGRESS NOTES
Vancomycin Assessment    Abhilash Edmonds is a 56 y o  male who is currently receiving vancomycin 750 mg iv q 12 hrs for Pneumonia     Relevant clinical data and objective history reviewed:  Creatinine   Date Value Ref Range Status   09/08/2022 1 25 0 60 - 1 30 mg/dL Final     Comment:     Standardized to IDMS reference method   09/07/2022 1 35 (H) 0 60 - 1 30 mg/dL Final     Comment:     Standardized to IDMS reference method   09/07/2022 1 28 0 60 - 1 30 mg/dL Final     Comment:     Standardized to IDMS reference method   09/07/2022 1 28 0 60 - 1 30 mg/dL Final     Comment:     Standardized to IDMS reference method     BP 96/73 (BP Location: Left arm)   Pulse 94   Temp 97 8 °F (36 6 °C) (Temporal)   Resp 19   Ht 5' 5" (1 651 m)   Wt 71 9 kg (158 lb 8 2 oz)   SpO2 96%   BMI 26 38 kg/m²   I/O last 3 completed shifts: In: 1431 4 [P O :840; I V :36 4; IV Piggyback:555]  Out: 4131 [Urine:3205; Emesis/NG output:100]  Lab Results   Component Value Date/Time    BUN 33 (H) 09/08/2022 05:39 AM    WBC 9 32 09/08/2022 05:39 AM    HGB 10 2 (L) 09/08/2022 05:39 AM    HCT 34 8 (L) 09/08/2022 05:39 AM    MCV 77 (L) 09/08/2022 05:39 AM     09/08/2022 05:39 AM     Temp Readings from Last 3 Encounters:   09/08/22 97 8 °F (36 6 °C) (Temporal)   08/22/22 (!) 97 °F (36 1 °C) (Temporal)   08/11/22 98 1 °F (36 7 °C)     Vancomycin Days of Therapy: 3    Assessment/Plan  The patient is currently on vancomycin utilizing scheduled dosing  Baseline risks associated with therapy include: pre-existing renal impairment and concomitant nephrotoxic medications  The patient is receiving 750 mg iv q 12 hrs with the most recent vancomycin level being at steady-state and sub-therapeutic based on a goal of 15-20 (appropriate for most indications) ; therefore, after clinical evaluation will be changed to 1000 mg iv q 12 hrs     Pharmacy will continue to follow closely for s/sx of nephrotoxicity, infusion reactions, and appropriateness of therapy  BMP and CBC will be ordered per protocol  Plan for trough as patient approaches steady state, prior to the 4th  dose at approximately 1430 on 9/9/22  Pharmacy will continue to follow the patients culture results and clinical progress daily      Cinda Mejia, Pharmacist

## 2022-09-08 NOTE — PROGRESS NOTES
[address]  Progress Note - Giselle Prow 1965, 56 y o  male MRN: [id number]  Unit/Bed#: -01 Encounter: [id number]  Primary Care Provider: Shay Hill DO   Date and time admitted to hospital: 9/5/2022  8:01 AM  * Acute on chronic systolic CHF (congestive heart failure) ([address] )  Assessment & Plan  Wt Readings from Last 3 Encounters:   09/06/22 75 3 kg (166 lb)   08/22/22 70 6 kg (155 lb 10 3 oz)   08/11/22 65 6 kg (144 lb 9 6 oz)     Presents to ED with shortness of breath and leg swelling   Has history of heart failure with multiple hospitalizations - recently discharged 08/11, Home regimen torsemide 20 mg b i d   o Has been taking as prescribed except recently has been increasing dose as he feels it was not working, last 2 days prior to admission has not taken any doses as he feels that has not been working  [facility] ED diuresed with 80 mg IV Lasix     CXR pending official read, pulmonary congestion and pleural effusion   BNP 3,120   EKG normal sinus rhythm with left atrial enlargement, rate 99, QTC of prolonged   Monitor on tele due to concurrent electrolyte abnormalities   ECHO April of 2022 revealed EF 25%, severe apical akinesis   o EF now 10-15% - cardiology having discussion of ICD with patient      Continue diuresis with Lasix drip 10 mg/hr   Continue to monitor electrolytes and replete as needed   Daily weights  Previously patient was discharged with a weight of 144 lb    Still 10 lb above that weight   Strict I & Os   Cardiac diet, low sodium <2g, fluid restriction <1500   Cardiology consult due to readmission, recommendation appreciated    Pleural effusion, right  Assessment & Plan  · CTA PE study showing pulmonary edema with large right pleural effusion with subjacent compressive atelectasis  · Continue IV diuresis with Lasix drip   · Status post rt thoracentesis for 1 2 L 9/6      Right lower lobe pneumonia  Assessment & Plan  Suspect aspiration pneumonia  Patient had episode of fevers  Still having low-grade temperatures 99  Chest x-ray shows right sided persistent infiltrate  Currently on vanco and cefepime    MRSA nasal swab negative and hence discontinue vancomycin and continue cefepime alone  Consult placed to Infectious Disease    LOREE (acute kidney injury) ([facility] [address] )  Assessment & Plan  Baseline 0 8-1  Elevated today to 1 25 likely in setting of diuresis   Need diuresis so will monitor closely     Hypokalemia  Assessment & Plan  · 2 7 on admission - supplemented with 40 IV and 40 oral on admission  · Continue supplementation  · Recheck this AM is 3 1 - continue 40 mg BID oral     Tobacco abuse  Assessment & Plan  · Patient notes he quit yesterday  · Encourage continued cessation-patient has multiple quit dates  · Pulmonary follow-up for formal PFTs due to chronic nature of shortness of breath with activity    Chronic anticoagulation  Assessment & Plan  · Started on Coumadin to prevent thrombosis due to apical akinesis after recent cardiac catheterization  · INR is 2    PAD (peripheral artery disease) ([facility] [address] )  Assessment & Plan  Underwent aorto-bifemoral bypass artery and reports chronic leg pains but this is unchanged         VTE Pharmacologic Prophylaxis: VTE Score: 3 Moderate Risk (Score 3-4) - Pharmacological DVT Prophylaxis Ordered: warfarin (Coumadin)  Patient Centered Rounds: I performed bedside rounds with nursing staff today  Discussions with Specialists or Other Care Team Provider:  Discussed with Cardiology    Education and Discussions with Family / Patient: Updated  (mother) at bedside  Time Spent for Care: 45 minutes  More than 50% of total time spent on counseling and coordination of care as described above      Current Length of Stay: 3 day(s)  Current Patient Status: Inpatient   Certification Statement: The patient will continue to require additional inpatient hospital stay due to Acute on chronic systolic CHF exacerbation  Discharge Plan: Anticipate discharge in 48-72 hrs to home  Code Status: Level 1 - Full Code    Subjective:   Patient denies any chest pain but is complaining of shortness breath and also complaining of leg swelling    Objective:     Vitals:   Temp (24hrs), Av 1 °F (37 3 °C), Min:97 5 °F (36 4 °C), Max:101 3 °F (38 5 °C)    Temp:  [97 5 °F (36 4 °C)-101 3 °F (38 5 °C)] 97 6 °F (36 4 °C)  HR:  [] 98  Resp:  [17-29] 21  BP: ()/(69-89) 106/76  SpO2:  [95 %-100 %] 96 %  Body mass index is 26 38 kg/m²  Input and Output Summary (last 24 hours): Intake/Output Summary (Last 24 hours) at 2022 1335  Last data filed at 2022 1242  Gross per 24 hour   Intake 1484 ml   Output 2260 ml   Net -776 ml       Physical Exam:   Physical Exam  Vitals and nursing note reviewed  Constitutional:       Appearance: He is ill-appearing  HENT:      Head: Normocephalic and atraumatic  Right Ear: External ear normal       Left Ear: External ear normal       Nose: Nose normal       Mouth/Throat:      Pharynx: Oropharynx is clear  Eyes:      Pupils: Pupils are equal, round, and reactive to light  Cardiovascular:      Rate and Rhythm: Normal rate and regular rhythm  Heart sounds: Normal heart sounds  Pulmonary:      Effort: Pulmonary effort is normal       Breath sounds: Rales present  Abdominal:      General: Bowel sounds are normal       Palpations: Abdomen is soft  Tenderness: There is no abdominal tenderness  Musculoskeletal:         General: Normal range of motion  Cervical back: Normal range of motion and neck supple  Right lower leg: Edema present  Left lower leg: Edema present  Skin:     General: Skin is warm and dry  Capillary Refill: Capillary refill takes less than 2 seconds  Neurological:      General: No focal deficit present  Mental Status: He is alert and oriented to person, place, and time     Psychiatric:         Mood and Affect: Mood normal             Additional Data:     Labs:  Results from last 7 days   Lab Units 09/08/22  0539 09/06/22  0436 09/05/22  0815   WBC Thousand/uL 9 32   < > 6 37   HEMOGLOBIN g/dL 10 2*   < > 11 1*   HEMATOCRIT % 34 8*   < > 38 6   PLATELETS Thousands/uL 223   < > 293   NEUTROS PCT %  --   --  64   LYMPHS PCT %  --   --  25   MONOS PCT %  --   --  8   EOS PCT %  --   --  1    < > = values in this interval not displayed  Results from last 7 days   Lab Units 09/08/22  0539   SODIUM mmol/L 133*   POTASSIUM mmol/L 4 5   CHLORIDE mmol/L 99   CO2 mmol/L 22   BUN mg/dL 33*   CREATININE mg/dL 1 25   ANION GAP mmol/L 12   CALCIUM mg/dL 8 4   ALBUMIN g/dL 2 6*   TOTAL BILIRUBIN mg/dL 2 14*   ALK PHOS U/L 273*   ALT U/L 91*   AST U/L 88*   GLUCOSE RANDOM mg/dL 118     Results from last 7 days   Lab Units 09/08/22  0539   INR  2 01*             Results from last 7 days   Lab Units 09/06/22  2055 09/06/22  1901 09/06/22  1731 09/06/22  1623   LACTIC ACID mmol/L 3 5* 6 5*  --  9 3*   PROCALCITONIN ng/ml  --   --  0 82*  --        Lines/Drains:  Invasive Devices  Report    Peripheral Intravenous Line  Duration           Peripheral IV 09/05/22 Left Antecubital 3 days    Peripheral IV 09/06/22 Right;Upper;Ventral (anterior) Arm 1 day                      Imaging: Reviewed radiology reports from this admission including: chest xray    Recent Cultures (last 7 days):   Results from last 7 days   Lab Units 09/07/22  0541 09/06/22  1749 09/06/22  1730 09/06/22  1727   BLOOD CULTURE   --   --  No Growth at 24 hrs  No Growth at 24 hrs     SPUTUM CULTURE  2+ Growth of   --   --   --    GRAM STAIN RESULT  2+ Epithelial cells per low power field*  No polys seen*  1+ Gram positive cocci in pairs*  --   --   --    LEGIONELLA URINARY ANTIGEN   --  Negative  --   --        Last 24 Hours Medication List:   Current Facility-Administered Medications   Medication Dose Route Frequency Provider Last Rate    acetaminophen  650 mg Oral Q4H PRN Mau Abdi, CRNP      atorvastatin  80 mg Oral Daily Noa Pereira, CRNP      cefepime  2,000 mg Intravenous Q12H Noa Pereira, CRNP Stopped (09/08/22 0612)    clopidogrel  75 mg Oral Daily Noa Pereira, CRNP      dextromethorphan-guaiFENesin  10 mL Oral Q4H PRN Noa Pereira, CRNP      docusate sodium  100 mg Oral BID Noa Pereira, CRNP      furosemide  10 mg/hr Intravenous Continuous Noa Pereira, CRNP 10 mg/hr (09/07/22 0905)    gabapentin  600 mg Oral Daily PRN Noa Pereira, CRNP      HYDROmorphone  0 1 mg Intravenous Q4H PRN Noa Pereira, CRNP      LORazepam  0 25 mg Oral Q8H PRN Noa Pereira, CRNP      melatonin  6 mg Oral HS Noa Pereira, CRNP      nicotine  1 patch Transdermal Daily Noa Pereira, CRNP      ondansetron  4 mg Intravenous Q6H PRN Noa Pereira, CRNP      oxyCODONE  2 5 mg Oral Q6H PRN Noa Pereira, CRNP      polyethylene glycol  17 g Oral Daily PRN Noa Pereira, CRNP      potassium chloride  40 mEq Oral BID With Meals Noa Pereira, CRNP      warfarin  2 5 mg Oral Daily (warfarin) Noa Pereira, CRNP          Today, Patient Was Seen By: Gini Posadas MD    **Please Note: This note may have been constructed using a voice recognition system  **

## 2022-09-08 NOTE — PLAN OF CARE
Problem: Potential for Falls  Goal: Patient will remain free of falls  Description: INTERVENTIONS:  - Educate patient/family on patient safety including physical limitations  - Instruct patient to call for assistance with activity   - Consult OT/PT to assist with strengthening/mobility   - Keep Call bell within reach  - Keep bed low and locked with side rails adjusted as appropriate  - Keep care items and personal belongings within reach  - Initiate and maintain comfort rounds  - Make Fall Risk Sign visible to staff  - Offer Toileting every 2 Hours, in advance of need  - Initiate/Maintain bed alarm  - Obtain necessary fall risk management equipment: yellow socks, walker  - Apply yellow socks and bracelet for high fall risk patients  - Consider moving patient to room near nurses station  Outcome: Progressing     Problem: PAIN - ADULT  Goal: Verbalizes/displays adequate comfort level or baseline comfort level  Description: Interventions:  - Encourage patient to monitor pain and request assistance  - Assess pain using appropriate pain scale  - Administer analgesics based on type and severity of pain and evaluate response  - Implement non-pharmacological measures as appropriate and evaluate response  - Consider cultural and social influences on pain and pain management  - Notify physician/advanced practitioner if interventions unsuccessful or patient reports new pain  Outcome: Progressing     Problem: INFECTION - ADULT  Goal: Absence or prevention of progression during hospitalization  Description: INTERVENTIONS:  - Assess and monitor for signs and symptoms of infection  - Monitor lab/diagnostic results  - Monitor all insertion sites, i e  indwelling lines, tubes, and drains  - Monitor endotracheal if appropriate and nasal secretions for changes in amount and color  - [place] appropriate cooling/warming therapies per order  - Administer medications as ordered  - Instruct and encourage patient and family to use good hand hygiene technique  - Identify and instruct in appropriate isolation precautions for identified infection/condition  Outcome: Progressing  Goal: Absence of fever/infection during neutropenic period  Description: INTERVENTIONS:  - Monitor WBC    Outcome: Progressing     Problem: SAFETY ADULT  Goal: Patient will remain free of falls  Description: INTERVENTIONS:  - Educate patient/family on patient safety including physical limitations  - Instruct patient to call for assistance with activity   - Consult OT/PT to assist with strengthening/mobility   - Keep Call bell within reach  - Keep bed low and locked with side rails adjusted as appropriate  - Keep care items and personal belongings within reach  - Initiate and maintain comfort rounds  - Make Fall Risk Sign visible to staff  - Offer Toileting every 2 Hours, in advance of need  - Initiate/Maintain bed alarm  - Obtain necessary fall risk management equipment: yellow socks, walker  - Apply yellow socks and bracelet for high fall risk patients  - Consider moving patient to room near nurses station  Outcome: Progressing  Goal: Maintain or return to baseline ADL function  Description: INTERVENTIONS:  - Educate patient/family on patient safety including physical limitations  - Instruct patient to call for assistance with activity   - Consult OT/PT to assist with strengthening/mobility   - Keep Call bell within reach  - Keep bed low and locked with side rails adjusted as appropriate  - Keep care items and personal belongings within reach  - Initiate and maintain comfort rounds  - Make Fall Risk Sign visible to staff  - Offer Toileting every 2 Hours, in advance of need  - Initiate/Maintain bed alarm  - Obtain necessary fall risk management equipment: yellow socks, walker  - Apply yellow socks and bracelet for high fall risk patients  - Consider moving patient to room near nurses station  Outcome: Progressing  Goal: Maintains/Returns to pre admission functional level  Description: INTERVENTIONS:  - Perform BMAT or MOVE assessment daily    - Set and communicate daily mobility goal to care team and patient/family/caregiver  - Collaborate with rehabilitation services on mobility goals if consulted  - Perform Range of Motion 2 times a day  - Reposition patient every 2 hours  - Dangle patient 2 times a day  - Stand patient 2 times a day  - Ambulate patient 2 times a day  - Out of bed to chair 2 times a day   - Out of bed for meals 2  Problem: DISCHARGE PLANNING  Goal: Discharge to home or other facility with appropriate resources  Description: INTERVENTIONS:  - Identify barriers to discharge w/patient and caregiver  - Arrange for needed discharge resources and transportation as appropriate  - Identify discharge learning needs (meds, wound care, etc )  - Arrange for interpretive services to assist at discharge as needed  - Refer to Case Management Department for coordinating discharge planning if the patient needs post-hospital services based on physician/advanced practitioner order or complex needs related to functional status, cognitive ability, or social support system  Outcome: Progressing     Problem: Knowledge Deficit  Goal: Patient/family/caregiver demonstrates understanding of disease process, treatment plan, medications, and discharge instructions  Description: Complete learning assessment and assess knowledge base    Interventions:  - Provide teaching at level of understanding  - Provide teaching via preferred learning methods  Outcome: Progressing     Problem: CARDIOVASCULAR - ADULT  Goal: Maintains optimal cardiac output and hemodynamic stability  Description: INTERVENTIONS:  - Monitor I/O, vital signs and rhythm  - Monitor for S/S and trends of decreased cardiac output  - Administer and titrate ordered vasoactive medications to optimize hemodynamic stability  - Assess quality of pulses, skin color and temperature  - Assess for signs of decreased coronary artery perfusion  - Instruct patient to report change in severity of symptoms  Outcome: Progressing  Goal: Absence of cardiac dysrhythmias or at baseline rhythm  Description: INTERVENTIONS:  - Continuous cardiac monitoring, vital signs, obtain 12 lead EKG if ordered  - Administer antiarrhythmic and heart rate control medications as ordered  - Monitor electrolytes and administer replacement therapy as ordered  Outcome: Progressing     Problem: HEMATOLOGIC - ADULT  Goal: Maintains hematologic stability  Description: INTERVENTIONS  - Assess for signs and symptoms of bleeding or hemorrhage  - Monitor labs  - Administer supportive blood products/factors as ordered and appropriate  Outcome: Progressing     Problem: MOBILITY - ADULT  Goal: Maintain or return to baseline ADL function  Description: INTERVENTIONS:  - Educate patient/family on patient safety including physical limitations  - Instruct patient to call for assistance with activity   - Consult OT/PT to assist with strengthening/mobility   - Keep Call bell within reach  - Keep bed low and locked with side rails adjusted as appropriate  - Keep care items and personal belongings within reach  - Initiate and maintain comfort rounds  - Make Fall Risk Sign visible to staff  - Offer Toileting every 2 Hours, in advance of need  - Initiate/Maintain bed alarm  - Obtain necessary fall risk management equipment: yellow socks, walker  - Apply yellow socks and bracelet for high fall risk patients  - Consider moving patient to room near nurses station  Outcome: Progressing  Goal: Maintains/Returns to pre admission functional level  Description: INTERVENTIONS:  - Perform BMAT or MOVE assessment daily    - Set and communicate daily mobility goal to care team and patient/family/caregiver  - Collaborate with rehabilitation services on mobility goals if consulted  - Perform Range of Motion 2 times a day  - Reposition patient every 2 hours    - Dangle patient 2 times a day  - Stand patient 2 times a day  - Ambulate patient 2 times a day  - Out of bed to chair 2 times a day   - Out of bed for meals 2 times a day  - Out of bed for toileting  - Record patient progress and toleration of activity level   Outcome: Progressing    times a day  - Out of bed for toileting  - Record patient progress and toleration of activity level   Outcome: Progressing     Problem: DISCHARGE PLANNING  Goal: Discharge to home or other facility with appropriate resources  Description: INTERVENTIONS:  - Identify barriers to discharge w/patient and caregiver  - Arrange for needed discharge resources and transportation as appropriate  - Identify discharge learning needs (meds, wound care, etc )  - Arrange for interpretive services to assist at discharge as needed  - Refer to Case Management Department for coordinating discharge planning if the patient needs post-hospital services based on physician/advanced practitioner order or complex needs related to functional status, cognitive ability, or social support system  Outcome: Progressing

## 2022-09-09 VITALS
WEIGHT: 162.04 LBS | RESPIRATION RATE: 27 BRPM | TEMPERATURE: 97.6 F | BODY MASS INDEX: 27 KG/M2 | DIASTOLIC BLOOD PRESSURE: 79 MMHG | HEIGHT: 65 IN | OXYGEN SATURATION: 100 % | SYSTOLIC BLOOD PRESSURE: 130 MMHG | HEART RATE: 101 BPM

## 2022-09-09 LAB
ANION GAP SERPL CALCULATED.3IONS-SCNC: 12 MMOL/L (ref 4–13)
BACTERIA SPT RESP CULT: ABNORMAL
BUN SERPL-MCNC: 28 MG/DL (ref 5–25)
CALCIUM SERPL-MCNC: 8.3 MG/DL (ref 8.3–10.1)
CHLORIDE SERPL-SCNC: 97 MMOL/L (ref 96–108)
CO2 SERPL-SCNC: 22 MMOL/L (ref 21–32)
CREAT SERPL-MCNC: 1.14 MG/DL (ref 0.6–1.3)
GFR SERPL CREATININE-BSD FRML MDRD: 71 ML/MIN/1.73SQ M
GLUCOSE SERPL-MCNC: 102 MG/DL (ref 65–140)
GRAM STN SPEC: ABNORMAL
INR PPP: 2.56 (ref 0.84–1.19)
LACTATE SERPL-SCNC: 5 MMOL/L (ref 0.5–2)
LACTATE SERPL-SCNC: 5.4 MMOL/L (ref 0.5–2)
POTASSIUM SERPL-SCNC: 4.6 MMOL/L (ref 3.5–5.3)
PROTHROMBIN TIME: 27.6 SECONDS (ref 11.6–14.5)
SODIUM SERPL-SCNC: 131 MMOL/L (ref 135–147)

## 2022-09-09 PROCEDURE — 80048 BASIC METABOLIC PNL TOTAL CA: CPT | Performed by: FAMILY MEDICINE

## 2022-09-09 PROCEDURE — 99239 HOSP IP/OBS DSCHRG MGMT >30: CPT | Performed by: FAMILY MEDICINE

## 2022-09-09 PROCEDURE — G0427 INPT/ED TELECONSULT70: HCPCS | Performed by: INTERNAL MEDICINE

## 2022-09-09 PROCEDURE — 85610 PROTHROMBIN TIME: CPT | Performed by: NURSE PRACTITIONER

## 2022-09-09 PROCEDURE — 83605 ASSAY OF LACTIC ACID: CPT | Performed by: PHYSICIAN ASSISTANT

## 2022-09-09 PROCEDURE — NC001 PR NO CHARGE: Performed by: FAMILY MEDICINE

## 2022-09-09 RX ORDER — LORAZEPAM 0.5 MG/1
0.25 TABLET ORAL EVERY 8 HOURS PRN
Status: CANCELLED | OUTPATIENT
Start: 2022-09-09

## 2022-09-09 RX ORDER — MILRINONE LACTATE 0.2 MG/ML
0.25 INJECTION, SOLUTION INTRAVENOUS CONTINUOUS
Status: CANCELLED | OUTPATIENT
Start: 2022-09-09

## 2022-09-09 RX ORDER — OXYCODONE HYDROCHLORIDE 5 MG/1
2.5 TABLET ORAL EVERY 6 HOURS PRN
Status: CANCELLED | OUTPATIENT
Start: 2022-09-09

## 2022-09-09 RX ORDER — CEFTRIAXONE 2 G/50ML
2000 INJECTION, SOLUTION INTRAVENOUS EVERY 24 HOURS
Status: CANCELLED | OUTPATIENT
Start: 2022-09-09

## 2022-09-09 RX ORDER — LANOLIN ALCOHOL/MO/W.PET/CERES
6 CREAM (GRAM) TOPICAL
Status: CANCELLED | OUTPATIENT
Start: 2022-09-09 | End: 2022-09-10

## 2022-09-09 RX ORDER — FUROSEMIDE 10 MG/ML
15 SYRINGE (ML) INJECTION CONTINUOUS
Status: CANCELLED | OUTPATIENT
Start: 2022-09-09

## 2022-09-09 RX ORDER — WARFARIN SODIUM 2.5 MG/1
2.5 TABLET ORAL
Status: CANCELLED | OUTPATIENT
Start: 2022-09-09

## 2022-09-09 RX ORDER — GABAPENTIN 300 MG/1
600 CAPSULE ORAL DAILY PRN
Status: CANCELLED | OUTPATIENT
Start: 2022-09-09

## 2022-09-09 RX ORDER — MILRINONE LACTATE 0.2 MG/ML
0.25 INJECTION, SOLUTION INTRAVENOUS CONTINUOUS
Status: DISCONTINUED | OUTPATIENT
Start: 2022-09-09 | End: 2022-09-09 | Stop reason: HOSPADM

## 2022-09-09 RX ORDER — CEFEPIME HYDROCHLORIDE 2 G/50ML
2000 INJECTION, SOLUTION INTRAVENOUS EVERY 12 HOURS
Status: CANCELLED | OUTPATIENT
Start: 2022-09-09

## 2022-09-09 RX ORDER — POTASSIUM CHLORIDE 20 MEQ/1
40 TABLET, EXTENDED RELEASE ORAL 2 TIMES DAILY WITH MEALS
Status: CANCELLED | OUTPATIENT
Start: 2022-09-09

## 2022-09-09 RX ORDER — ONDANSETRON 2 MG/ML
4 INJECTION INTRAMUSCULAR; INTRAVENOUS EVERY 6 HOURS PRN
Status: CANCELLED | OUTPATIENT
Start: 2022-09-09

## 2022-09-09 RX ORDER — LORAZEPAM 0.5 MG/1
0.25 TABLET ORAL ONCE
Status: COMPLETED | OUTPATIENT
Start: 2022-09-09 | End: 2022-09-09

## 2022-09-09 RX ORDER — DOCUSATE SODIUM 100 MG/1
100 CAPSULE, LIQUID FILLED ORAL 2 TIMES DAILY
Status: CANCELLED | OUTPATIENT
Start: 2022-09-09

## 2022-09-09 RX ORDER — ATORVASTATIN CALCIUM 40 MG/1
80 TABLET, FILM COATED ORAL DAILY
Status: CANCELLED | OUTPATIENT
Start: 2022-09-10

## 2022-09-09 RX ORDER — POLYETHYLENE GLYCOL 3350 17 G/17G
17 POWDER, FOR SOLUTION ORAL DAILY PRN
Status: CANCELLED | OUTPATIENT
Start: 2022-09-09

## 2022-09-09 RX ORDER — CLOPIDOGREL BISULFATE 75 MG/1
75 TABLET ORAL DAILY
Status: CANCELLED | OUTPATIENT
Start: 2022-09-10

## 2022-09-09 RX ORDER — CEFTRIAXONE 2 G/50ML
2000 INJECTION, SOLUTION INTRAVENOUS EVERY 24 HOURS
Status: DISCONTINUED | OUTPATIENT
Start: 2022-09-09 | End: 2022-09-09 | Stop reason: HOSPADM

## 2022-09-09 RX ORDER — ACETAMINOPHEN 325 MG/1
650 TABLET ORAL EVERY 4 HOURS PRN
Status: CANCELLED | OUTPATIENT
Start: 2022-09-09

## 2022-09-09 RX ADMIN — LORAZEPAM 0.25 MG: 0.5 TABLET ORAL at 16:59

## 2022-09-09 RX ADMIN — HYDROMORPHONE HYDROCHLORIDE 0.1 MG: 0.2 INJECTION, SOLUTION INTRAMUSCULAR; INTRAVENOUS; SUBCUTANEOUS at 00:24

## 2022-09-09 RX ADMIN — CLOPIDOGREL BISULFATE 75 MG: 75 TABLET ORAL at 08:10

## 2022-09-09 RX ADMIN — MILRINONE LACTATE IN DEXTROSE 0.25 MCG/KG/MIN: 200 INJECTION, SOLUTION INTRAVENOUS at 14:03

## 2022-09-09 RX ADMIN — POTASSIUM CHLORIDE 40 MEQ: 1500 TABLET, EXTENDED RELEASE ORAL at 15:38

## 2022-09-09 RX ADMIN — Medication 10 MG/HR: at 08:29

## 2022-09-09 RX ADMIN — POTASSIUM CHLORIDE 40 MEQ: 1500 TABLET, EXTENDED RELEASE ORAL at 08:10

## 2022-09-09 RX ADMIN — LORAZEPAM 0.25 MG: 0.5 TABLET ORAL at 08:13

## 2022-09-09 RX ADMIN — CEFEPIME HYDROCHLORIDE 2000 MG: 2 INJECTION, SOLUTION INTRAVENOUS at 05:58

## 2022-09-09 RX ADMIN — ATORVASTATIN CALCIUM 80 MG: 40 TABLET, FILM COATED ORAL at 08:10

## 2022-09-09 RX ADMIN — CEFTRIAXONE 2000 MG: 2 INJECTION, SOLUTION INTRAVENOUS at 14:41

## 2022-09-09 NOTE — PROGRESS NOTES
[address]  Progress Note - Johan Hung 1965, 56 y o  male MRN: [id number]  Unit/Bed#: -01 Encounter: [id number]  Primary Care Provider: Katlyn Torrez DO   Date and time admitted to hospital: 9/5/2022  8:01 AM    * Acute on chronic systolic CHF (congestive heart failure) ([facility] [address] )  Assessment & Plan  Wt Readings from Last 3 Encounters:   09/09/22 73 5 kg (162 lb 0 6 oz)   08/22/22 70 6 kg (155 lb 10 3 oz)   08/11/22 65 6 kg (144 lb 9 6 oz)     Presents to ED with shortness of breath and leg swelling   Has history of heart failure with multiple hospitalizations - recently discharged 08/11, Home regimen torsemide 20 mg b i d   o Has been taking as prescribed except recently has been increasing dose as he feels it was not working, last 2 days prior to admission has not taken any doses as he feels that has not been working  [employer] ED diuresed with 80 mg IV Lasix     CXR pending official read, pulmonary congestion and pleural effusion   BNP 3,120   EKG normal sinus rhythm with left atrial enlargement, rate 99, QTC of prolonged   ECHO April of 2022 revealed EF 25%, severe apical akinesis   o EF now 10-15% - cardiology having discussion of ICD with patient      Continue diuresis with Lasix drip but increase to 15mg/hr as patient not diuresing as much as required  Still feels short of breath at rest and with minimal exertion   Continue to monitor electrolytes and replete as needed   Daily weights  Previously patient was discharged with a weight of 144 lb    Currently at 162 lb   Strict I & Os   Cardiac diet, low sodium <2g, fluid restriction <1500   Cardiology recommendation appreciated    Pleural effusion, right  Assessment & Plan  · CTA PE study showing pulmonary edema with large right pleural effusion with subjacent compressive atelectasis  · Continue IV diuresis with Lasix drip   · Status post rt thoracentesis for 1 2 L 9/6      Right lower lobe pneumonia  Assessment & Plan  Suspect aspiration pneumonia  Initially patient was spiking fevers but currently afebrile for the last 24 hours  Chest x-ray shows right sided persistent infiltrate  initially on vanco and cefepime    MRSA nasal swab negative and hence discontinue vancomycin and continue cefepime alone  Consult placed to Infectious Disease    LOREE (acute kidney injury) ([facility] [address] )  Assessment & Plan  Baseline 0 8-1  Elevated today to 1 1 likely in setting of diuresis   Need diuresis so will monitor closely     Tobacco abuse  Assessment & Plan  · Patient notes he quit yesterday  · Encourage continued cessation-patient has multiple quit dates  · Pulmonary follow-up for formal PFTs due to chronic nature of shortness of breath with activity    Chronic anticoagulation  Assessment & Plan  · Started on Coumadin to prevent thrombosis due to apical akinesis after recent cardiac catheterization  · INR is 2    PAD (peripheral artery disease) ([facility])  Assessment & Plan  Underwent aorto-bifemoral bypass artery and reports chronic leg pains but this is unchanged         VTE Pharmacologic Prophylaxis:   Pharmacologic: Warfarin (Coumadin)  Mechanical VTE Prophylaxis in Place: Yes    Patient Centered Rounds: I have performed bedside rounds with nursing staff today  Discussions with Specialists or Other Care Team Provider:  Discussed with Cardiology    Education and Discussions with Family / Patient:  Discussed with patient at bedside and will update mother    Time Spent for Care: 30 minutes  More than 50% of total time spent on counseling and coordination of care as described above  Current Length of Stay: 4 day(s)    Current Patient Status: Inpatient   Certification Statement: The patient will continue to require additional inpatient hospital stay due to Acute on chronic systolic CHF exacerbation    Discharge Plan:  Downgrade to Med surge with telemetry    Will probably be in the hospital at least another 72 hours    Code Status: Level 1 - Full Code      Subjective:   Patient complains of shortness of breath with minimal exertion  Fortunately no fevers in the last 24 hours  He is wondering how he can help his heart improve    Objective:     Vitals:   Temp (24hrs), Av 8 °F (36 6 °C), Min:97 4 °F (36 3 °C), Max:98 1 °F (36 7 °C)    Temp:  [97 4 °F (36 3 °C)-98 1 °F (36 7 °C)] 97 8 °F (36 6 °C)  HR:  [] 98  Resp:  [10-29] 24  BP: ()/(71-88) 99/71  SpO2:  [94 %-100 %] 99 %  Body mass index is 26 96 kg/m²  Input and Output Summary (last 24 hours): Intake/Output Summary (Last 24 hours) at 2022 1025  Last data filed at 2022 0801  Gross per 24 hour   Intake 759 98 ml   Output 1450 ml   Net -690 02 ml       Physical Exam:     Physical Exam  Vitals and nursing note reviewed  Constitutional:       Appearance: He is ill-appearing  HENT:      Head: Normocephalic and atraumatic  Right Ear: External ear normal       Left Ear: External ear normal       Nose: Nose normal       Mouth/Throat:      Pharynx: Oropharynx is clear  Eyes:      Pupils: Pupils are equal, round, and reactive to light  Cardiovascular:      Rate and Rhythm: Normal rate and regular rhythm  Heart sounds: Normal heart sounds  Pulmonary:      Effort: Pulmonary effort is normal       Comments: Moderate air entry bilaterally with mild decreased breath sounds bilateral bases  Abdominal:      General: Bowel sounds are normal       Palpations: Abdomen is soft  Tenderness: There is no abdominal tenderness  Musculoskeletal:         General: Normal range of motion  Cervical back: Normal range of motion and neck supple  Right lower leg: Edema present  Left lower leg: Edema present  Skin:     General: Skin is warm and dry  Capillary Refill: Capillary refill takes less than 2 seconds  Neurological:      General: No focal deficit present  Mental Status: He is alert and oriented to person, place, and time     Psychiatric: Mood and Affect: Mood normal            Additional Data:     Labs:    Results from last 7 days   Lab Units 09/08/22  0539 09/06/22  0436 09/05/22  0815   WBC Thousand/uL 9 32   < > 6 37   HEMOGLOBIN g/dL 10 2*   < > 11 1*   HEMATOCRIT % 34 8*   < > 38 6   PLATELETS Thousands/uL 223   < > 293   NEUTROS PCT %  --   --  64   LYMPHS PCT %  --   --  25   MONOS PCT %  --   --  8   EOS PCT %  --   --  1    < > = values in this interval not displayed  Results from last 7 days   Lab Units 09/09/22  0446 09/08/22  0539   SODIUM mmol/L 131* 133*   POTASSIUM mmol/L 4 6 4 5   CHLORIDE mmol/L 97 99   CO2 mmol/L 22 22   BUN mg/dL 28* 33*   CREATININE mg/dL 1 14 1 25   ANION GAP mmol/L 12 12   CALCIUM mg/dL 8 3 8 4   ALBUMIN g/dL  --  2 6*   TOTAL BILIRUBIN mg/dL  --  2 14*   ALK PHOS U/L  --  273*   ALT U/L  --  91*   AST U/L  --  88*   GLUCOSE RANDOM mg/dL 102 118     Results from last 7 days   Lab Units 09/09/22  0446   INR  2 56*             Results from last 7 days   Lab Units 09/06/22  2055 09/06/22  1901 09/06/22  1731 09/06/22  1623   LACTIC ACID mmol/L 3 5* 6 5*  --  9 3*   PROCALCITONIN ng/ml  --   --  0 82*  --            * I Have Reviewed All Lab Data Listed Above  * Additional Pertinent Lab Tests Reviewed: [address] [facility] Admission Reviewed    Imaging:    Imaging Reports Reviewed Today Include: cxray  Imaging Personally Reviewed by Myself Includes:  cxray    Recent Cultures (last 7 days):     Results from last 7 days   Lab Units 09/07/22  0541 09/06/22  1749 09/06/22  1730 09/06/22  1727   BLOOD CULTURE   --   --  No Growth at 48 hrs  No Growth at 48 hrs     SPUTUM CULTURE  2+ Growth of   --   --   --    GRAM STAIN RESULT  2+ Epithelial cells per low power field*  No polys seen*  1+ Gram positive cocci in pairs*  --   --   --    LEGIONELLA URINARY ANTIGEN   --  Negative  --   --        Last 24 Hours Medication List:   Current Facility-Administered Medications   Medication Dose Route Frequency Provider Last Rate    acetaminophen  650 mg Oral Q4H PRN Noa Pereira, CRNP      atorvastatin  80 mg Oral Daily Noa Pereira, CRNP      cefepime  2,000 mg Intravenous Q12H Noa Pereira, CRNP Stopped (09/09/22 8648)    clopidogrel  75 mg Oral Daily Noa Pereira, CRNP      dextromethorphan-guaiFENesin  10 mL Oral Q4H PRN Noa Pereira, CRNP      docusate sodium  100 mg Oral BID Noa Pereira, CRNP      furosemide  15 mg/hr Intravenous Continuous [employer], PA-C 10 mg/hr (09/09/22 6572)    gabapentin  600 mg Oral Daily PRN Noa Pereira, CRNP      HYDROmorphone  0 1 mg Intravenous Q4H PRN Noa Pereira, CRNP      LORazepam  0 25 mg Oral Q8H PRN Noa Pereira, CRNP      melatonin  6 mg Oral HS Noa Pereira, CRNP      nicotine  1 patch Transdermal Daily Noa Pereira, CRNP      ondansetron  4 mg Intravenous Q6H PRN Noa Pereira, CRNP      oxyCODONE  2 5 mg Oral Q6H PRN Noa Pereira, CRNP      polyethylene glycol  17 g Oral Daily PRN Noa Pereira, CRNP      potassium chloride  40 mEq Oral BID With Meals Noa Pereira, CRNP      warfarin  2 5 mg Oral Daily (warfarin) Noa Pereira, BARBIE          Today, Patient Was Seen By: Keila Hercules MD    ** Please Note: Dictation voice to text software may have been used in the creation of this document   **

## 2022-09-09 NOTE — OCCUPATIONAL THERAPY NOTE
Occupational Therapy Cancellation Note         Patient Name: Tyra Iqbal  CYVFG'I Date: 9/9/2022  Problem List  Principal Problem:    Acute on chronic systolic CHF (congestive heart failure) [facility] )  Active Problems:    Right lower lobe pneumonia    PAD (peripheral artery disease) (HCC)    Chronic anticoagulation    Pleural effusion, right    Tobacco abuse    LOREE (acute kidney injury) ([facility] )          09/09/22 1131   Note Type   Note type Evaluation   Cancel Reasons Medical status       OT orders received  Chart review completed  Spoke with RN Titus Farr this AM who recommended to hold Pt at this time d/t unstable medical status and significant emotional responses to any medical attention which exacerbate medical symptoms  Pt is also at this time scheduled for transfer to [facility] for higher level of care d/t cardiology needs  Will hold OT services at this time and re-address should pt not transfer and medical status improve        Belkis Loera, OTR/L

## 2022-09-09 NOTE — NURSING NOTE
Pt discharged to [facility] via [place]  Report given to Jose Fraire RN   All belongings sent along with the transplant team

## 2022-09-09 NOTE — CASE MANAGEMENT
Case Management Discharge Planning Note    Patient name Kavita Jackman  Location /-17 MRN [id number]  : 1965 Date 2022       Current Admission Date: 2022  Current Admission Diagnosis:Acute on chronic systolic CHF (congestive heart failure) [facility])   Patient Active Problem List    Diagnosis Date Noted    Pneumothorax, acute 2022    LOREE (acute kidney injury) ([facility]) 2022    Chest pain 2022    Sepsis ([facility]) 2022    CAD (coronary artery disease) 2022    Hypokalemia 2022    Acute on chronic systolic CHF (congestive heart failure) ([facility]) 2022    Right lower lobe pneumonia 2022    PAD (peripheral artery disease) ([facility]) 2022    History of COVID-19 2022    Chronic anticoagulation 2022    Pleural effusion, right 2022    Tobacco abuse 2022    Prolonged Q-T interval on ECG 2022      LOS (days): 4  Geometric Mean LOS (GMLOS) (days): 3 80  Days to GMLOS:-0 3     OBJECTIVE:  Risk of Unplanned Readmission Score: 24 87         Current admission status: Inpatient   Preferred Pharmacy:   [address] [place] [address] [address] [place] [address],[address]  [address] [place] [id number]  Phone: [phone] Fax: [phone]    Primary Care Provider: Yancy Both, DO    Primary Insurance[place] [employer]  Secondary Insurance:     DISCHARGE DETAILS:    Was informed patient will be transferred to [address] [place] [address]  Updated medical necessity

## 2022-09-09 NOTE — TRANSPORTATION MEDICAL NECESSITY
Section I - General Information    Name of Patient: Giselle Curry                 : 1965    Medicare #: [id number]  Transport Date: 22 (PCS is valid for round trips on this date and for all repetitive trips in the 60-day range as noted below )  Origin: [address] [place] [address] West: [facility]  Is the pt's stay covered under Medicare Part A (PPS/DRG)   []     Closest appropriate facility? If no, why is transport to more distant facility required? Yes  If hospice pt, is this transport related to pt's terminal illness? No       Section II - Medical Necessity Questionnaire  Ambulance transportation is medically necessary only if other means of transport are contraindicated or would be potentially harmful to the patient  To meet this requirement, the patient must either be "bed confined" or suffer from a condition such that transport by means other than ambulance is contraindicated by the patient's condition  The following questions must be answered by the medical professional signing below for this form to be valid:    1)  Describe the MEDICAL CONDITION (physical and/or mental) of this patient AT [address] [place] [address] that requires the patient to be transported in an ambulance and why transport by other means is contraindicated by the patient's condition:  Acute on chronic heart failure    2) Is the patient "bed confined" as defined below? Yes  To be "be confined" the patient must satisfy all three of the following conditions: (1) unable to get up from bed without Assistance; AND (2) unable to ambulate; AND (3) unable to sit in a chair or wheelchair  3) Can this patient safely be transported by car or wheelchair van (i e , seated during transport without a medical attendant or monitoring)?    No    4) In addition to completing questions 1-3 above, please check any of the following conditions that apply*:   *Note: supporting documentation for any boxes checked must be maintained in the patient's medical records  If hosp-hosp transfer, describe services needed at 2nd facility not available at 1st facility? IV meds/fluids required   Medical attendant required   Requires oxygen-unable to self administer  Cardiac monitoring required en route       If hosp-hosp transfer, describe services needed at 2nd facility not available at 1st facility? Needing advance cardiac care with possible ICD     Section III - Signature of Physician or Healthcare Professional  I certify that the above information is true and correct based on my evaluation of this patient, and represent that the patient requires transport by ambulance and that other forms of transport are contraindicated  I understand that this information will be used by the Centers for Medicare and Medicaid Services (CMS) to support the determination of medical necessity for ambulance services, and I represent that I have personal knowledge of the patient's condition at time of transport  []  If this box is checked, I also certify that the patient is physically or mentally incapable of signing the ambulance service's claim and that the institution with which I am affiliated has furnished care, services, or assistance to the patient  My signature below is made on behalf of the patient pursuant to 42 CFR §424 36(b)(4)  In accordance with 42 CFR §424 37, the specific reason(s) that the patient is physically or mentally incapable of signing the claim form is as follows:  Tian Byrne Physician* or Healthcare Professional______________________________________________________________  Signature Date 09/09/22 (For scheduled repetitive transports, this form is not valid for transports performed more than 60 days after this date)    Printed Name & Credentials of Physician or Healthcare Professional (MD, DO, RN, etc )____Sybil Landon RN ____________________________  *Form must be signed by patient's attending physician for scheduled, repetitive transports   For non-repetitive, unscheduled ambulance transports, if unable to obtain the signature of the attending physician, any of the following may sign (choose appropriate option below)  [] Physician Assistant []  Clinical Nurse Specialist []  Registered Nurse  []  Nurse Practitioner  [x] Discharge Planner

## 2022-09-09 NOTE — PLAN OF CARE
Problem: PAIN - ADULT  Goal: Verbalizes/displays adequate comfort level or baseline comfort level  Description: Interventions:  - Encourage patient to monitor pain and request assistance  - Assess pain using appropriate pain scale  - Administer analgesics based on type and severity of pain and evaluate response  - Implement non-pharmacological measures as appropriate and evaluate response  - Consider cultural and social influences on pain and pain management  - Notify physician/advanced practitioner if interventions unsuccessful or patient reports new pain  Outcome: Progressing     Problem: INFECTION - ADULT  Goal: Absence or prevention of progression during hospitalization  Description: INTERVENTIONS:  - Assess and monitor for signs and symptoms of infection  - Monitor lab/diagnostic results  - Monitor all insertion sites, i e  indwelling lines, tubes, and drains  - Monitor endotracheal if appropriate and nasal secretions for changes in amount and color  - [place] appropriate cooling/warming therapies per order  - Administer medications as ordered  - Instruct and encourage patient and family to use good hand hygiene technique  - Identify and instruct in appropriate isolation precautions for identified infection/condition  Outcome: Progressing  Goal: Absence of fever/infection during neutropenic period  Description: INTERVENTIONS:  - Monitor WBC    Outcome: Progressing     Problem: Knowledge Deficit  Goal: Patient/family/caregiver demonstrates understanding of disease process, treatment plan, medications, and discharge instructions  Description: Complete learning assessment and assess knowledge base    Interventions:  - Provide teaching at level of understanding  - Provide teaching via preferred learning methods  Outcome: Not Progressing     Problem: CARDIOVASCULAR - ADULT  Goal: Maintains optimal cardiac output and hemodynamic stability  Description: INTERVENTIONS:  - Monitor I/O, vital signs and rhythm  - Monitor for S/S and trends of decreased cardiac output  - Administer and titrate ordered vasoactive medications to optimize hemodynamic stability  - Assess quality of pulses, skin color and temperature  - Assess for signs of decreased coronary artery perfusion  - Instruct patient to report change in severity of symptoms  Outcome: Not Progressing  Goal: Absence of cardiac dysrhythmias or at baseline rhythm  Description: INTERVENTIONS:  - Continuous cardiac monitoring, vital signs, obtain 12 lead EKG if ordered  - Administer antiarrhythmic and heart rate control medications as ordered  - Monitor electrolytes and administer replacement therapy as ordered  Outcome: Not Progressing     Problem: HEMATOLOGIC - ADULT  Goal: Maintains hematologic stability  Description: INTERVENTIONS  - Assess for signs and symptoms of bleeding or hemorrhage  - Monitor labs  - Administer supportive blood products/factors as ordered and appropriate  Outcome: Progressing

## 2022-09-09 NOTE — CONSULTS
Consultation - Infectious Disease   Sumner Castleman 56 y o  male MRN: [id number]  Unit/Bed#: -01 Encounter: [id number]      Inpatient consult to Infectious Diseases  Consult performed by: Stephania Reyna MD  Consult ordered by: Pamela Roberson MD            REQUIRED DOCUMENTATION:     1  This service was provided via Telemedicine  2  Provider located at [facility]  3  TeleMed provider: Stephania Reyna MD   4  Identify all parties in room with patient during tele consult:RN  5  After connecting through [employer], patient was identified by name and date of birth and assistant checked wristband  Patient was then informed that this was a Telemedicine visit and that the exam was being conducted confidentially over secure lines  My office door was closed  No one else was in the room  Patient acknowledged consent and understanding of privacy and security of the Telemedicine visit, and gave us permission to have the assistant stay in the room in order to assist with the history and to conduct the exam   I informed the patient that I have reviewed their record in Epic and presented the opportunity for them to ask any questions regarding the visit today  The patient agreed to participate  Assessment/Recommendations     1  Right lobar pneumonia    - Suspected with fever, hypoxia, productive cough, CXR with new right sided infiltrate  - MRSA nasal cx negative, sputum cx with mixed bronson  - No risk factors for aspiration  - Now afebrile without leukocytosis, respiratory status appears tenuous but likely due to pulmonary edema    · Discontinue vanc, cefepime and transition to ceftriaxone 2 q24  · Anticipate 7 days of therapy through 9/13  · Recommend speech evaluation  · Monitor clinical course    2   Right pleural effusion    - R pleural effusion was present prior to interval development of pneumonia,  previous studies c/w transudative effusion  - s/p repeat thoracentesis and repeat CXR shows resolution of effusion    · Monitor respiratory status, if pleural effusion reaccumulates would recommend testing to rule out parapneumonic effusion    3  Acute on chronic systolic CHF/ischemic cadiomyopathy  - Likely cause of worsening dyspnea    Management per primary and cardiology team    Thank you for involving me in the care of your patient  Please call with questions, change in clinical status or if tests recommended above are abnormal      Discussed with the primary service  History     Reason for Consult: Fever  HPI: Jhoana Hickman is a 56y o  year old male with chronic systolic congestive heart failure with severely reduced ef, hX recent STEMI s/p PCI, PAD status post aortobifemoral bypass surgery, chronic tobacco use  He was admitted on 08/06 with acute on chronic CHF exacerbation, he was noted to have a right-sided pleural effusion and underwent thoracentesis, fluid studies were consistent with transudative effusion  Gram stain was negative  The patient presented to the ER on 9/5 with few days of worsening bilateral leg swelling and shortness of breath despite increase in oral diuretic use  In the ER, vitals were notable for tachycardia and tachypnea   Labs were notable for normal WBC  EKG noted no acute ST changes  CXR showed no infiltrate, CTA chest noted evidence of pulmonary edema and large right pleural effusion  Patient was admitted for diuresis  He underwent repeat thoracentesis, the fluid was not sent for studies  X-ray noted a patchy right basilar opacity  On hospital day 2 he developed a fever to 102 9 without leukocytosis and vancomycin and cefepime was added  CT chest was checked that noted a very small pneumothorax, right upper and lower lobe pneumonia  Blood cultures are negative to date  Overnight has had no fever or leukocytosis  Chest x-ray from yesterday notes no pneumothorax and persistent infiltrate at the right lung base    The patient currently however continues to have severe dyspnea, notes chest congestion with a slight productive cough, clear sputum  Denies chest pain  Denies fevers, chills, or sweats  Denies nausea, vomiting, or diarrhea  Infectious disease is being consulted for diagnostic work up and antibiotic management  Review of Systems  Pertinent positives and negatives as noted in HPI  Rest complete 12 point system-based review of systems is otherwise negative  PAST MEDICAL HISTORY:  Past Medical History:   Diagnosis Date    CHF [facility]congestive heart failure) ([facility] [address] )     Coronary artery disease     COVID     MI, old      Past Surgical History:   Procedure Laterality Date    ABDOMINAL SURGERY      CORONARY ANGIOPLASTY WITH STENT PLACEMENT  04/10/2022    x 2       FAMILY HISTORY:  Non-contributory    SOCIAL HISTORY:  Social History   Single  Social History     Substance and Sexual Activity   Alcohol Use Yes     Social History     Substance and Sexual Activity   Drug Use Not Currently     Social History     Tobacco Use   Smoking Status Current Every Day Smoker    Packs/day: 0 25    Types: Cigarettes   Smokeless Tobacco Never Used       ALLERGIES:  No Known Allergies    MEDICATIONS:  All current active medications have been reviewed      Physical Exam     Temp:  [97 4 °F (36 3 °C)-98 1 °F (36 7 °C)] 97 8 °F (36 6 °C)  HR:  [] 98  Resp:  [10-29] 24  BP: ()/(71-88) 99/71  SpO2:  [94 %-100 %] 99 %  Temp (24hrs), Av 8 °F (36 6 °C), Min:97 4 °F (36 3 °C), Max:98 1 °F (36 7 °C)  Current: Temperature: 97 8 °F (36 6 °C)    Intake/Output Summary (Last 24 hours) at 2022 0949  Last data filed at 2022 0801  Gross per 24 hour   Intake 881 93 ml   Output 1600 ml   Net -718 07 ml         Physical exam findings reported by bedside and primary medical team staff    General Appearance:  Appearing ill, nontoxic, and in respiratory distress, appears stated age   Head:  Normocephalic, without obvious abnormality, atraumatic   Eyes:  PERRL, conjunctiva pink and sclera anicteric, both eyes   Nose: Nares normal, mucosa normal, no drainage   Throat: Oropharynx moist without lesions; lips, mucosa, and tongue normal; teeth and gums normal   Neck: Supple, symmetrical, trachea midline, no adenopathy, no tenderness/mass/nodules   Back:   Symmetric, no curvature, ROM normal, no CVA tenderness   Lungs:   Diminished bilaterally with rhonchi and rales, respirations labored   Chest Wall:  No tenderness or deformity   Heart:  Regular rate and rhythm, S1, S2 normal, no murmur, rub or gallop   Abdomen:   Soft, non-tender, non-distended, positive bowel sounds, no masses, no organomegaly    No CVA tenderness   Extremities: Extremities 2+ edema   Skin: Skin color, texture, turgor normal, no rashes or lesions  No draining wounds noted  Lymph nodes: Cervical, supraclavicular, and axillary nodes normal   Neurologic: Alert and oriented times 3, anxious affect       Invasive Devices:   Peripheral IV 09/05/22 Left Antecubital (Active)   Site Assessment [facility] 09/09/22 0800   Dressing Type Transparent 09/09/22 0800   Line Status Infusing 09/09/22 0800   Dressing Status Clean;Dry; Intact 09/09/22 0800   Dressing Change Due 09/09/22 09/08/22 0800   Reason Not Rotated Patient refused 09/09/22 0000       Peripheral IV 09/06/22 Right;Upper;Ventral (anterior) Arm (Active)   Site Assessment [facility] 09/09/22 0800   Dressing Type Transparent 09/09/22 0800   Line Status Saline locked 09/09/22 0800   Dressing Status Clean;Dry; Intact 09/09/22 0800   Dressing Change Due 09/10/22 09/09/22 0000   Reason Not Rotated Not due 09/08/22 1600       Labs, Imaging, & Other Studies     Lab Results:    I have personally reviewed pertinent labs      Results from last 7 days   Lab Units 09/08/22  0539 09/07/22  0552 09/06/22  1623   WBC Thousand/uL 9 32 7 84 8 38   HEMOGLOBIN g/dL 10 2* 11 0* 11 7*   PLATELETS Thousands/uL 223 265 317     Results from last 7 days   Lab Units 09/09/22  0446 09/08/22  0539 09/07/22  1749 09/07/22  3181 09/06/22  0436 09/05/22  0815   POTASSIUM mmol/L 4 6 4 5   < > 4 2  4 2   < > 2 7*   CHLORIDE mmol/L 97 99   < > 100  100   < > 97   CO2 mmol/L 22 22   < > 21  21   < > 28   BUN mg/dL 28* 33*   < > 42*  42*   < > 22   CREATININE mg/dL 1 14 1 25   < > 1 28  1 28   < > 0 84   EGFR ml/min/1 73sq m 71 64   < > 62  62   < > 98   CALCIUM mg/dL 8 3 8 4   < > 8 8  8 8   < > 9 9   AST U/L  --  88*  --  95*  --  29   ALT U/L  --  91*  --  86*  --  41   ALK PHOS U/L  --  273*  --  247*  --  258*    < > = values in this interval not displayed  Results from last 7 days   Lab Units 09/07/22  0541 09/06/22  2126 09/06/22  1749 09/06/22  1730 09/06/22  1727   BLOOD CULTURE   --   --   --  No Growth at 48 hrs  No Growth at 48 hrs  SPUTUM CULTURE  2+ Growth of   --   --   --   --    GRAM STAIN RESULT  2+ Epithelial cells per low power field*  No polys seen*  1+ Gram positive cocci in pairs*  --   --   --   --    MRSA CULTURE ONLY   --  No Methicillin Resistant Staphlyococcus aureus (MRSA) isolated  --   --   --    LEGIONELLA URINARY ANTIGEN   --   --  Negative  --   --        Imaging Studies:   I have personally reviewed pertinent imaging study reports and images in PACS  EKG, Pathology, and Other Studies:   I have personally reviewed pertinent reports  Counseling/Coordination of care: Total 70 minutes communication with the patient via telehealth  Labs, medical tests and imaging studies were independently and extensively reviewed by me as noted above in HPI and old records were obtained and summarized as noted above in HPI  My recommendations were discussed with the patient in detail who verbalized understanding

## 2022-09-09 NOTE — DISCHARGE SUMMARY
[address]  Discharge- Gloria Smauels 1965, 56 y o  male MRN: [id number]  Unit/Bed#: -01 Encounter: [id number]  Primary Care Provider: Veronica Klein DO   Date and time admitted to hospital: 9/5/2022  8:01 AM    * Acute on chronic systolic CHF (congestive heart failure) ([facility] [address] )  Assessment & Plan  Wt Readings from Last 3 Encounters:   09/09/22 73 5 kg (162 lb 0 6 oz)   08/22/22 70 6 kg (155 lb 10 3 oz)   08/11/22 65 6 kg (144 lb 9 6 oz)     Presents to ED with shortness of breath and leg swelling   Has history of heart failure with multiple hospitalizations - recently discharged 08/11, Home regimen torsemide 20 mg b i d   o Has been taking as prescribed except recently has been increasing dose as he feels it was not working, last 2 days prior to admission has not taken any doses as he feels that has not been working  [facility] ED diuresed with 80 mg IV Lasix     CXR pending official read, pulmonary congestion and pleural effusion   BNP 3,120   EKG normal sinus rhythm with left atrial enlargement, rate 99, QTC of prolonged   ECHO April of 2022 revealed EF 25%, severe apical akinesis   o EF now 10-15% - cardiology having discussion of ICD with patient   o History of STEMI with cardiac catheterization done in April of 2022 requiring PCI in LAD and circumflex     Continue diuresis with Lasix drip but increase to 15mg/hr as patient not diuresing as much as required  Still feels short of breath at rest and with minimal exertion  Lactic acid elevated at 5 2  Discussed with Cardiology and with heart failure team at [facility]  Will transfer to heart failure team at [facility] and initiate Milrinone drip  If patient deteriorates may need BiPAP and higher dose of Lasix further titration of Milrinone drip  May require a right heart catheterization as well   Continue to monitor electrolytes and replete as needed   Daily weights    Previously patient was discharged with a weight of 144 lb  Currently at 162 lb   Strict I & Os   Cardiac diet, low sodium <2g, fluid restriction <1500   Cardiology recommendation appreciated    Pleural effusion, right  Assessment & Plan  · CTA PE study showing pulmonary edema with large right pleural effusion with subjacent compressive atelectasis  · Continue IV diuresis with Lasix drip   · Status post rt thoracentesis for 1 2 L 9/6      Right lower lobe pneumonia  Assessment & Plan  Suspect aspiration pneumonia  Initially patient was spiking fevers but currently afebrile for the last 24 hours  Chest x-ray shows right sided persistent infiltrate  initially on vanco and cefepime    Now transition to ceftriaxone 2 g IV daily  MRSA nasal swab negative and hence discontinue vancomycin and continue cefepime alone  Discussed with Infectious Disease    LOREE (acute kidney injury) ([facility] [address] )  Assessment & Plan  Baseline 0 8-1  Initially admitted with creatinine of 1 6  Creatinine currently stable 1 1-1 2 with diuresis    PAD (peripheral artery disease) ([facility])  Assessment & Plan  Underwent aorto-bifemoral bypass artery and reports chronic leg pains but this is unchanged         Discharging Physician / Practitioner: Denia Christina MD  PCP: Renae Dietrich DO  Admission Date:   Admission Orders (From admission, onward)     Ordered        09/05/22 Crispin Oliveros 82  Once                      Discharge Date: 09/09/22    Medical Problems             Resolved Problems  Date Reviewed: 9/9/2022          Resolved    Acute respiratory distress 9/7/2022     Resolved by  Haris Laguerre PA-C                Consultations During Hospital Stay:  · Cardiology    Procedures Performed:   · None    Significant Findings / Test Results:   CT chest abdomen pelvis wo contrast    Result Date: 9/6/2022  Impression: 1  Tiny right anterior pneumothorax of less than 5%  2   Significant interval reduction in size of right pleural effusion, now small   3   Right upper and lower lobe pneumonia  4   Findings suggestive of mild congestive heart failure  5   Mild ascites  Workstation performed: [id number]     XR chest 1 view portable    Result Date: 9/6/2022  Impression: Right basilar effusion and consolidation  Workstation performed: [id number]     XR chest portable ICU    Result Date: 9/8/2022  Impression: 1  Mild CHF improved  2   Persistent infiltrate at the right lung base  3   No pneumothorax is appreciated  Workstation performed: [id number]     XR chest portable ICU    Result Date: 9/7/2022  Impression: No discernible pneumothorax  Persistent pulmonary venous congestion, small right pleural effusion and right basilar consolidation  Workstation performed: [id number]     XR chest portable ICU    Result Date: 9/7/2022  Impression: Tiny right-sided pneumothorax on CT is not visible on portable chest x-ray  CHF and small right pleural effusion  Right basilar airspace opacity may correspond to suspected pneumonia on earlier CT study  Workstation performed: [id number]     XR chest portable ICU    Result Date: 9/7/2022  Impression: Progressive diffuse bilateral opacities, likely pulmonary edema vs  multifocal pneumonia Workstation performed: [id number]     XR chest portable ICU    Result Date: 9/6/2022  Impression: 1  Improving small right pleural effusion  No definite pneumothorax  2   Patchy right basilar opacity which may represent infection/inflammation  If clinical signs of infection, consider treatment and recommend follow-up until radiographic clearing to exclude another process  3   Cardiomegaly and mild pulmonary edema  Workstation performed: [id number]     CTA ED chest PE Study    Result Date: 9/5/2022  Impression: No evidence for pulmonary embolism  Pulmonary edema  Large right pleural effusion with subjacent compressive atelectasis  Workstation performed: [id number]     Incidental Findings:   · None     Test Results Pending at Discharge (will require follow up):    · None Outpatient Tests Requested:  · None    Complications:  None    Reason for Admission:  Shortness of breath    Hospital Course:     Rachel Hutton is a 56 y o  male patient who originally presented to the hospital on 9/5/2022 due to acute on chronic systolic CHF exacerbation  Patient has known history of EF of 20 25% however this admission dropped to 10-15%  Also noted to be fluid overloaded again despite being on torsemide 20 mg oral b i d  at home  Placed on Lasix drip and not diuresing effectively secondary to mild hypotension and also lactic acidosis noted  Patient is currently on Lasix drip at 15 milligrams/hour  Discussed with heart failure team at [facility] and will start Milrinone drip at 0 25 mcg per kg per minute  Currently he is hemodynamically stable however if he has any worsening will need BiPAP and additional Lasix  Also noted to have aspiration pneumonia initially placed on vanco and cefepime and now titrated down to Rocephin 2 g IV daily  Patient accepted at [facility] under Heart failure Team        Please see above list of diagnoses and related plan for additional information  Condition at Discharge: fair     Discharge Day Visit / Exam:     Subjective:  Patient denies any chest pain but complains of shortness of breath with minimal exertion and also at rest   Vitals: Blood Pressure: 99/71 (09/09/22 0700)  Pulse: 98 (09/09/22 0700)  Temperature: 97 8 °F (36 6 °C) (09/09/22 0700)  Temp Source: Temporal (09/09/22 0700)  Respirations: (!) 24 (09/09/22 0700)  Height: 5' 5" (165 1 cm) (09/06/22 1030)  Weight - Scale: 73 5 kg (162 lb 0 6 oz) (09/09/22 0557)  SpO2: 99 % (09/09/22 0700)  Exam:     Discussion with Family:  Will update his mother    Discharge instructions/Information to patient and family:   See after visit summary for information provided to patient and family        Provisions for Follow-Up Care:  See after visit summary for information related to follow-up care and any pertinent home health orders  Disposition:     [address] Transfer to [facility] to [facility]:   · Not Applicable to this Patient - Not Applicable to this Patient    Planned Readmission:  None     Discharge Statement:  I spent 35 minutes discharging the patient  This time was spent on the day of discharge  I had direct contact with the patient on the day of discharge  Greater than 50% of the total time was spent examining patient, answering all patient questions, arranging and discussing plan of care with patient as well as directly providing post-discharge instructions  Additional time then spent on discharge activities  Discharge Medications:  See after visit summary for reconciled discharge medications provided to patient and family        ** Please Note: This note has been constructed using a voice recognition system **

## 2022-09-09 NOTE — ASSESSMENT & PLAN NOTE
Suspect aspiration pneumonia  Initially patient was spiking fevers but currently afebrile for the last 24 hours  Chest x-ray shows right sided persistent infiltrate  initially on vanco and cefepime    MRSA nasal swab negative and hence discontinue vancomycin and continue cefepime alone    Consult placed to Infectious Disease

## 2022-09-09 NOTE — EMTALA/ACUTE CARE TRANSFER
[address] [facility] [address]  [address] [facility] [address]  Dept: [phone]      ACUTE CARE TRANSFER CONSENT    NAME Alison ISBELL 1965                              MRN [id number]    I have been informed of my rights regarding examination, treatment, and transfer   by Dr Reshma Mcbride MD    Benefits:  systolic chf exac  needs heart failure team    Risks:  worsening sob      Transfer Request:  I acknowledge that my medical condition has been evaluated and explained to me by the treating physician or other qualified medical person and/or my attending physician who has recommended and offered to me further medical examination and treatment  I understand the Hospital's obligation with respect to the treatment and stabilization of my medical condition  I nevertheless request to be transferred  I release the Hospital, the doctor, and any other persons caring for me from all responsibility or liability for any injury or ill effects that may result from my transfer and agree to accept all responsibility for the consequences of my choice to transfer, rather than receive stabilizing treatment at the Hospital  I understand that because the transfer is my request, my insurance may not provide reimbursement for the services  The Hospital will assist and direct me and my family in how to make arrangements for transfer, but the hospital is not liable for any fees charged by the transport service  In spite of this understanding, I refuse to consent to further medical examination and treatment which has been offered to me, and request transfer to    I authorize the performance of emergency medical procedures and treatments upon me in both transit and upon arrival at the receiving facility  Additionally, I authorize the release of any and all medical records to the receiving facility and request they be transported with me, if possible      I authorize the performance of emergency medical procedures and treatments upon me in both transit and upon arrival at the receiving facility  Additionally, I authorize the release of any and all medical records to the receiving facility and request they be transported with me, if possible  I understand that the safest mode of transportation during a medical emergency is an ambulance and that the Hospital advocates the use of this mode of transport  Risks of traveling to the receiving facility by car, including absence of medical control, life sustaining equipment, such as oxygen, and medical personnel has been explained to me and I fully understand them  (SUMAYA CORRECT BOX BELOW)  [  ]  I consent to the stated transfer and to be transported by ambulance/helicopter  [  ]  I consent to the stated transfer, but refuse transportation by ambulance and accept full responsibility for my transportation by car  I understand the risks of non-ambulance transfers and I exonerate the Hospital and its staff from any deterioration in my condition that results from this refusal     X___________________________________________    DATE  22  TIME________  Signature of patient or legally responsible individual signing on patient behalf           RELATIONSHIP TO PATIENT_________________________          Provider Certification    NAME Darylene Leatherwood                                        [facility] 1965                              MRN [id number]    A medical screening exam was performed on the above named patient    Based on the examination:    Condition Necessitating Transfer needs heart failure team evaluation and Milrinone drip and may be needs a right heart catheterization    Patient Condition:   fair    Reason for Transfer:   needs heart failure team evaluation    Transfer Requirements: Facility     · Space available and qualified personnel available for treatment as acknowledged by    · Agreed to accept transfer and to provide appropriate medical treatment as acknowledged by          · Appropriate medical records of the examination and treatment of the patient are provided at the time of transfer   [address] [facility], [address] _______  · Transfer will be performed by qualified personnel from    and appropriate transfer equipment as required, including the use of necessary and appropriate life support measures  Provider Certification: I have examined the patient and explained the following risks and benefits of being transferred/refusing transfer to the patient/family:         Based on these reasonable risks and benefits to the patient and/or the unborn child(delgado), and based upon the information available at the time of the patients examination, I certify that the medical benefits reasonably to be expected from the provision of appropriate medical treatments at another medical facility outweigh the increasing risks, if any, to the individuals medical condition, and in the case of labor to the unborn child, from effecting the transfer      X____________________________________________ DATE 09/09/22        TIME_______      ORIGINAL - SEND TO MEDICAL RECORDS   COPY - SEND WITH PATIENT DURING TRANSFER

## 2022-09-09 NOTE — CONSULTS
Consult received for poor appetite, acute on chronic CHF with fluid restriction  Per RN, pt was able to eat B this AM though needs to take his time due to breathing difficulties  Pt consuming 25-75% of meals per nursing flowsheets  Attempted discussion with pt  Pt continues to be out of breath during discussion, says he has been having poor intake for 2 weeks though unable to elaborate further, falling asleep during discussion  Pt says he "doesn't have much of his heart left " RN reports pt believes he is on his death bed, RN continues to provide comfort and encouragement  Author left HF Nutrition Therapy information at bedside though not appropriate for verbal diet ed again at this time  RD does not have protocol, recommend ensure pudding daily and magic cup daily given poor appetite + fluid restriction       Continue cardiac, 2gm Na as ordered, fluid restriction per MD  Pending speech rosemary

## 2022-09-09 NOTE — ASSESSMENT & PLAN NOTE
Baseline 0 8-1    Initially admitted with creatinine of 1 6  Creatinine currently stable 1 1-1 2 with diuresis

## 2022-09-09 NOTE — SPEECH THERAPY NOTE
Speech Language/Pathology    Dysphagia order received  Chart review completed  Primary documented concern is r/o aspiration, CXR yielding RLL infiltrates  Pt currently ordered a regular diet with thin liquids  Spoke with RN prior to start of evaluation  RN recommended hold as pt becomes emotionally labile causing acute change in medical status (I e , worsening anxiety, SOB, increased HR etc) with any type of medical attention  RN denies any dysphagia related concerns  Mentioned that pt requires additional time for meals for energy/breath conservation  Will not meet order @ this time and f/u-reattempt as appropriate  Recommend continuation of current diet  If s/s or episodes of difficulty arise, downgrade and communicate concerns to [address], [facility]

## 2022-09-09 NOTE — PLAN OF CARE
Problem: Potential for Falls  Goal: Patient will remain free of falls  Description: INTERVENTIONS:  - Educate patient/family on patient safety including physical limitations  - Instruct patient to call for assistance with activity   - Consult OT/PT to assist with strengthening/mobility   - Keep Call bell within reach  - Keep bed low and locked with side rails adjusted as appropriate  - Keep care items and personal belongings within reach  - Initiate and maintain comfort rounds  - Make Fall Risk Sign visible to staff  - Offer Toileting every 2 Hours, in advance of need  - Initiate/Maintain bed alarm  - Obtain necessary fall risk management equipment: yellow socks, walker  - Apply yellow socks and bracelet for high fall risk patients  - Consider moving patient to room near nurses station  Outcome: Progressing     Problem: PAIN - ADULT  Goal: Verbalizes/displays adequate comfort level or baseline comfort level  Description: Interventions:  - Encourage patient to monitor pain and request assistance  - Assess pain using appropriate pain scale  - Administer analgesics based on type and severity of pain and evaluate response  - Implement non-pharmacological measures as appropriate and evaluate response  - Consider cultural and social influences on pain and pain management  - Notify physician/advanced practitioner if interventions unsuccessful or patient reports new pain  Outcome: Not Progressing     Problem: INFECTION - ADULT  Goal: Absence or prevention of progression during hospitalization  Description: INTERVENTIONS:  - Assess and monitor for signs and symptoms of infection  - Monitor lab/diagnostic results  - Monitor all insertion sites, i e  indwelling lines, tubes, and drains  - Monitor endotracheal if appropriate and nasal secretions for changes in amount and color  - [place] appropriate cooling/warming therapies per order  - Administer medications as ordered  - Instruct and encourage patient and family to use good hand hygiene technique  - Identify and instruct in appropriate isolation precautions for identified infection/condition  Outcome: Progressing  Goal: Absence of fever/infection during neutropenic period  Description: INTERVENTIONS:  - Monitor WBC    Outcome: Progressing     Problem: SAFETY ADULT  Goal: Patient will remain free of falls  Description: INTERVENTIONS:  - Educate patient/family on patient safety including physical limitations  - Instruct patient to call for assistance with activity   - Consult OT/PT to assist with strengthening/mobility   - Keep Call bell within reach  - Keep bed low and locked with side rails adjusted as appropriate  - Keep care items and personal belongings within reach  - Initiate and maintain comfort rounds  - Make Fall Risk Sign visible to staff  - Offer Toileting every 2 Hours, in advance of need  - Initiate/Maintain bed alarm  - Obtain necessary fall risk management equipment: yellow socks, walker  - Apply yellow socks and bracelet for high fall risk patients  - Consider moving patient to room near nurses station  Outcome: Progressing  Goal: Maintain or return to baseline ADL function  Description: INTERVENTIONS:  - Educate patient/family on patient safety including physical limitations  - Instruct patient to call for assistance with activity   - Consult OT/PT to assist with strengthening/mobility   - Keep Call bell within reach  - Keep bed low and locked with side rails adjusted as appropriate  - Keep care items and personal belongings within reach  - Initiate and maintain comfort rounds  - Make Fall Risk Sign visible to staff  - Offer Toileting every 2 Hours, in advance of need  - Initiate/Maintain bed alarm  - Obtain necessary fall risk management equipment: yellow socks, walker  - Apply yellow socks and bracelet for high fall risk patients  - Consider moving patient to room near nurses station  Outcome: Progressing  Goal: Maintains/Returns to pre admission functional level  Description: INTERVENTIONS:  - Perform BMAT or MOVE assessment daily    - Set and communicate daily mobility goal to care team and patient/family/caregiver  - Collaborate with rehabilitation services on mobility goals if consulted  - Perform Range of Motion 3 times a day  - Reposition patient every 2 hours  - Dangle patient 3 times a day  - Stand patient 3 times a day  - Ambulate patient 3 times a day  - Out of bed to chair 3 times a day   - Out of bed for meals 3 times a day  - Out of bed for toileting  - Record patient progress and toleration of activity level   Outcome: Progressing     Problem: DISCHARGE PLANNING  Goal: Discharge to home or other facility with appropriate resources  Description: INTERVENTIONS:  - Identify barriers to discharge w/patient and caregiver  - Arrange for needed discharge resources and transportation as appropriate  - Identify discharge learning needs (meds, wound care, etc )  - Arrange for interpretive services to assist at discharge as needed  - Refer to Case Management Department for coordinating discharge planning if the patient needs post-hospital services based on physician/advanced practitioner order or complex needs related to functional status, cognitive ability, or social support system  Outcome: Progressing     Problem: Knowledge Deficit  Goal: Patient/family/caregiver demonstrates understanding of disease process, treatment plan, medications, and discharge instructions  Description: Complete learning assessment and assess knowledge base    Interventions:  - Provide teaching at level of understanding  - Provide teaching via preferred learning methods  Outcome: Progressing     Problem: CARDIOVASCULAR - ADULT  Goal: Maintains optimal cardiac output and hemodynamic stability  Description: INTERVENTIONS:  - Monitor I/O, vital signs and rhythm  - Monitor for S/S and trends of decreased cardiac output  - Administer and titrate ordered vasoactive medications to optimize hemodynamic stability  - Assess quality of pulses, skin color and temperature  - Assess for signs of decreased coronary artery perfusion  - Instruct patient to report change in severity of symptoms  Outcome: Progressing  Goal: Absence of cardiac dysrhythmias or at baseline rhythm  Description: INTERVENTIONS:  - Continuous cardiac monitoring, vital signs, obtain 12 lead EKG if ordered  - Administer antiarrhythmic and heart rate control medications as ordered  - Monitor electrolytes and administer replacement therapy as ordered  Outcome: Progressing     Problem: HEMATOLOGIC - ADULT  Goal: Maintains hematologic stability  Description: INTERVENTIONS  - Assess for signs and symptoms of bleeding or hemorrhage  - Monitor labs  - Administer supportive blood products/factors as ordered and appropriate  Outcome: Progressing     Problem: MOBILITY - ADULT  Goal: Maintain or return to baseline ADL function  Description: INTERVENTIONS:  - Educate patient/family on patient safety including physical limitations  - Instruct patient to call for assistance with activity   - Consult OT/PT to assist with strengthening/mobility   - Keep Call bell within reach  - Keep bed low and locked with side rails adjusted as appropriate  - Keep care items and personal belongings within reach  - Initiate and maintain comfort rounds  - Make Fall Risk Sign visible to staff  - Offer Toileting every 2 Hours, in advance of need  - Initiate/Maintain bed alarm  - Obtain necessary fall risk management equipment: yellow socks, walker  - Apply yellow socks and bracelet for high fall risk patients  - Consider moving patient to room near nurses station  Outcome: Progressing  Goal: Maintains/Returns to pre admission functional level  Description: INTERVENTIONS:  - Perform BMAT or MOVE assessment daily    - Set and communicate daily mobility goal to care team and patient/family/caregiver     - Collaborate with rehabilitation services on mobility goals if consulted  - Perform Range of Motion 3 times a day  - Reposition patient every 2 hours    - Dangle patient 3 times a day  - Stand patient 3 times a day  - Ambulate patient 3 times a day  - Out of bed to chair 3 times a day   - Out of bed for meals 3 times a day  - Out of bed for toileting  - Record patient progress and toleration of activity level   Outcome: Progressing     Problem: Prexisting or High Potential for Compromised Skin Integrity  Goal: Skin integrity is maintained or improved  Description: INTERVENTIONS:  - Identify patients at risk for skin breakdown  - Assess and monitor skin integrity  - Assess and monitor nutrition and hydration status  - Monitor labs   - Assess for incontinence   - Turn and reposition patient  - Assist with mobility/ambulation  - Relieve pressure over bony prominences  - Avoid friction and shearing  - Provide appropriate hygiene as needed including keeping skin clean and dry  - Evaluate need for skin moisturizer/barrier cream  - Collaborate with interdisciplinary team   - Patient/family teaching  - Consider wound care consult   Outcome: Progressing

## 2022-09-09 NOTE — PHYSICAL THERAPY NOTE
PHYSICAL THERAPY NOTE          Patient Name: Darylene Leatherwood  OBRXH'V Date: 9/9/2022 09/09/22 1130   Note Type   Note type Evaluation   Cancel Reasons Medical status     Received order for PT consult  Chart reviewed  Pt admitted with diagnosis acute on chronic systolic CHF  Initially spoke with Herve BRANNON who requested hold due to patient's EF, need for LifeVest and anxiety with hyperventilation with mobility  Spoke with Dr Kenia Duke who requested patient be seen  Spoke with Herve BRANNON in regards to physician request  While speaking with RN, cardiology reporting patient to be transfers to higher level of care  Pt not medically appropriate for therapy services at this time  Will continue to follow and see patient as medically appropriate at a later time       Sofia Boston, PT,DPT Test result conveyed to patient's mother.

## 2022-09-09 NOTE — ASSESSMENT & PLAN NOTE
Baseline 0 8-1  Elevated today to 1 1 likely in setting of diuresis   Need diuresis so will monitor closely

## 2022-09-09 NOTE — ASSESSMENT & PLAN NOTE
Wt Readings from Last 3 Encounters:   09/09/22 73 5 kg (162 lb 0 6 oz)   08/22/22 70 6 kg (155 lb 10 3 oz)   08/11/22 65 6 kg (144 lb 9 6 oz)     Presents to ED with shortness of breath and leg swelling   Has history of heart failure with multiple hospitalizations - recently discharged 08/11, Home regimen torsemide 20 mg b i d   o Has been taking as prescribed except recently has been increasing dose as he feels it was not working, last 2 days prior to admission has not taken any doses as he feels that has not been working  [facility] diuresed with 80 mg IV Lasix     CXR pending official read, pulmonary congestion and pleural effusion   BNP 3,120   EKG normal sinus rhythm with left atrial enlargement, rate 99, QTC of prolonged   ECHO April of 2022 revealed EF 25%, severe apical akinesis   o EF now 10-15% - cardiology having discussion of ICD with patient      Continue diuresis with Lasix drip but increase to 15mg/hr as patient not diuresing as much as required  Still feels short of breath at rest and with minimal exertion   Continue to monitor electrolytes and replete as needed   Daily weights  Previously patient was discharged with a weight of 144 lb    Currently at 162 lb   Strict I & Os   Cardiac diet, low sodium <2g, fluid restriction <1500   Cardiology recommendation appreciated

## 2022-09-09 NOTE — PROGRESS NOTES
Progress Note - Cardiology   Jacek Chisholm 56 y o  male MRN: [id number]  Unit/Bed#: -01 Encounter: [id number]    Assessment:  Acute on chronic HFrEF with R pleural effusion (previous thoracentesis was transudative)              - on torsemide 20 mg PO BID as an OP              - sp thoracentesis   - received lasix 10 mg/hr gtt and bumex IV x 1, still on lasix drip with soft BPs   - negative >3 L since admission  Sepsis with suspected PNA on IV abx   Ischemic cardiomyopathy- GDMT on hold for hypotension   CAD sp STEMI 4/10/22 sp PCI To LAD and PCI to prox circ  Apical akinesis on coumadin  PAD  Smoking  anxiety    Plan: 1  Increase lasix drip at 15 mg/hr  2  kdur 40 meq PO BID  3  Recommend life vest given persistently low LVEF  Will need eventual evaluation for ICD  4  Monitor I and O, daily standing weights, renal function and electrolytes  BMP BID  5  We did discuss this case with advanced heart failure at [facility] [place]- Dr Lay Forrester to discuss need for milrinone  We did order a lactate level now  They recommend patient to be transferred to [facility] to hospitalist service  Advanced heart failure in consult  6  Treat underlying possible PNA    Subjective/Objective     Subjective: pt reports anxiety this AM  No chest pain overnight  Reports breathing remains difficult    Objective: minimal urinary output  Electrolytes stable  Remains on Lasix drip at 10 mg/hr  bp has improved  No fevers overnight   cxr yesterday showing persistent infiltrate     Vitals: BP 99/71 (BP Location: Left arm)   Pulse 98   Temp 97 8 °F (36 6 °C) (Temporal)   Resp (!) 24   Ht 5' 5" (1 651 m)   Wt 73 5 kg (162 lb 0 6 oz)   SpO2 99%   BMI 26 96 kg/m²   Vitals:    09/09/22 0436 09/09/22 0557   Weight: 73 5 kg (162 lb 0 6 oz) 73 5 kg (162 lb 0 6 oz)     Orthostatic Blood Pressures    Flowsheet Row Most Recent Value   Blood Pressure 99/71 filed at 09/09/2022 0700   Patient Position - Orthostatic VS Lying filed at 09/09/2022 0700 Intake/Output Summary (Last 24 hours) at 9/9/2022 1019  Last data filed at 9/9/2022 0801  Gross per 24 hour   Intake 759 98 ml   Output 1450 ml   Net -690 02 ml       Invasive Devices  Report    Peripheral Intravenous Line  Duration           Peripheral IV 09/05/22 Left Antecubital 4 days    Peripheral IV 09/06/22 Right;Upper;Ventral (anterior) Arm 2 days                      Physical Exam  Constitutional:       Appearance: Normal appearance  HENT:      Head: Normocephalic and atraumatic  Cardiovascular:      Rate and Rhythm: Normal rate  Heart sounds: No murmur heard  No gallop  Pulmonary:      Breath sounds: No wheezing  Comments: Decreased breath sounds in R lung diffusely  Abdominal:      Palpations: Abdomen is soft  Musculoskeletal:         General: Swelling present  Cervical back: Neck supple  Skin:     General: Skin is warm and dry  Capillary Refill: Capillary refill takes less than 2 seconds  Neurological:      General: No focal deficit present  Mental Status: He is alert and oriented to person, place, and time          Lab Results:   I have personally reviewed pertinent lab results      CBC with diff:   Results from last 7 days   Lab Units 09/08/22  0539   WBC Thousand/uL 9 32   RBC Million/uL 4 54   HEMOGLOBIN g/dL 10 2*   HEMATOCRIT % 34 8*   MCV fL 77*   MCH pg 22 5*   MCHC g/dL 29 3*   RDW % 19 9*   MPV fL 9 8   PLATELETS Thousands/uL 223     CMP:   Results from last 7 days   Lab Units 09/09/22  0446 09/08/22  0539   SODIUM mmol/L 131* 133*   CHLORIDE mmol/L 97 99   CO2 mmol/L 22 22   BUN mg/dL 28* 33*   CREATININE mg/dL 1 14 1 25   CALCIUM mg/dL 8 3 8 4   AST U/L  --  88*   ALT U/L  --  91*   ALK PHOS U/L  --  273*   EGFR ml/min/1 73sq m 71 64     HS Troponin:   0   Lab Value Date/Time    HSTNI 225 (H) 09/07/2022 0130    HSTNI0 48 09/06/2022 1558    HSTNI2 86 (H) 09/06/2022 1729    HSTNI4 150 (H) 09/06/2022 2021    HSTNI4 15 08/06/2022 1728     BNP: Results from last 7 days   Lab Units 09/09/22  0446   POTASSIUM mmol/L 4 6   CHLORIDE mmol/L 97   CO2 mmol/L 22   BUN mg/dL 28*   CREATININE mg/dL 1 14   CALCIUM mg/dL 8 3   EGFR ml/min/1 73sq m 71     Coags:   Results from last 7 days   Lab Units 09/09/22  0446   INR  2 56*     TSH:     Magnesium:   Results from last 7 days   Lab Units 09/08/22  0539   MAGNESIUM mg/dL 2 1     Lipid Profile:     Imaging: I have personally reviewed pertinent reports  Echo 9/6/22:       Left Ventricle: Definity echo enhancement utilized to improve endocardial border definition given inability to adequately visualize 2 contiguous endocardial border segments and for improved visualization of the LV apex  The left ventricle demonstrates normal wall thickness with enlarged LV cavity size is severely reduced LV function  Estimated ejection fraction is 10-15%  There is severe global hypokinesis with akinesis involving the septum, anterior septum, and apex  Diastolic assessment was not performed    Right Ventricle: The right ventricle appears enlarged with reduced function    Mitral Valve: Mild thickening of the mitral leaflets with preserved excursion and moderate central insufficiency  Mitral regurgitation likely functional given enlarged LV cavity dimensions    Tricuspid Valve: Grossly normal-appearing tricuspid leaflets with mild regurgitation  Increased estimated pulmonary pressure of 56 mmHg    Aorta: The aortic root is normal in size  The ascending aorta is mildly dilated      Compared to prior study performed April 1356, LV systolic function was reduced with the ejection fraction at that time estimated in the range of 25-29%  Hypokinesis described globally but also involving the mid to distal anterior, septal, lateral, apical segments    The right ventricle was described as having normal size with mildly reduced RV function          EKG:   Narrative & Impression   Normal sinus rhythm  Possible Left atrial enlargement  Rightward axis  Septal infarct (cited on or before 22-AUG-2022)  Nonspecific ST abnormality  Prolonged QT  Abnormal ECG  When compared with ECG of 22-AUG-2022 03:37,  Premature ventricular complexes are no longer Present  T wave inversion more evident in Inferior leads  Confirmed by Jamarcus Coppola ([id number]) on 9/5/2022 10:19:14 PM

## 2022-09-09 NOTE — ASSESSMENT & PLAN NOTE
Wt Readings from Last 3 Encounters:   09/09/22 73 5 kg (162 lb 0 6 oz)   08/22/22 70 6 kg (155 lb 10 3 oz)   08/11/22 65 6 kg (144 lb 9 6 oz)     Presents to ED with shortness of breath and leg swelling   Has history of heart failure with multiple hospitalizations - recently discharged 08/11, Home regimen torsemide 20 mg b i d   o Has been taking as prescribed except recently has been increasing dose as he feels it was not working, last 2 days prior to admission has not taken any doses as he feels that has not been working  [facility] diuresed with 80 mg IV Lasix     CXR pending official read, pulmonary congestion and pleural effusion   BNP 3,120   EKG normal sinus rhythm with left atrial enlargement, rate 99, QTC of prolonged   ECHO April of 2022 revealed EF 25%, severe apical akinesis   o EF now 10-15% - cardiology having discussion of ICD with patient   o History of STEMI with cardiac catheterization done in April of 2022 requiring PCI in LAD and circumflex     Continue diuresis with Lasix drip but increase to 15mg/hr as patient not diuresing as much as required  Still feels short of breath at rest and with minimal exertion  Lactic acid elevated at 5 2  Discussed with Cardiology and with heart failure team at [facility]  Will transfer to heart failure team at [facility] and initiate Milrinone drip  If patient deteriorates may need BiPAP and higher dose of Lasix further titration of Milrinone drip  May require a right heart catheterization as well   Continue to monitor electrolytes and replete as needed   Daily weights  Previously patient was discharged with a weight of 144 lb    Currently at 162 lb   Strict I & Os   Cardiac diet, low sodium <2g, fluid restriction <1500   Cardiology recommendation appreciated

## 2022-09-09 NOTE — ASSESSMENT & PLAN NOTE
Suspect aspiration pneumonia  Initially patient was spiking fevers but currently afebrile for the last 24 hours  Chest x-ray shows right sided persistent infiltrate  initially on vanco and cefepime    Now transition to ceftriaxone 2 g IV daily  MRSA nasal swab negative and hence discontinue vancomycin and continue cefepime alone    Discussed with Infectious Disease

## 2022-09-11 LAB
BACTERIA BLD CULT: NORMAL
BACTERIA BLD CULT: NORMAL

## 2022-09-12 NOTE — UTILIZATION REVIEW
URGENT/EMERGENT  INPATIENT/SPU AUTHORIZATION REQUEST    Date: 09/12/22            # Pages in this Request:     X New Request   Additional Information for PA#:     Office Contact Name:  George Sibley Title: Utilization Review, [place] Nurse     Phone: [phone]  Ext  Availability (Date/Time): Wednesday - Friday 8 am- 4 pm    x Inpatient Review  SPU Review       x Current        Late Pick-up   · How your facility was first notified of the Late Pick-up:  · When your facility was first notified of the Late Pick-up (date):         RECIPIENT INFORMATION    Recipient ID#: [id number]   Recipient Name: Lex Dang      YOB: 1965  56 y o  Recipient Alias:     Gender:  X Male  Female Medicaid Eligibility ([address] [place] [address]): INSURANCE INFORMATION    (All other private or governmental health insurance benefits must be utilized prior to billing the [place] Program)    Was this admission the result of an MVA, other accident, assault, injury, fall, gunshot, bite etc ? Yes x No                   If yes, provide a brief description of the incident  Does the recipient have other insurance coverage? Yes x No        Insurance Company Name/Policy #      Did that insurance pay on this claim? Yes  No        Did that insurance deny this claim? Yes  No    If yes, reason for denial:      Does the recipient have Medicare? Yes x No        Did Medicare exhaust prior to this admission? Yes  No        Did Medicare partially pay this claim? Yes  No        Did that insurance deny this claim? Yes  No    If yes, reason for denial:          Was the recipient a prisoner at the time of admission?   Yes x No            PROVIDER INFORMATION    [facility] Provider ID#: [id number]    [address] [place] [address] Physician Name: Colton Neff Provider ID#: [id number]        ADMISSION INFORMATION    Type of Admission: (please choose one)    x ED      Direct    If yes, from where?     Transfer    If yes, transferring hospital (inpatient, rehab, or psych) Provider Name/Provider ID#: Admission Floor or Unit Type: Med Surg  - Telem    Dates/Times:        ED Date/Time: 9/5/2022  8:01 AM        Observation Date/Time:         Admission Date/Time: 9/5/22 12:48 PM        Discharge or Transfer Date/Time: 9/9/2022  5:00 PM        DIAGNOSIS/PROCEDURE CODES    Primary Diagnosis Code/Primary Diagnosis Code description:  R06 02 Shortness of breath   E87 6 Hypokalemia   J90 Pleural effusion, not elsewhere classified   I50 23 Acute on chronic systolic (congestive) heart failure   Additional Diagnosis Code(s) and Description(s)-(up to three additional codes):    Procedure Code (one) and description:        CLINICAL INFORMATION - 2 Vielka Lucas    Is there a prior admission with a discharge date within 30 days of the date of this admission? X No (Proceed to the next section - "Clinical Information - General Review Checklist:)      Yes (Provide the following information)     Prior admission dates:     MA Prior Authorization Number:        Review Outcome:     Diagnosis Code(s)/Description:    Procedure Code/Description:    Findings:    Treatment:    Condition on Discharge:   Vitals:    Labs:   Imaging:   Medications: Follow-up Instructions:    Disposition:        CLINICAL INFORMATION - GENERAL REVIEW CHECKLIST    EMERGENCY DEPARTMENT: (Proceed to "ADMISSION" if Direct Admission)    Presenting Signs/Symptoms:   56 y o  male to ED presents shortness of breath and leg swelling  Recently discharged on 08/11 due to CHF exacerbation   Was started on torsemide 20 mg bid   He was doing well for a few weeks however, noticed  torsemide seem to not be working to full effectiveness is start taking increased doses in the afternoon   The last 2 days noticed it seems to not be working much at all and did not take any dose of torsemide   Notes he eats a lot of fruits and vegetables, try stay away from [employer] he quit smoking 6 hours prior to presentation to the ER   Has been having continued dyspnea on exertion, unable to make it up 7 flights of stairs without needing to take a break  PMH for systolic CHF, PAD, chronic anticoagulation, tobacco abuse,CAD with recent stenting  Admit Inpatient level of care for Acute on chronic systolic CHF, Dyspnea on exertion, Hypokalemia and Right pleural effusion  EKG  normal sinus rhythm with left atrial enlargement, rate 99, QTC of prolonged  Tele monitoring  Continue diuresis with Iv Lasix 60 mg bid  Daily wts  Cardiology consult  Low Na diet, fluid restriction <1500  K 2 7 on admit  Recheck  Hold coumadin given Vit k for reversal  On exam; Crackles over bilateral lung, worse on right  Edema (up to mid thigh, +3 pitting) to BLE    CTA PE study showing pulmonary edema with large right pleural effusion with subjacent compressive atelectasis    Medication/treatment prior to arrival in the ED:    Past Medical History:    Clinical Exam:    Initial Vital Signs: (Temp, Pulse, Resp, and BP)   ED Triage Vitals [09/05/22 0805]   Temperature Pulse Respirations Blood Pressure SpO2   98 1 °F (36 7 °C) (!) 106 22 122/79 99 %      Temp Source Heart Rate Source Patient Position - Orthostatic VS BP Location FiO2 (%)   Temporal Monitor Sitting Right arm --      Pain Score       8           Pertinent Repeat Vital Signs: (include times they were obtained)  09/06/22 1528 -- 108 Abnormal  18 -- -- 99 % 28 2 L/min Nasal cannula --   09/06/22 15:05:07 -- 29 Abnormal  -- 122/82 95 89 % Abnormal  -- -- --        09/06/22 1115 -- 98 -- 123/94 104 90 % -- -- -- --   09/06/22 1102 -- 99 18 123/94 104 89 % Abnormal                  09/06/22 07:30:43 97 5 °F (36 4 °C) 100 19 129/90 103 95 % -- -- -- --   09/06/22 0328 -- -- -- -- -- 97 % 24 1 L/min Nasal cannula --   09/06/22 03:27:49 97 8 °F (36 6 °C) 97 22 111/81 91 79 % Abnormal   -- -- -- --   SpO2: inaccurate reading, readjusted pulse ox at 09/06/22 0327   09/05/22 2259 98 8 °F (37 1 °C) 88 20 112/80 -- 96 % -- -- None (Room air) --   09/05/22 2017 -- -- -- -- -- -- -- -- None (Room air) --   09/05/22 1900 98 3 °F (36 8 °C) 89 22 120/89 -- 98 % -- -- -- --   09/05/22 15:27:27 -- 95 22 112/82 92 98 % -- -- -- --   09/05/22 1353 -- -- -- -- -- -- -- -- None (Room air)            Pertinent Sustained Findings: (include times they were obtained)    Weight in Kilograms:  09/06/22 75 3 kg (166 lb)     Wt Readings from Last 1 Encounters:   09/09/22 73 5 kg (162 lb 0 6 oz)     Wt Readings    09/05/22 73 kg (160 lb 15 oz)   08/22/22 70 6 kg (155 lb 10 3 oz)   08/11/22 65 6 kg (144 lb 9 6 oz)            Pertinent Labs (results):  Results from last 7 days   Lab Units 09/06/22  1623 09/06/22  0436 09/05/22  0815   WBC Thousand/uL 8 38 6 92 6 37   HEMOGLOBIN g/dL 11 7* 10 5* 11 1*   HEMATOCRIT % 40 9 35 5* 38 6   PLATELETS Thousands/uL 317 306 293   NEUTROS ABS Thousands/µL  --   --  4 11                Results from last 7 days   Lab Units 09/06/22  0436 09/05/22  0815   SODIUM mmol/L 137 136   POTASSIUM mmol/L 3 1* 2 7*   CHLORIDE mmol/L 98 97   CO2 mmol/L 24 28   ANION GAP mmol/L 15* 11   BUN mg/dL 31* 22   CREATININE mg/dL 1 35* 0 84   EGFR ml/min/1 73sq m 58 98   CALCIUM mg/dL 9 0 9 9   MAGNESIUM mg/dL 2 0 2 1   PHOSPHORUS mg/dL  --  4 6*           Results from last 7 days   Lab Units 09/05/22  0815   AST U/L 29   ALT U/L 41   ALK PHOS U/L 258*   TOTAL PROTEIN g/dL 6 9   ALBUMIN g/dL 3 2*   TOTAL BILIRUBIN mg/dL 1 06*                Results from last 7 days   Lab Units 09/06/22  0436 09/05/22  0815   GLUCOSE RANDOM mg/dL 90 106                Results from last 7 days   Lab Units 09/05/22  1013 09/05/22  0815   HS TNI 0HR ng/L  --  23   HS TNI 2HR ng/L 23  --    HSTNI D2 ng/L 0  --            Results from last 7 days   Lab Units 09/05/22  0815   D-DIMER QUANTITATIVE ug/ml FEU 3 44*            Results from last 7 days   Lab Units 09/06/22  0436 09/05/22  0815 PROTIME seconds 22 5* 25 5*   INR   1 96* 2 31*               Results from last 7 days   Lab Units 09/05/22  0815   NT-PRO BNP pg/mL 3,120*              Radiology (results):  XR chest portable ICU   Final Result by Sarahi Aranda MD (09/06 1259)           1  Improving small right pleural effusion  No definite pneumothorax  2   Patchy right basilar opacity which may represent infection/inflammation  If clinical signs of infection, consider treatment and recommend follow-up until radiographic clearing to exclude another process  3   Cardiomegaly and mild pulmonary edema                    VAS lower limb venous duplex study, complete bilateral - No evidence of acute or chronic deep vein thrombosis  No evidence of superficial thrombophlebitis noted   Final Result by Erik Weller MD (93/96 3249)       CTA ED chest PE Study   Final Result by Amarjit Watt MD (09/05 1018)       No evidence for pulmonary embolism        Pulmonary edema  Large right pleural effusion with subjacent compressive atelectasis                  XR chest 1 view portable   Final Result by Steffen Hensley MD (09/06 0043)       Right basilar effusion and consolidation                CT chest abdomen pelvis wo contrast  (9/6) -    Impression: 1  Tiny right anterior pneumothorax of less than 5%  2   Significant interval reduction in size of right pleural effusion, now small  3   Right upper and lower lobe pneumonia  4   Findings suggestive of mild congestive heart failure  5   Mild ascites       XR chest portable ICU     Result Date: 9/8/2022  Impression: 1  Mild CHF improved  2   Persistent infiltrate at the right lung base  3   No pneumothorax is appreciated     XR chest portable ICU     Result Date: 9/7/2022  Impression: No discernible pneumothorax   Persistent pulmonary venous congestion, small right pleural effusion and right basilar consolidation     XR chest portable ICU     Result Date: 9/7/2022  Impression: Tiny right-sided pneumothorax on CT is not visible on portable chest x-ray  CHF and small right pleural effusion  Right basilar airspace opacity may correspond to suspected pneumonia on earlier CT study       XR chest portable ICU     Result Date: 9/7/2022  Impression: Progressive diffuse bilateral opacities, likely pulmonary edema vs  multifocal pneumonia            EKG (results): Other tests (results):    Tests pending final results:    Treatment in the ED:   Medication Administration from 09/05/2022 0756 to 09/05/2022 1348       Date/Time Order Dose Route Action Comments     09/05/2022 0852 acetaminophen (TYLENOL) tablet 650 mg 650 mg Oral Given      09/05/2022 1114 potassium chloride (K-DUR,KLOR-CON) CR tablet 40 mEq 40 mEq Oral Given      09/05/2022 0950 iohexol (OMNIPAQUE) 350 MG/ML injection (MULTI-DOSE) 85 mL 85 mL Intravenous Given      09/05/2022 1004 fentanyl citrate (PF) 100 MCG/2ML 25 mcg 25 mcg Intravenous Given      09/05/2022 1121 furosemide (LASIX) injection 80 mg 80 mg Intravenous Given      09/05/2022 1114 furosemide (LASIX) injection 80 mg 0 mg Intravenous Hold pt does not want to urinate while in the ED due to "no privacy"           Other treatments:      Change in condition while in the ED:     Response to ED Treatment:          OBSERVATION: (Proceed to "ADMISSION" if Direct Admission)    Orders written during the observation period  Meds Name, dose, route, time, how may doses given:  PRN Meds Name, dose, route, time, how many doses given within the first 24 hrs :  IVs Type, rate, and total amt  ordered/given:  Labs, imaging, other:  Consults and findings:    Test Results during the observation period  Pertinent Lab tests (dates/results):  Culture results (blood, urine, spinal, wound, respiratory, etc ):  Imaging tests (dates/results):  EKG (dates/results):   Other test (dates/results):  Tests pending (dates/results):    Surgical or Invasive Procedures during the observation period  Name of surgery/procedure:  Date & Time:  Patient Response:  Post-operative orders:  Operative Report/Findings:    Response to Treatment, Major Change in Condition, Major Charge in Treatment during the observation period          ADMISSION:    DIRECT Admissions Only:    · Presenting Signs/Symptoms:   ·   · Medication/treatment prior to arrival:  ·   · Past Medical History:  ·   · Clinical Exam on admission:  ·   · Vital Signs on admission: (Temp, Pulse, Resp, and BP)  ·   · Weight in kilograms:     ALL Admissions:    Admission Orders and Other Orders written within the first 24 hrs after admission  Meds Name, dose, route, time, how may doses given:  atorvastatin, 80 mg, Oral, Daily  docusate sodium, 100 mg, Oral, BID  melatonin, 6 mg, Oral, HS  metoprolol succinate, 25 mg, Oral, Daily  nicotine, 1 patch, Transdermal, Daily  potassium chloride, 40 mEq, Oral, BID With Meals - started 9/6  Bumex 0 5 mg iv once - 9/6 x1   Cefepime 2000 mg iv q12 - started 9/6  Rocephin 2000 mg iv once - 9/9 x 1  Benadryl 25 mg po once - 9/5 x 1  Dilaudid 0 1 mg iv once - 9/6 x 1   Ativan 0 25 po once - 9/9  Toprol XL - 25 mg po qd - 9/6 x 1  Versed 1 mg iv once - 9/6 x 1   Aqua-mephyton 5 mg iv once - 9/5 x 1   Potassium Chloride 20 meq IV 9/5 x2  K dur 40 meq po once 9/5 x 1 Vancomycin 750 mg iv q12 - started 9/6  Coumadin 2 5 mg po qd - started 9/7      furosemide (LASIX) injection 60 mg  Dose: 60 mg  Freq: 2 times daily Route: IV  Start: 09/05/22 1800 End: 09/06/22 1052           PRN Meds Name, dose, route, time, how many doses given within the first 24 hrs :    acetaminophen, 650 mg, Oral, Q4H PRN - 9/6 x1 - 9/7 x 2   acetaminophen, 975 mg, Oral, Q6H PRN  gabapentin, 600 mg, Oral, Daily PRN-9/5 x1 - 9/6 x 1   hydrOXYzine HCL, 50 mg, Oral, Q6H PRN-9/6 x 1 -9/7 x 2   ondansetron, 4 mg, Intravenous, Q6H PRN  oxyCODONE, 5 mg, Oral, Q6H PRN  polyethylene glycol, 17 g, Oral, Daily PRN  Robitussin  10 ml po prn - 9/7 x2  Dilaudid 0 1 mg po prn - 9/9 x 1  Toradol 15 mg iv prn - 9/5 x1 - 9/6 x 1  Ativan 0 25 mg po prn - 9/7 x 2 - 9/8 x 3 - 9/9 x 1           ketorolac (TORADOL) injection 15 mg  Dose: 15 mg  Freq: Every 6 hours PRN Route: IV 9/5 x1, 9/6 x1  PRN Reasons: moderate pain,severe pain  Start: 09/05/22 2034 End: 09/06/22 0823      IVs Type, rate, and total amt  Ordered/given:    furosemide, 10 mg/hr, Intravenous, Continuous  milrinone (PRIMACOR) 20 mg in 100 mL infusion (premix)  Rate: 5 5 mL/hr Dose: 0 25 mcg/kg/min  Weight Dosing Info: 73 5 kg  Freq: Continuous Route: IV  Last Dose: 0 25 mcg/kg/min (09/09/22 1403)  Start: 09/09/22 1330 End: 09/09/22 1955    Labs, imaging, other:      Consults and findings:  Cardiology cons; 9/6 -  Known history of ischemic cardiomyopathy   Patient is status post ST-elevation myocardial infarction April 2022, having PCI to the LAD and circumflex   Reduced LV systolic function, previously ejection fraction range of 25-29% by echocardiogram performed April 2022  Plan for Thoracentesis today or tomorrow  Coumadin on hold for thoracentesis  Stop Iv Lasix boluses  Started on Iv Lasix drip  Kdur 40 meq po bid  Echo pending but at bedside LVEF appears to be lower, now 10%  Consider life vest given persistently low LVEF  Monitor I&O  On exam; Decreased breath sounds in R lung diffusely  Critical Care Cons - 9/6 - 56 M PMH CAD s/p TAE to LAD in 4/2022 on Plavix, CHF (LVEF 10% 9/6/22) with apical akinesis on warfarin for intracardiac thrombus ppx, PAD s/p lower extremity stenting, smoking, COPD  Presented 9/5 with intermittent right chest pain and SOB in the setting of diuretic nonadherence, found to have large right pleural effusion, underwent left thoracentesis around noon today  Admitted to ICU this afternoon for acutely worsening right-sided pleuritic chest pain  Subsequently found to have tiny PTX and pneumonia on imaging       Infectious Disease Consult - 9/9 -  R lobar pneumonia  -  Suspected fever, hypoxia, productive cough, CXR showed new R side infiltrate  MRSA nasal cx negative, sputum cx with mixed bronson  D/c Vanc, cefepime and transition to Ceftriaxone, anticipate 7 days of therapy through 7/13  R pleural effusion - was present prior to interval development development of pneumonia, prior studies c/w transudative effusion  S/p repeat thoracentesis CXR shows resolution of effusion  Acute on chronic systolic CHF/ ischemic cardiomyopathy  Likely cause of worsening dyspnea  Test Results after admission  Pertinent Lab tests (dates/results):  Culture results (blood, urine, spinal, wound, respiratory, etc ):  Imaging tests (dates/results):  EKG (dates/results): Other test (dates/results):  Tests pending (dates/results):    Surgical or Invasive Procedures  Name of surgery/procedure: Thoracentesis   Date & Time: 9/6/2022  Patient Response: tolerated   Post-operative orders: Same   Operative Report/Findings:  1 2 L fluid removed  Ultrasound-guided right thoracentesis, successful on first attempt,   patient reporting improvement in SOB immediately after procedure        Response to Treatment, Major Change in Condition, Major Charge in Treatment anytime during admission  Date: 9/6   Day 2:  Critical Care level of care   Progress notes; EF now 10-15% - cardiology having discussion of ICD with patient  LOREE; Creat elevated to 1 35 today, baseline 0 8-1  Need diuresis so will monitor creat closely with switching to Iv Lasix Drip  On exam, continues with crackles bilaterally, decreased more on right side  +3 pitting edema to BLE  Hospital Course:      Troy Mcnair is a 56 y o  male patient who originally presented to the hospital on 9/5/2022 due to acute on chronic systolic CHF exacerbation  Patient has known history of EF of 20 25% however this admission dropped to 10-15%  Also noted to be fluid overloaded again despite being on torsemide 20 mg oral b i d  at home    Placed on Lasix drip and not diuresing effectively secondary to mild hypotension and also lactic acidosis noted  Patient is currently on Lasix drip at 15 milligrams/hour  Discussed with heart failure team at [facility] and will start Milrinone drip at 0 25 mcg per kg per minute  Currently he is hemodynamically stable however if he has any worsening will need BiPAP and additional Lasix      Also noted to have aspiration pneumonia initially placed on vanco and cefepime and now titrated down to Rocephin 2 g IV daily    Patient accepted at [facility] under Heart failure Team       Disposition on Discharge  Home, Rehab, SNF, LTC, Shelter, etc : [address] to Breathe ([facility])  If a patient expires during an admission, in addition to the above information, please include:    Summary/timeline of the patient's decline in condition:    Medications and treatment:    Patient response to treatment:    Date and time patient ceased to breathe:        Is there a Readmission that follows this admission? Yes X No    If yes, provide dates:          InterQual Review    InterQual Criteria Met: X Yes  No  N/A        Please include the InterQual Review, InterQual year/version used, and the criteria selected:     Created Using Review Status Review Entered   InterQual Connect In Primary 9/6/2022 16:56       Criteria Set Name - Subset   LOC:Acute Adult-Heart Failure      Criteria Review   REVIEW SUMMARY     Money Dashboard® Review Status:  In Primary  Review Type: Admission  Criteria Status: Critical Met  Day of review: Episode Day 1  Condition Specific: Yes        REVIEW DETAILS     Service Date: 9/5/2022  Product: [employer] Adult  Subset: Heart Failure        (Symptom or finding within 24h)     (Excludes PO medications unless noted)         [X] Select Day, One:          [X] Episode Day 1, One:              [X] CRITICAL, >= One:                  [X] Heart failure and, Both:                      [X] Finding, >= One:                          [X] O2 sat < 89%(0 89) and < baseline                      [X] Diuretic every 1-2h or continuous        Version: InterQual® 2022, Apr 2022 Release  InterQual® criteria (IQ) is confidential and proprietary information and is being provided to you solely as it pertains to the information requested  IQ may contain advanced clinical knowledge which we recommend you discuss with your physician upon disclosure to you  Use permitted by and subject to license with "[employer] and/or one of its [place]  IQ reflects clinical interpretations and analyses and cannot alone either (a) resolve medical ambiguities of particular situations; or (b) provide the sole basis for definitive decisions  IQ is intended solely for use as screening guidelines with respect to medical appropriateness of healthcare services  All ultimate care decisions are strictly and solely the obligation and responsibility of your health care provider  © 2022 "[employer] and/or one of its subsidiaries  All Rights Reserved  CPT® only © 8630-4409 [employer]  All Rights Reserved  PLEASE SUBMIT THE COMPLETED FORM TO THE DEPARTMENT OF HUMAN SERVICES - DIVISION OF CLINICAL  REVIEW VIA FAX -[phone] or VIA E-MAIL TO [email or web address] [email or web address]    Signature: Sondra Pérez Date:  09/12/22    Confidentiality Notice: The documents accompanying this telecopy may contain confidential information belonging to the sender  The information is intended only for the use of the individual named above  If you are not the intended recipient, you are hereby notified  That any disclosure, copying, distribution or taking of any telecopy is strictly prohibited

## 2022-11-07 ENCOUNTER — HOSPITAL ENCOUNTER (EMERGENCY)
Facility: HOSPITAL | Age: 57
Discharge: HOME | End: 2022-11-07
Attending: EMERGENCY MEDICINE

## 2022-11-07 ENCOUNTER — APPOINTMENT (EMERGENCY)
Dept: RADIOLOGY | Facility: HOSPITAL | Age: 57
End: 2022-11-07

## 2022-11-07 VITALS
WEIGHT: 140.43 LBS | BODY MASS INDEX: 23.4 KG/M2 | TEMPERATURE: 97.5 F | OXYGEN SATURATION: 98 % | HEART RATE: 96 BPM | SYSTOLIC BLOOD PRESSURE: 115 MMHG | RESPIRATION RATE: 22 BRPM | HEIGHT: 65 IN | DIASTOLIC BLOOD PRESSURE: 72 MMHG

## 2022-11-07 DIAGNOSIS — J44.1 COPD EXACERBATION (HCC): Primary | ICD-10-CM

## 2022-11-07 DIAGNOSIS — E87.6 HYPOKALEMIA: ICD-10-CM

## 2022-11-07 DIAGNOSIS — J40 BRONCHITIS: ICD-10-CM

## 2022-11-07 PROBLEM — J18.9 RIGHT LOWER LOBE PNEUMONIA: Status: RESOLVED | Noted: 2022-08-06 | Resolved: 2022-11-07

## 2022-11-07 LAB
2HR DELTA HS TROPONIN: -1 NG/L
ALBUMIN SERPL BCP-MCNC: 2.8 G/DL (ref 3.5–5)
ALP SERPL-CCNC: 390 U/L (ref 46–116)
ALT SERPL W P-5'-P-CCNC: 78 U/L (ref 12–78)
ANION GAP SERPL CALCULATED.3IONS-SCNC: 9 MMOL/L (ref 4–13)
APTT PPP: 25 SECONDS (ref 23–37)
AST SERPL W P-5'-P-CCNC: 75 U/L (ref 5–45)
BASOPHILS # BLD AUTO: 0.09 THOUSANDS/ÂΜL (ref 0–0.1)
BASOPHILS NFR BLD AUTO: 1 % (ref 0–1)
BILIRUB SERPL-MCNC: 1.75 MG/DL (ref 0.2–1)
BUN SERPL-MCNC: 19 MG/DL (ref 5–25)
CALCIUM ALBUM COR SERPL-MCNC: 10 MG/DL (ref 8.3–10.1)
CALCIUM SERPL-MCNC: 9 MG/DL (ref 8.3–10.1)
CARDIAC TROPONIN I PNL SERPL HS: 25 NG/L
CARDIAC TROPONIN I PNL SERPL HS: 26 NG/L
CHLORIDE SERPL-SCNC: 90 MMOL/L (ref 96–108)
CO2 SERPL-SCNC: 31 MMOL/L (ref 21–32)
CREAT SERPL-MCNC: 0.82 MG/DL (ref 0.6–1.3)
EOSINOPHIL # BLD AUTO: 0.05 THOUSAND/ÂΜL (ref 0–0.61)
EOSINOPHIL NFR BLD AUTO: 1 % (ref 0–6)
ERYTHROCYTE [DISTWIDTH] IN BLOOD BY AUTOMATED COUNT: 25.6 % (ref 11.6–15.1)
FLUAV RNA RESP QL NAA+PROBE: NEGATIVE
FLUBV RNA RESP QL NAA+PROBE: NEGATIVE
GFR SERPL CREATININE-BSD FRML MDRD: 99 ML/MIN/1.73SQ M
GLUCOSE SERPL-MCNC: 86 MG/DL (ref 65–140)
HCT VFR BLD AUTO: 40.4 % (ref 36.5–49.3)
HGB BLD-MCNC: 11.9 G/DL (ref 12–17)
IMM GRANULOCYTES # BLD AUTO: 0.04 THOUSAND/UL (ref 0–0.2)
IMM GRANULOCYTES NFR BLD AUTO: 0 % (ref 0–2)
INR PPP: 1.14 (ref 0.84–1.19)
LYMPHOCYTES # BLD AUTO: 1.32 THOUSANDS/ÂΜL (ref 0.6–4.47)
LYMPHOCYTES NFR BLD AUTO: 14 % (ref 14–44)
MCH RBC QN AUTO: 23.9 PG (ref 26.8–34.3)
MCHC RBC AUTO-ENTMCNC: 29.5 G/DL (ref 31.4–37.4)
MCV RBC AUTO: 81 FL (ref 82–98)
MONOCYTES # BLD AUTO: 0.53 THOUSAND/ÂΜL (ref 0.17–1.22)
MONOCYTES NFR BLD AUTO: 6 % (ref 4–12)
NEUTROPHILS # BLD AUTO: 7.14 THOUSANDS/ÂΜL (ref 1.85–7.62)
NEUTS SEG NFR BLD AUTO: 78 % (ref 43–75)
NRBC BLD AUTO-RTO: 0 /100 WBCS
NT-PROBNP SERPL-MCNC: 4665 PG/ML
PLATELET # BLD AUTO: 200 THOUSANDS/UL (ref 149–390)
PMV BLD AUTO: 9.4 FL (ref 8.9–12.7)
POTASSIUM SERPL-SCNC: 2.7 MMOL/L (ref 3.5–5.3)
PROT SERPL-MCNC: 6.3 G/DL (ref 6.4–8.4)
PROTHROMBIN TIME: 14.7 SECONDS (ref 11.6–14.5)
RBC # BLD AUTO: 4.98 MILLION/UL (ref 3.88–5.62)
RSV RNA RESP QL NAA+PROBE: NEGATIVE
SARS-COV-2 RNA RESP QL NAA+PROBE: NEGATIVE
SODIUM SERPL-SCNC: 130 MMOL/L (ref 135–147)
WBC # BLD AUTO: 9.17 THOUSAND/UL (ref 4.31–10.16)

## 2022-11-07 RX ORDER — AZITHROMYCIN 250 MG/1
TABLET, FILM COATED ORAL
Qty: 6 TABLET | Refills: 0 | Status: SHIPPED | OUTPATIENT
Start: 2022-11-07 | End: 2022-11-11

## 2022-11-07 RX ORDER — POTASSIUM CHLORIDE 14.9 MG/ML
20 INJECTION INTRAVENOUS ONCE
Status: COMPLETED | OUTPATIENT
Start: 2022-11-07 | End: 2022-11-07

## 2022-11-07 RX ORDER — OXYCODONE HYDROCHLORIDE AND ACETAMINOPHEN 5; 325 MG/1; MG/1
1 TABLET ORAL ONCE
Status: COMPLETED | OUTPATIENT
Start: 2022-11-07 | End: 2022-11-07

## 2022-11-07 RX ORDER — POTASSIUM CHLORIDE 20 MEQ/1
40 TABLET, EXTENDED RELEASE ORAL ONCE
Status: COMPLETED | OUTPATIENT
Start: 2022-11-07 | End: 2022-11-07

## 2022-11-07 RX ADMIN — POTASSIUM CHLORIDE 40 MEQ: 20 TABLET, EXTENDED RELEASE ORAL at 03:00

## 2022-11-07 RX ADMIN — POTASSIUM CHLORIDE 20 MEQ: 14.9 INJECTION, SOLUTION INTRAVENOUS at 03:01

## 2022-11-07 RX ADMIN — OXYCODONE HYDROCHLORIDE AND ACETAMINOPHEN 1 TABLET: 5; 325 TABLET ORAL at 04:00

## 2022-11-07 NOTE — ED PROVIDER NOTES
History  Chief Complaint   Patient presents with   • Shortness of Breath     Pt c/o sob x1 week  States he has been "gasping for air"       History provided by:  Medical records, patient and parent (Mother)  Shortness of Breath  Severity:  Moderate  Onset quality:  Sudden  Duration:  30 minutes  Timing:  Constant  Progression:  Unchanged  Chronicity:  Recurrent  Context comment:  Patient is a smoker, 30 minutes prior to arrival states he got sudden onset of dyspnea, feels like he has a collapsed lung on the left side, chest tightness  Records show history of pneumothorax, patient denies having numbness, denies having a thoracotomy  Relieved by:  Nothing  Worsened by:  Nothing  Ineffective treatments:  None tried  Associated symptoms: chest pain and cough    Associated symptoms: no abdominal pain, no ear pain, no fever, no headaches, no rash, no sore throat and no vomiting    Risk factors: tobacco use        Prior to Admission Medications   Prescriptions Last Dose Informant Patient Reported?  Taking?   acetaminophen (TYLENOL) 325 mg tablet   No No   Sig: Take 2 tablets (650 mg total) by mouth every 6 (six) hours as needed for mild pain, headaches or fever   atorvastatin (LIPITOR) 80 mg tablet   Yes No   Sig: Take 80 mg by mouth daily   benzonatate (TESSALON PERLES) 100 mg capsule   No No   Sig: Take 1 capsule (100 mg total) by mouth 3 (three) times a day as needed for cough   Patient not taking: Reported on 9/5/2022   clopidogrel (PLAVIX) 75 mg tablet   Yes No   Sig: Take 75 mg by mouth daily   docusate sodium (COLACE) 100 mg capsule   No No   Sig: Take 1 capsule (100 mg total) by mouth 2 (two) times a day   Patient not taking: Reported on 9/5/2022   gabapentin (NEURONTIN) 600 MG tablet  Self Yes No   Sig: Take 600 mg by mouth daily as needed   metoprolol succinate (TOPROL-XL) 25 mg 24 hr tablet   Yes No   Sig: Take 25 mg by mouth daily   polyethylene glycol (MIRALAX) 17 g packet   No No   Sig: Take 17 g by mouth daily   Patient not taking: Reported on 9/5/2022   potassium chloride (K-DUR,KLOR-CON) 20 mEq tablet   No No   Sig: Take 1 tablet (20 mEq total) by mouth 2 (two) times a day   torsemide (DEMADEX) 20 mg tablet   No No   Sig: Take 2 tablets (40 mg total) by mouth daily after breakfast AND 1 tablet (20 mg total) daily after lunch  Patient not taking: Reported on 9/5/2022   warfarin (COUMADIN) 2 5 mg tablet   No No   Sig: Take 1 tablet (2 5 mg total) by mouth daily      Facility-Administered Medications: None       Past Medical History:   Diagnosis Date   • CHF (congestive heart failure) (HCC)    • Coronary artery disease    • COVID    • MI, old        Past Surgical History:   Procedure Laterality Date   • ABDOMINAL SURGERY     • CORONARY ANGIOPLASTY WITH STENT PLACEMENT  04/10/2022    x 2       History reviewed  No pertinent family history  I have reviewed and agree with the history as documented  E-Cigarette/Vaping   • E-Cigarette Use Never User      E-Cigarette/Vaping Substances     Social History     Tobacco Use   • Smoking status: Former Smoker     Packs/day: 0 25     Types: Cigarettes   • Smokeless tobacco: Never Used   Vaping Use   • Vaping Use: Never used   Substance Use Topics   • Alcohol use: Not Currently   • Drug use: Not Currently       Review of Systems   Constitutional: Negative for appetite change, chills, fatigue and fever  HENT: Negative for ear pain, rhinorrhea, sore throat and trouble swallowing  Eyes: Negative for pain, discharge and visual disturbance  Respiratory: Positive for cough, chest tightness and shortness of breath  Cardiovascular: Positive for chest pain  Negative for palpitations  Gastrointestinal: Negative for abdominal pain, nausea and vomiting  Endocrine: Negative for polydipsia, polyphagia and polyuria  Genitourinary: Negative for difficulty urinating, dysuria, hematuria and testicular pain  Musculoskeletal: Negative for arthralgias and back pain     Skin: Negative for color change and rash  Allergic/Immunologic: Negative for immunocompromised state  Neurological: Negative for dizziness, seizures, syncope, weakness and headaches  Hematological: Negative for adenopathy  Psychiatric/Behavioral: Negative for confusion and dysphoric mood  All other systems reviewed and are negative  Physical Exam  Physical Exam  Constitutional:       General: He is not in acute distress  Appearance: Normal appearance  He is not ill-appearing, toxic-appearing or diaphoretic  HENT:      Head: Normocephalic and atraumatic  Nose: Nose normal  No congestion or rhinorrhea  Mouth/Throat:      Mouth: Mucous membranes are moist       Pharynx: Oropharynx is clear  No oropharyngeal exudate or posterior oropharyngeal erythema  Eyes:      General:         Right eye: No discharge  Left eye: No discharge  Cardiovascular:      Rate and Rhythm: Normal rate and regular rhythm  Pulses: Normal pulses  Heart sounds: Normal heart sounds  No murmur heard  No gallop  Pulmonary:      Effort: Pulmonary effort is normal  Tachypnea present  No respiratory distress  Breath sounds: No stridor  Examination of the right-lower field reveals wheezing and rales  Examination of the left-lower field reveals wheezing and rales  Wheezing and rales present  No rhonchi  Chest:      Chest wall: No tenderness  Abdominal:      General: Bowel sounds are normal  There is no distension  Palpations: Abdomen is soft  There is no mass  Tenderness: There is no abdominal tenderness  There is no right CVA tenderness, left CVA tenderness, guarding or rebound  Hernia: No hernia is present  Musculoskeletal:         General: Normal range of motion  Cervical back: Normal range of motion and neck supple  Right lower leg: Edema present  Left lower leg: Edema present        Comments: Bilateral ankle nonpitting edema, mild   Skin:     General: Skin is warm and dry  Capillary Refill: Capillary refill takes less than 2 seconds  Comments: Mild mottling right leg, slightly cool to touch, hx of chronic partial arterial occlusion RLE   Neurological:      General: No focal deficit present  Mental Status: He is alert and oriented to person, place, and time  Cranial Nerves: No cranial nerve deficit  Sensory: No sensory deficit  Motor: No weakness  Coordination: Coordination normal       Gait: Gait normal       Deep Tendon Reflexes: Reflexes normal    Psychiatric:         Mood and Affect: Mood normal          Behavior: Behavior normal          Thought Content:  Thought content normal          Judgment: Judgment normal          Vital Signs  ED Triage Vitals   Temperature Pulse Respirations Blood Pressure SpO2   11/07/22 0103 11/07/22 0103 11/07/22 0103 11/07/22 0103 11/07/22 0103   97 5 °F (36 4 °C) 104 (!) 26 116/75 100 %      Temp Source Heart Rate Source Patient Position - Orthostatic VS BP Location FiO2 (%)   11/07/22 0103 11/07/22 0103 11/07/22 0103 11/07/22 0103 --   Temporal Monitor Sitting Left arm       Pain Score       11/07/22 0400       10 - Worst Possible Pain           Vitals:    11/07/22 0103 11/07/22 0115   BP: 116/75 116/75   Pulse: 104 101   Patient Position - Orthostatic VS: Sitting Sitting         Visual Acuity      ED Medications  Medications   potassium chloride 20 mEq IVPB (premix) (20 mEq Intravenous New Bag 11/7/22 0301)   potassium chloride (K-DUR,KLOR-CON) CR tablet 40 mEq (40 mEq Oral Given 11/7/22 0300)   oxyCODONE-acetaminophen (PERCOCET) 5-325 mg per tablet 1 tablet (1 tablet Oral Given 11/7/22 0400)       Diagnostic Studies  Results Reviewed     Procedure Component Value Units Date/Time    HS Troponin I 2hr [[id number]]  (Normal) Collected: 11/07/22 0305    Lab Status: Final result Specimen: Blood from Arm, Right Updated: 11/07/22 0335     hs TnI 2hr 25 ng/L      Delta 2hr hsTnI -1 ng/L     HS Troponin I 4hr [[id number]]     Lab Status: No result Specimen: Blood     NT-BNP PRO [[id number]]  (Abnormal) Collected: 11/07/22 0113    Lab Status: Final result Specimen: Blood from Arm, Right Updated: 11/07/22 0243     NT-proBNP 4,665 pg/mL     Comprehensive metabolic panel [[id number]]  (Abnormal) Collected: 11/07/22 0113    Lab Status: Final result Specimen: Blood from Arm, Right Updated: 11/07/22 0237     Sodium 130 mmol/L      Potassium 2 7 mmol/L      Chloride 90 mmol/L      CO2 31 mmol/L      ANION GAP 9 mmol/L      BUN 19 mg/dL      Creatinine 0 82 mg/dL      Glucose 86 mg/dL      Calcium 9 0 mg/dL      Corrected Calcium 10 0 mg/dL      AST 75 U/L      ALT 78 U/L      Alkaline Phosphatase 390 U/L      Total Protein 6 3 g/dL      Albumin 2 8 g/dL      Total Bilirubin 1 75 mg/dL      eGFR 99 ml/min/1 73sq m     Narrative:      [facility] guidelines for Chronic Kidney Disease (CKD):   •  Stage 1 with normal or high GFR (GFR > 90 mL/min/1 73 square meters)  •  Stage 2 Mild CKD (GFR = 60-89 mL/min/1 73 square meters)  •  Stage 3A Moderate CKD (GFR = 45-59 mL/min/1 73 square meters)  •  Stage 3B Moderate CKD (GFR = 30-44 mL/min/1 73 square meters)  •  Stage 4 Severe CKD (GFR = 15-29 mL/min/1 73 square meters)  •  Stage 5 End Stage CKD (GFR <15 mL/min/1 73 square meters)  Note: GFR calculation is accurate only with a steady state creatinine    Protime-INR [[id number]]  (Abnormal) Collected: 11/07/22 0132    Lab Status: Final result Specimen: Blood from Arm, Right Updated: 11/07/22 0222     Protime 14 7 seconds      INR 1 14    APTT [[id number]]  (Normal) Collected: 11/07/22 0132    Lab Status: Final result Specimen: Blood from Arm, Right Updated: 11/07/22 0222     PTT 25 seconds     FLU/RSV/COVID - if FLU/RSV clinically relevant [[id number]]  (Normal) Collected: 11/07/22 0113    Lab Status: Final result Specimen: Nares from Nose Updated: 11/07/22 0211     SARS-CoV-2 Negative     INFLUENZA A PCR Negative INFLUENZA B PCR Negative     RSV PCR Negative    Narrative:      FOR PEDIATRIC PATIENTS - copy/paste COVID Guidelines URL to browser: [email or web address]  [email or web address]    SARS-CoV-2 assay is a Nucleic Acid Amplification assay intended for the  qualitative detection of nucleic acid from SARS-CoV-2 in nasopharyngeal  swabs  Results are for the presumptive identification of SARS-CoV-2 RNA  Positive results are indicative of infection with SARS-CoV-2, the virus  causing COVID-19, but do not rule out bacterial infection or co-infection  with other viruses  Laboratories within the [place] and its  territories are required to report all positive results to the appropriate  public health authorities  Negative results do not preclude SARS-CoV-2  infection and should not be used as the sole basis for treatment or other  patient management decisions  Negative results must be combined with  clinical observations, patient history, and epidemiological information  This test has not been FDA cleared or approved  This test has been authorized by FDA under an Emergency Use Authorization  (EUA)  This test is only authorized for the duration of time the  declaration that circumstances exist justifying the authorization of the  emergency use of an in vitro diagnostic tests for detection of SARS-CoV-2  virus and/or diagnosis of COVID-19 infection under section 564(b)(1) of  the Act, 21 U  S C  870HPY-8(S)(2), unless the authorization is terminated  or revoked sooner  The test has been validated but independent review by FDA  and CLIA is pending  Test performed using [employer] GeneXpert: This RT-PCR assay targets N2,  a region unique to SARS-CoV-2  A conserved region in the E-gene was chosen  for pan-Sarbecovirus detection which includes SARS-CoV-2  According to CMS-2020-01-R, this platform meets the definition of high-throughput technology      HS Troponin 0hr (reflex protocol) [[id number]]  (Normal) Collected: 11/07/22 0113    Lab Status: Final result Specimen: Blood from Arm, Right Updated: 11/07/22 0157     hs TnI 0hr 26 ng/L     CBC and differential [[id number]]  (Abnormal) Collected: 11/07/22 0113    Lab Status: Final result Specimen: Blood from Arm, Right Updated: 11/07/22 0132     WBC 9 17 Thousand/uL      RBC 4 98 Million/uL      Hemoglobin 11 9 g/dL      Hematocrit 40 4 %      MCV 81 fL      MCH 23 9 pg      MCHC 29 5 g/dL      RDW 25 6 %      MPV 9 4 fL      Platelets 533 Thousands/uL      nRBC 0 /100 WBCs      Neutrophils Relative 78 %      Immat GRANS % 0 %      Lymphocytes Relative 14 %      Monocytes Relative 6 %      Eosinophils Relative 1 %      Basophils Relative 1 %      Neutrophils Absolute 7 14 Thousands/µL      Immature Grans Absolute 0 04 Thousand/uL      Lymphocytes Absolute 1 32 Thousands/µL      Monocytes Absolute 0 53 Thousand/µL      Eosinophils Absolute 0 05 Thousand/µL      Basophils Absolute 0 09 Thousands/µL                  XR chest 1 view portable    (Results Pending)              Procedures  Procedures         ED Course  ED Course as of 11/07/22 0422   Mon Nov 07, 2022   0104 0104:  Patient appears chronically ill, vital signs reviewed  Placed on monitor  EKG shows normal sinus rhythm without acute ischemia, LVH, rightward axis, biatrial enlargement, poor R-wave progression  Plan to complete basic labs including cardiac enzymes, BNP, check chest x-ray  3215 [id number]:  Chest x-ray and labs reviewed  Plan to complete T2 and make disposition  I will replete potassium  SBIRT 22yo+    Flowsheet Row Most Recent Value   SBIRT (25 yo +)    In order to provide better care to our patients, we are screening all of our patients for alcohol and drug use  Would it be okay to ask you these screening questions? Yes Filed at: 11/07/2022 0111   Initial Alcohol Screen: US AUDIT-C     1   How often do you have a drink containing alcohol? 0 Filed at: 11/07/2022 0111   2  How many drinks containing alcohol do you have on a typical day you are drinking? 0 Filed at: 11/07/2022 0111   3a  Male UNDER 65: How often do you have five or more drinks on one occasion? 0 Filed at: 11/07/2022 0111   3b  FEMALE Any Age, or MALE 65+: How often do you have 4 or more drinks on one occassion? 0 Filed at: 11/07/2022 0111   Audit-C Score 0 Filed at: 11/07/2022 0111   PRIYA: How many times in the past year have you    Used an illegal drug or used a prescription medication for non-medical reasons? Never Filed at: 11/07/2022 0111                    MDM    Disposition  Final diagnoses:   [facility]   Bronchitis   Hypokalemia     Time reflects when diagnosis was documented in both MDM as applicable and the Disposition within this note     Time User Action Codes Description Comment    11/7/2022  3:56 AM Tenny Blocker Add [J44 1] COPD exacerbation ([facility] )     11/7/2022  3:56 AM Tenny Blocker Add [J40] Bronchitis     11/7/2022  3:57 AM Tenny Blocker Add [E87 6] Hypokalemia       ED Disposition     ED Disposition   Discharge    Condition   Stable    Date/Time   Mon Nov 7, 2022  3:57 AM    Comment   Berhane Savage discharge to home/self care  Follow-up Information     Follow up With Specialties Details Why Contact Info    Femi Mari DO  Schedule an appointment as soon as possible for a visit   [address] [place] [address]  [phone]            Patient's Medications   Discharge Prescriptions    AZITHROMYCIN (ZITHROMAX) 250 MG TABLET    Take 2 tablets today then 1 tablet daily x 4 days       Start Date: 11/7/2022 End Date: 11/11/2022       Order Dose: --       Quantity: 6 tablet    Refills: 0       No discharge procedures on file      PDMP Review       Value Time User    PDMP Reviewed  Yes 8/6/2022  4:01 PM Chloe Bauman MD          ED Provider  Electronically Signed by           Viktoriya Tran MD  11/07/22 5542

## 2022-11-08 PROBLEM — A41.9 SEPSIS (HCC): Status: RESOLVED | Noted: 2022-09-06 | Resolved: 2022-11-08

## 2022-11-10 LAB
ATRIAL RATE: 105 BPM
P AXIS: 58 DEGREES
PR INTERVAL: 162 MS
QRS AXIS: 102 DEGREES
QRSD INTERVAL: 116 MS
QT INTERVAL: 366 MS
QTC INTERVAL: 483 MS
T WAVE AXIS: 11 DEGREES
VENTRICULAR RATE: 105 BPM

## 2022-11-17 ENCOUNTER — APPOINTMENT (EMERGENCY)
Dept: CT IMAGING | Facility: HOSPITAL | Age: 57
End: 2022-11-17

## 2022-11-17 ENCOUNTER — APPOINTMENT (EMERGENCY)
Dept: RADIOLOGY | Facility: HOSPITAL | Age: 57
End: 2022-11-17

## 2022-11-17 ENCOUNTER — HOSPITAL ENCOUNTER (EMERGENCY)
Facility: HOSPITAL | Age: 57
Discharge: ANOTHER HOSPITAL | End: 2022-11-17
Attending: STUDENT IN AN ORGANIZED HEALTH CARE EDUCATION/TRAINING PROGRAM

## 2022-11-17 VITALS
OXYGEN SATURATION: 96 % | TEMPERATURE: 99.3 F | RESPIRATION RATE: 22 BRPM | DIASTOLIC BLOOD PRESSURE: 74 MMHG | SYSTOLIC BLOOD PRESSURE: 92 MMHG | WEIGHT: 140.43 LBS | BODY MASS INDEX: 23.37 KG/M2 | HEART RATE: 115 BPM

## 2022-11-17 DIAGNOSIS — I25.10 CORONARY ARTERY DISEASE INVOLVING NATIVE CORONARY ARTERY OF NATIVE HEART WITHOUT ANGINA PECTORIS: ICD-10-CM

## 2022-11-17 DIAGNOSIS — R07.9 CHEST PAIN: ICD-10-CM

## 2022-11-17 DIAGNOSIS — I70.209 ARTERIAL OCCLUSION, LOWER EXTREMITY (HCC): Primary | ICD-10-CM

## 2022-11-17 LAB
2HR DELTA HS TROPONIN: 191 NG/L
4HR DELTA HS TROPONIN: 470 NG/L
ALBUMIN SERPL BCP-MCNC: 2.9 G/DL (ref 3.5–5)
ALP SERPL-CCNC: 390 U/L (ref 46–116)
ALT SERPL W P-5'-P-CCNC: 98 U/L (ref 12–78)
ANION GAP SERPL CALCULATED.3IONS-SCNC: 32 MMOL/L (ref 4–13)
APTT PPP: 32 SECONDS (ref 23–37)
AST SERPL W P-5'-P-CCNC: 252 U/L (ref 5–45)
BASE EX.OXY STD BLDV CALC-SCNC: 91.7 % (ref 60–80)
BASE EXCESS BLDV CALC-SCNC: -18.4 MMOL/L
BASOPHILS # BLD AUTO: 0.03 THOUSANDS/ÂΜL (ref 0–0.1)
BASOPHILS NFR BLD AUTO: 0 % (ref 0–1)
BILIRUB DIRECT SERPL-MCNC: 2.17 MG/DL (ref 0–0.2)
BILIRUB SERPL-MCNC: 2.9 MG/DL (ref 0.2–1)
BUN SERPL-MCNC: 39 MG/DL (ref 5–25)
CALCIUM SERPL-MCNC: 9 MG/DL (ref 8.3–10.1)
CARDIAC TROPONIN I PNL SERPL HS: 1728 NG/L
CARDIAC TROPONIN I PNL SERPL HS: 1919 NG/L
CARDIAC TROPONIN I PNL SERPL HS: 2198 NG/L
CHLORIDE SERPL-SCNC: 96 MMOL/L (ref 96–108)
CK MB SERPL-MCNC: 10.9 % (ref 0–2.5)
CK MB SERPL-MCNC: 26.2 NG/ML (ref 0–5)
CK SERPL-CCNC: 241 U/L (ref 39–308)
CO2 SERPL-SCNC: 11 MMOL/L (ref 21–32)
CREAT SERPL-MCNC: 1.67 MG/DL (ref 0.6–1.3)
D DIMER PPP FEU-MCNC: >20 UG/ML FEU
EOSINOPHIL # BLD AUTO: 0 THOUSAND/ÂΜL (ref 0–0.61)
EOSINOPHIL NFR BLD AUTO: 0 % (ref 0–6)
ERYTHROCYTE [DISTWIDTH] IN BLOOD BY AUTOMATED COUNT: 25.1 % (ref 11.6–15.1)
ERYTHROCYTE [DISTWIDTH] IN BLOOD BY AUTOMATED COUNT: 25.2 % (ref 11.6–15.1)
FLUAV RNA RESP QL NAA+PROBE: NEGATIVE
FLUBV RNA RESP QL NAA+PROBE: NEGATIVE
GFR SERPL CREATININE-BSD FRML MDRD: 45 ML/MIN/1.73SQ M
GLUCOSE SERPL-MCNC: 33 MG/DL (ref 65–140)
HCO3 BLDV-SCNC: 8.6 MMOL/L (ref 24–30)
HCT VFR BLD AUTO: 32.9 % (ref 36.5–49.3)
HCT VFR BLD AUTO: 36.9 % (ref 36.5–49.3)
HGB BLD-MCNC: 10 G/DL (ref 12–17)
HGB BLD-MCNC: 9.3 G/DL (ref 12–17)
IMM GRANULOCYTES # BLD AUTO: 0.26 THOUSAND/UL (ref 0–0.2)
IMM GRANULOCYTES NFR BLD AUTO: 2 % (ref 0–2)
INR PPP: 4.23 (ref 0.84–1.19)
INR PPP: 4.59 (ref 0.84–1.19)
LACTATE SERPL-SCNC: 13.7 MMOL/L (ref 0.5–2)
LACTATE SERPL-SCNC: 20.2 MMOL/L (ref 0.5–2)
LYMPHOCYTES # BLD AUTO: 0.87 THOUSANDS/ÂΜL (ref 0.6–4.47)
LYMPHOCYTES NFR BLD AUTO: 5 % (ref 14–44)
MAGNESIUM SERPL-MCNC: 2.5 MG/DL (ref 1.6–2.6)
MCH RBC QN AUTO: 25 PG (ref 26.8–34.3)
MCH RBC QN AUTO: 25.5 PG (ref 26.8–34.3)
MCHC RBC AUTO-ENTMCNC: 27.1 G/DL (ref 31.4–37.4)
MCHC RBC AUTO-ENTMCNC: 28.3 G/DL (ref 31.4–37.4)
MCV RBC AUTO: 90 FL (ref 82–98)
MCV RBC AUTO: 92 FL (ref 82–98)
MONOCYTES # BLD AUTO: 1.03 THOUSAND/ÂΜL (ref 0.17–1.22)
MONOCYTES NFR BLD AUTO: 6 % (ref 4–12)
NEUTROPHILS # BLD AUTO: 13.84 THOUSANDS/ÂΜL (ref 1.85–7.62)
NEUTS SEG NFR BLD AUTO: 87 % (ref 43–75)
NRBC BLD AUTO-RTO: 1 /100 WBCS
NT-PROBNP SERPL-MCNC: 9966 PG/ML
O2 CT BLDV-SCNC: 14.7 ML/DL
PCO2 BLDV: 24.4 MM HG (ref 42–50)
PH BLDV: 7.16 [PH] (ref 7.3–7.4)
PLATELET # BLD AUTO: 231 THOUSANDS/UL (ref 149–390)
PLATELET # BLD AUTO: 259 THOUSANDS/UL (ref 149–390)
PMV BLD AUTO: 10 FL (ref 8.9–12.7)
PMV BLD AUTO: 9.4 FL (ref 8.9–12.7)
PO2 BLDV: 90.2 MM HG (ref 35–45)
POTASSIUM SERPL-SCNC: 5.3 MMOL/L (ref 3.5–5.3)
PROCALCITONIN SERPL-MCNC: 22.97 NG/ML
PROT SERPL-MCNC: 6.4 G/DL (ref 6.4–8.4)
PROTHROMBIN TIME: 40.7 SECONDS (ref 11.6–14.5)
PROTHROMBIN TIME: 43.3 SECONDS (ref 11.6–14.5)
RBC # BLD AUTO: 3.64 MILLION/UL (ref 3.88–5.62)
RBC # BLD AUTO: 4 MILLION/UL (ref 3.88–5.62)
RSV RNA RESP QL NAA+PROBE: NEGATIVE
SARS-COV-2 RNA RESP QL NAA+PROBE: NEGATIVE
SODIUM SERPL-SCNC: 139 MMOL/L (ref 135–147)
WBC # BLD AUTO: 13.53 THOUSAND/UL (ref 4.31–10.16)
WBC # BLD AUTO: 16.03 THOUSAND/UL (ref 4.31–10.16)

## 2022-11-17 RX ORDER — HEPARIN SODIUM 1000 [USP'U]/ML
2400 INJECTION, SOLUTION INTRAVENOUS; SUBCUTANEOUS
Status: DISCONTINUED | OUTPATIENT
Start: 2022-11-17 | End: 2022-11-17 | Stop reason: HOSPADM

## 2022-11-17 RX ORDER — ACETAMINOPHEN 325 MG/1
650 TABLET ORAL ONCE
Status: COMPLETED | OUTPATIENT
Start: 2022-11-17 | End: 2022-11-17

## 2022-11-17 RX ORDER — MIDODRINE HYDROCHLORIDE 10 MG/1
10 TABLET ORAL 3 TIMES DAILY
COMMUNITY

## 2022-11-17 RX ORDER — HEPARIN SODIUM 1000 [USP'U]/ML
4800 INJECTION, SOLUTION INTRAVENOUS; SUBCUTANEOUS ONCE
Status: COMPLETED | OUTPATIENT
Start: 2022-11-17 | End: 2022-11-17

## 2022-11-17 RX ORDER — HEPARIN SODIUM 10000 [USP'U]/100ML
3-30 INJECTION, SOLUTION INTRAVENOUS
Status: DISCONTINUED | OUTPATIENT
Start: 2022-11-17 | End: 2022-11-17 | Stop reason: HOSPADM

## 2022-11-17 RX ORDER — HEPARIN SODIUM 1000 [USP'U]/ML
4800 INJECTION, SOLUTION INTRAVENOUS; SUBCUTANEOUS
Status: DISCONTINUED | OUTPATIENT
Start: 2022-11-17 | End: 2022-11-17 | Stop reason: HOSPADM

## 2022-11-17 RX ORDER — DEXTROSE MONOHYDRATE 25 G/50ML
25 INJECTION, SOLUTION INTRAVENOUS ONCE
Status: COMPLETED | OUTPATIENT
Start: 2022-11-17 | End: 2022-11-17

## 2022-11-17 RX ORDER — VANCOMYCIN HYDROCHLORIDE 1 G/200ML
15 INJECTION, SOLUTION INTRAVENOUS ONCE
Status: COMPLETED | OUTPATIENT
Start: 2022-11-17 | End: 2022-11-17

## 2022-11-17 RX ADMIN — HEPARIN SODIUM 4800 UNITS: 1000 INJECTION INTRAVENOUS; SUBCUTANEOUS at 09:37

## 2022-11-17 RX ADMIN — DEXTROSE MONOHYDRATE 25 ML: 25 INJECTION, SOLUTION INTRAVENOUS at 06:54

## 2022-11-17 RX ADMIN — VANCOMYCIN HYDROCHLORIDE 1000 MG: 1 INJECTION, SOLUTION INTRAVENOUS at 09:40

## 2022-11-17 RX ADMIN — IOHEXOL 120 ML: 350 INJECTION, SOLUTION INTRAVENOUS at 07:07

## 2022-11-17 RX ADMIN — HEPARIN SODIUM 18 UNITS/KG/HR: 10000 INJECTION, SOLUTION INTRAVENOUS at 09:37

## 2022-11-17 RX ADMIN — IOHEXOL 85 ML: 350 INJECTION, SOLUTION INTRAVENOUS at 07:05

## 2022-11-17 RX ADMIN — PIPERACILLIN AND TAZOBACTAM 3.38 G: 36; 4.5 INJECTION, POWDER, FOR SOLUTION INTRAVENOUS at 09:07

## 2022-11-17 RX ADMIN — SODIUM CHLORIDE 500 ML: 0.9 INJECTION, SOLUTION INTRAVENOUS at 06:55

## 2022-11-17 RX ADMIN — ACETAMINOPHEN 650 MG: 325 TABLET ORAL at 09:06

## 2022-11-17 NOTE — ED NOTES
Patient grossly incontinent of urine, given clean linens and hygiene care        Karrie Christine RN  11/17/22 6892

## 2022-11-17 NOTE — ED PROVIDER NOTES
History  Chief Complaint   Patient presents with   • Shortness of Breath     Pt placed on hospice due to Heart Failure, tonight patient wishing to revoke hospice care and be treated for SOB        History provided by:  Relative  Shortness of Breath  Severity:  Moderate  Onset quality:  Gradual  Duration:  2 days  Timing:  Constant  Progression:  Worsening  Chronicity:  Recurrent  Context: not activity and not URI    Relieved by:  Nothing  Worsened by: Activity, coughing, deep breathing, exertion and movement  Ineffective treatments:  None tried  Associated symptoms: claudication, cough and rash    Associated symptoms: no abdominal pain, no chest pain, no diaphoresis, no fever, no headaches, no sore throat, no sputum production, no syncope, no vomiting and no wheezing      61-year-old male  History of coronary artery disease, HFrEF last echo 10-15%, s/p STEMI in April 2022, peripheral artery disease presents to the emergency department with worsening shortness of breath  The patient was on hospice care until this morning when he revoked hospice and reinstated full code status  Worsening shortness of breath x 48 hours  Prior to Admission Medications   Prescriptions Last Dose Informant Patient Reported?  Taking?   acetaminophen (TYLENOL) 325 mg tablet   No No   Sig: Take 2 tablets (650 mg total) by mouth every 6 (six) hours as needed for mild pain, headaches or fever   atorvastatin (LIPITOR) 80 mg tablet   Yes No   Sig: Take 80 mg by mouth daily   benzonatate (TESSALON PERLES) 100 mg capsule   No No   Sig: Take 1 capsule (100 mg total) by mouth 3 (three) times a day as needed for cough   Patient not taking: Reported on 9/5/2022   clopidogrel (PLAVIX) 75 mg tablet   Yes No   Sig: Take 75 mg by mouth daily   docusate sodium (COLACE) 100 mg capsule   No No   Sig: Take 1 capsule (100 mg total) by mouth 2 (two) times a day   Patient not taking: Reported on 9/5/2022   gabapentin (NEURONTIN) 600 MG tablet   Yes No   Sig: Take 600 mg by mouth daily as needed   metoprolol succinate (TOPROL-XL) 25 mg 24 hr tablet   Yes No   Sig: Take 25 mg by mouth daily   polyethylene glycol (MIRALAX) 17 g packet   No No   Sig: Take 17 g by mouth daily   Patient not taking: Reported on 9/5/2022   potassium chloride (K-DUR,KLOR-CON) 20 mEq tablet   No No   Sig: Take 1 tablet (20 mEq total) by mouth 2 (two) times a day   torsemide (DEMADEX) 20 mg tablet   No No   Sig: Take 2 tablets (40 mg total) by mouth daily after breakfast AND 1 tablet (20 mg total) daily after lunch  Patient not taking: Reported on 9/5/2022   warfarin (COUMADIN) 2 5 mg tablet   No No   Sig: Take 1 tablet (2 5 mg total) by mouth daily      Facility-Administered Medications: None       Past Medical History:   Diagnosis Date   • CHF (congestive heart failure) (HCC)    • Coronary artery disease    • COVID    • MI, old        Past Surgical History:   Procedure Laterality Date   • ABDOMINAL SURGERY     • CORONARY ANGIOPLASTY WITH STENT PLACEMENT  04/10/2022    x 2       History reviewed  No pertinent family history  I have reviewed and agree with the history as documented  E-Cigarette/Vaping   • E-Cigarette Use Never User      E-Cigarette/Vaping Substances     Social History     Tobacco Use   • Smoking status: Former     Packs/day: 0 25     Types: Cigarettes   • Smokeless tobacco: Never   Vaping Use   • Vaping Use: Never used   Substance Use Topics   • Alcohol use: Not Currently   • Drug use: Not Currently       Review of Systems   Constitutional: Positive for activity change and appetite change  Negative for diaphoresis and fever  HENT: Negative for sore throat  Respiratory: Positive for cough, chest tightness and shortness of breath  Negative for sputum production and wheezing  Cardiovascular: Positive for claudication and leg swelling  Negative for chest pain, palpitations and syncope     Gastrointestinal: Negative for abdominal distention, abdominal pain, diarrhea, nausea and vomiting  Musculoskeletal: Positive for arthralgias, gait problem and myalgias  Skin: Positive for color change, pallor and rash  Negative for wound  Neurological: Negative for dizziness, syncope, weakness, light-headedness and headaches  Hematological: Bruises/bleeds easily  Psychiatric/Behavioral: Positive for confusion  The patient is nervous/anxious  All other systems reviewed and are negative  Physical Exam  Physical Exam  Vitals and nursing note reviewed  Constitutional:       General: He is in acute distress  Appearance: He is ill-appearing  He is not toxic-appearing  HENT:      Head: Normocephalic and atraumatic  Eyes:      Extraocular Movements: Extraocular movements intact  Pupils: Pupils are equal, round, and reactive to light  Cardiovascular:      Rate and Rhythm: Regular rhythm  Tachycardia present  Pulses: Decreased pulses  Heart sounds: No murmur heard  Comments: Unable to detect pulses in the right foot with Doppler  Soft pulses detected in the left foot using Doppler  Pulmonary:      Effort: Tachypnea and respiratory distress present  Breath sounds: Examination of the right-middle field reveals rhonchi and rales  Examination of the left-middle field reveals rhonchi and rales  Examination of the right-lower field reveals rhonchi and rales  Examination of the left-lower field reveals rhonchi and rales  Decreased breath sounds, rhonchi and rales present  No wheezing  Chest:      Chest wall: No tenderness  Abdominal:      General: Bowel sounds are normal       Palpations: Abdomen is soft  Tenderness: There is no abdominal tenderness  There is no guarding or rebound  Musculoskeletal:      Cervical back: Neck supple  Right lower leg: Tenderness present  Edema present  Left lower leg: No tenderness  Edema present  Skin:     General: Skin is dry  Capillary Refill: Capillary refill takes more than 3 seconds  Coloration: Skin is cyanotic and pale  Findings: Ecchymosis present  Comments: Bilateral lower extremities are cool  The right lower extremity is cool, mottled to the thigh   Neurological:      Mental Status: He is alert  He is disoriented  Cranial Nerves: No cranial nerve deficit  Motor: No weakness  Vital Signs  ED Triage Vitals   Temperature Pulse Respirations Blood Pressure SpO2   11/17/22 0506 11/17/22 0506 11/17/22 0506 11/17/22 0508 11/17/22 0506   97 9 °F (36 6 °C) (!) 117 20 119/72 98 %      Temp Source Heart Rate Source Patient Position - Orthostatic VS BP Location FiO2 (%)   11/17/22 0506 11/17/22 0506 -- -- --   Temporal Monitor         Pain Score       --                  Vitals:    11/17/22 0506 11/17/22 0508   BP:  119/72   Pulse: (!) 117      ED Medications  Medications   dextrose 50 % IV solution 25 mL (has no administration in time range)   sodium chloride 0 9 % bolus 500 mL (has no administration in time range)     Diagnostic Studies  Results Reviewed     Procedure Component Value Units Date/Time    Lactic acid, plasma [[id number]]  (Abnormal) Collected: 11/17/22 0520    Lab Status: Final result Specimen: Blood from Arm, Right Updated: 11/17/22 0646     LACTIC ACID 20 2 mmol/L     Narrative:      Result may be elevated if tourniquet was used during collection      Lactic acid 2 Hours [[id number]]     Lab Status: No result Specimen: Blood     Basic metabolic panel [[id number]]  (Abnormal) Collected: 11/17/22 0520    Lab Status: Final result Specimen: Blood from Arm, Right Updated: 11/17/22 0646     Sodium 139 mmol/L      Potassium 5 3 mmol/L      Chloride 96 mmol/L      CO2 11 mmol/L      ANION GAP 32 mmol/L      BUN 39 mg/dL      Creatinine 1 67 mg/dL      Glucose 33 mg/dL      Calcium 9 0 mg/dL      eGFR 45 ml/min/1 73sq m     Narrative:      [place] guidelines for Chronic Kidney Disease (CKD):   •  Stage 1 with normal or high GFR (GFR > 90 mL/min/1 73 square meters)  •  Stage 2 Mild CKD (GFR = 60-89 mL/min/1 73 square meters)  •  Stage 3A Moderate CKD (GFR = 45-59 mL/min/1 73 square meters)  •  Stage 3B Moderate CKD (GFR = 30-44 mL/min/1 73 square meters)  •  Stage 4 Severe CKD (GFR = 15-29 mL/min/1 73 square meters)  •  Stage 5 End Stage CKD (GFR <15 mL/min/1 73 square meters)  Note: GFR calculation is accurate only with a steady state creatinine    Hepatic function panel [[id number]]  (Abnormal) Collected: 11/17/22 0520    Lab Status: Final result Specimen: Blood from Arm, Right Updated: 11/17/22 0645     Total Bilirubin 2 90 mg/dL      Bilirubin, Direct 2 17 mg/dL      Alkaline Phosphatase 390 U/L       U/L      ALT 98 U/L      Total Protein 6 4 g/dL      Albumin 2 9 g/dL     NT-BNP PRO [[id number]]  (Abnormal) Collected: 11/17/22 0520    Lab Status: Final result Specimen: Blood from Arm, Right Updated: 11/17/22 0631     NT-proBNP 9,966 pg/mL     Magnesium [[id number]]  (Normal) Collected: 11/17/22 0520    Lab Status: Final result Specimen: Blood from Arm, Right Updated: 11/17/22 0631     Magnesium 2 5 mg/dL     CKMB [[id number]]  (Abnormal) Collected: 11/17/22 0520    Lab Status: Final result Specimen: Blood from Arm, Right Updated: 11/17/22 0631     CK-MB Index 10 9 %      CK-MB 26 2 ng/mL     Protime-INR [[id number]]  (Abnormal) Collected: 11/17/22 0520    Lab Status: Final result Specimen: Blood from Arm, Right Updated: 11/17/22 0618     Protime 43 3 seconds      INR 4 59    HS Troponin I 2hr [[id number]]     Lab Status: No result Specimen: Blood     HS Troponin 0hr (reflex protocol) [[id number]]  (Abnormal) Collected: 11/17/22 0520    Lab Status: Final result Specimen: Blood from Arm, Right Updated: 11/17/22 0613     hs TnI 0hr 1,728 ng/L     Procalcitonin [[id number]]  (Abnormal) Collected: 11/17/22 0520    Lab Status: Final result Specimen: Blood from Arm, Right Updated: 11/17/22 0612     Procalcitonin 22 97 ng/ml     D-dimer, quantitative [[id number]]  (Abnormal) Collected: 11/17/22 0520    Lab Status: Final result Specimen: Blood from Arm, Right Updated: 11/17/22 0603     D-Dimer, Quant >20 00 ug/ml FEU     Narrative: In the evaluation for possible pulmonary embolism, in the appropriate (Well's Score of 4 or less) patient, the age adjusted d-dimer cutoff for this patient can be calculated as:    Age x 0 01 (in ug/mL) for Age-adjusted D-dimer exclusion threshold for a patient over 50 years  CK (with reflex to MB) [[id number]]  (Normal) Collected: 11/17/22 0520    Lab Status: Final result Specimen: Blood from Arm, Right Updated: 11/17/22 0601     Total  U/L     Blood gas, venous [[id number]]  (Abnormal) Collected: 11/17/22 0520    Lab Status: Final result Specimen: Blood from Arm, Right Updated: 11/17/22 0531     pH, Bryce 7 164     pCO2, Bryce 24 4 mm Hg      pO2, Bryce 90 2 mm Hg      HCO3, Bryce 8 6 mmol/L      Base Excess, Bryce -18 4 mmol/L      O2 Content, Bryce 14 7 ml/dL      O2 HGB, VENOUS 91 7 %     CBC and differential [[id number]]  (Abnormal) Collected: 11/17/22 0520    Lab Status: Final result Specimen: Blood from Arm, Right Updated: 11/17/22 0530     WBC 16 03 Thousand/uL      RBC 4 00 Million/uL      Hemoglobin 10 0 g/dL      Hematocrit 36 9 %      MCV 92 fL      MCH 25 0 pg      MCHC 27 1 g/dL      RDW 25 2 %      MPV 9 4 fL      Platelets 181 Thousands/uL      nRBC 1 /100 WBCs      Neutrophils Relative 87 %      Immat GRANS % 2 %      Lymphocytes Relative 5 %      Monocytes Relative 6 %      Eosinophils Relative 0 %      Basophils Relative 0 %      Neutrophils Absolute 13 84 Thousands/µL      Immature Grans Absolute 0 26 Thousand/uL      Lymphocytes Absolute 0 87 Thousands/µL      Monocytes Absolute 1 03 Thousand/µL      Eosinophils Absolute 0 00 Thousand/µL      Basophils Absolute 0 03 Thousands/µL     Blood culture #1 [[id number]] Collected: 11/17/22 0520    Lab Status:  In process Specimen: Blood from Arm, Right Updated: 11/17/22 2104 Blood culture #2 [[id number]] Collected: 11/17/22 0520    Lab Status: In process Specimen: Blood from Arm, Left Updated: 11/17/22 0526             XR chest 1 view portable    (Results Pending)          Procedures  ECG 12 Lead Documentation Only    Date/Time: 11/17/2022 6:55 AM  Performed by: Rayna Henriquez DO  Authorized by: Rayna Henriquez DO     Indications / Diagnosis:  Shortness of breath  ECG reviewed by me, the ED Provider: yes    Patient location:  ED  Previous ECG:     Previous ECG:  Unavailable  Interpretation:     Interpretation: abnormal    Rate:     ECG rate:  112    ECG rate assessment: tachycardic    Rhythm:     Rhythm: sinus tachycardia    Ectopy:     Ectopy: PAC    QRS:     QRS axis:  Normal    QRS intervals:  Normal  ST segments:     ST segments:  Normal  T waves:     T waves: normal      CriticalCare Time  Performed by: Rayna Henriquez DO  Authorized by: Rayna Henriquez DO     Critical care provider statement:     Critical care time (minutes):  50    Critical care was necessary to treat or prevent imminent or life-threatening deterioration of the following conditions:  Sepsis, renal failure, metabolic crisis and cardiac failure    Critical care was time spent personally by me on the following activities:  Blood draw for specimens, obtaining history from patient or surrogate, development of treatment plan with patient or surrogate, evaluation of patient's response to treatment, examination of patient, review of old charts, re-evaluation of patient's condition, ordering and review of radiographic studies, ordering and review of laboratory studies and ordering and performing treatments and interventions      ED Course  ED Course as of 11/17/22 0823   Thu Nov 17, 2022   0531 Leukocytosis 16  Hemoglobin is stable   6160 Metabolic acidosis noted on the VBG   0624 D-dimer greater than 20, troponin 1700  ECG without acute ischemic changes  9266 BMP not resulted yet   Given severity of the patient's condition at this point, can proceed with CT PE, CTA w/ runoff without resulted BMP, renal function  0657 Glucose 33  Will administer a dose of D50  Lactic acid 20  Will administer a small bolus of IV fluids  Will not administer the total 30 mL/kg given the patient's hx of CHF       0704 The case was signed out to Dr Libertad To  If the patient requires transfer to a tertiary care center, the patient/family is choosing [place], [place]       SBIRT 22yo+    Flowsheet Row Most Recent Value   SBIRT (25 yo +)    In order to provide better care to our patients, we are screening all of our patients for alcohol and drug use  Would it be okay to ask you these screening questions? No Filed at: 11/17/2022 0508        MDM    Disposition  Final diagnoses:   None     ED Disposition     None      Follow-up Information    None         Patient's Medications   Discharge Prescriptions    No medications on file       No discharge procedures on file      PDMP Review       Value Time User    PDMP Reviewed  Yes 8/6/2022  4:01 PM Dora Hughes MD          ED Provider  Electronically Signed by           Maciel Dacosta DO  11/17/22 0829

## 2022-11-17 NOTE — ED PROCEDURE NOTE
PROCEDURE  CriticalCare Time  Performed by: Eula Somers MD  Authorized by: Eula Somers MD     Critical care provider statement:     Critical care time (minutes):  45    Critical care was necessary to treat or prevent imminent or life-threatening deterioration of the following conditions:  Circulatory failure and metabolic crisis    Critical care was time spent personally by me on the following activities:  Interpretation of cardiac output measurements, ordering and performing treatments and interventions, ordering and review of laboratory studies, ordering and review of radiographic studies, re-evaluation of patient's condition, review of old charts, examination of patient, discussions with consultants, development of treatment plan with patient or surrogate, obtaining history from patient or surrogate and evaluation of patient's response to treatment    I assumed direction of critical care for this patient from another provider in my specialty: no           Eula Somers MD  11/17/22 1011

## 2022-11-17 NOTE — EMTALA/ACUTE CARE TRANSFER
[address]  [facility] [address]  Dept: [phone]      EMTALA TRANSFER CONSENT    NAME Kavita Jackman                                         1965                              MRN [id number]    I have been informed of my rights regarding examination, treatment, and transfer   by Dr David Duran MD    Benefits: Specialized equipment and/or services available at the receiving facility (Include comment)________________________    Risks: Potential for delay in receiving treatment, Potential deterioration of medical condition, Loss of IV, Possible worsening of condition or death during transfer, Increased discomfort during transfer      Consent for Transfer:  I acknowledge that my medical condition has been evaluated and explained to me by the emergency department physician or other qualified medical person and/or my attending physician, who has recommended that I be transferred to the service of  Accepting Physician: Dr Judd oBurne [facility] at [address] [place] [address] Name, [address] : [facility]  The above potential benefits of such transfer, the potential risks associated with such transfer, and the probable risks of not being transferred have been explained to me, and I fully understand them  The doctor has explained that, in my case, the benefits of transfer outweigh the risks  I agree to be transferred  I authorize the performance of emergency medical procedures and treatments upon me in both transit and upon arrival at the receiving facility  Additionally, I authorize the release of any and all medical records to the receiving facility and request they be transported with me, if possible  I understand that the safest mode of transportation during a medical emergency is an ambulance and that the Hospital advocates the use of this mode of transport   Risks of traveling to the receiving facility by car, including absence of medical control, life sustaining equipment, such as oxygen, and medical personnel has been explained to me and I fully understand them  (SUMAYA CORRECT BOX BELOW)  [  ]  I consent to the stated transfer and to be transported by ambulance/helicopter  [  ]  I consent to the stated transfer, but refuse transportation by ambulance and accept full responsibility for my transportation by car  I understand the risks of non-ambulance transfers and I exonerate the Hospital and its staff from any deterioration in my condition that results from this refusal     X___________________________________________    DATE  22  TIME________  Signature of patient or legally responsible individual signing on patient behalf           RELATIONSHIP TO PATIENT_________________________          Provider Certification    NAME Jose Gordon                                         1965                              MRN [id number]    A medical screening exam was performed on the above named patient  Based on the examination:    Condition Necessitating Transfer The primary encounter diagnosis was Arterial occlusion, lower extremity ([facility] [address] )  Diagnoses of Coronary artery disease involving native coronary artery of native heart without angina pectoris and Chest pain were also pertinent to this visit      Patient Condition: The patient has been stabilized such that within reasonable medical probability, no material deterioration of the patient condition or the condition of the unborn child(delgado) is likely to result from the transfer    Reason for Transfer: Level of Care needed not available at this facility    Transfer Requirements: Facility [facility]   · Space available and qualified personnel available for treatment as acknowledged by    · Agreed to accept transfer and to provide appropriate medical treatment as acknowledged by       Dr Neha Chen Surgery  · Appropriate medical records of the examination and treatment of the patient are provided at the time of transfer   STAFF INITIAL WHEN COMPLETED _______  · Transfer will be performed by qualified personnel from    and appropriate transfer equipment as required, including the use of necessary and appropriate life support measures  Provider Certification: I have examined the patient and explained the following risks and benefits of being transferred/refusing transfer to the patient/family:  General risk, such as traffic hazards, adverse weather conditions, rough terrain or turbulence, possible failure of equipment (including vehicle or aircraft), or consequences of actions of persons outside the control of the transport personnel, Unanticipated needs of medical equipment and personnel during transport, Risk of worsening condition, The possibility of a transport vehicle being unavailable      Based on these reasonable risks and benefits to the patient and/or the unborn child(delgado), and based upon the information available at the time of the patient’s examination, I certify that the medical benefits reasonably to be expected from the provision of appropriate medical treatments at another medical facility outweigh the increasing risks, if any, to the individual’s medical condition, and in the case of labor to the unborn child, from effecting the transfer      X____________________________________________ DATE 11/17/22        TIME_______      ORIGINAL - SEND TO MEDICAL RECORDS   COPY - SEND WITH PATIENT DURING TRANSFER

## 2022-11-18 LAB
ATRIAL RATE: 106 BPM
ATRIAL RATE: 109 BPM
ATRIAL RATE: 112 BPM
ATRIAL RATE: 112 BPM
P AXIS: 42 DEGREES
P AXIS: 45 DEGREES
P AXIS: 63 DEGREES
P AXIS: 63 DEGREES
PR INTERVAL: 152 MS
PR INTERVAL: 152 MS
PR INTERVAL: 160 MS
PR INTERVAL: 160 MS
QRS AXIS: 66 DEGREES
QRS AXIS: 85 DEGREES
QRS AXIS: 88 DEGREES
QRS AXIS: 88 DEGREES
QRSD INTERVAL: 104 MS
QRSD INTERVAL: 104 MS
QRSD INTERVAL: 110 MS
QRSD INTERVAL: 110 MS
QT INTERVAL: 352 MS
QT INTERVAL: 354 MS
QT INTERVAL: 354 MS
QT INTERVAL: 368 MS
QTC INTERVAL: 474 MS
QTC INTERVAL: 483 MS
QTC INTERVAL: 483 MS
QTC INTERVAL: 488 MS
T WAVE AXIS: -19 DEGREES
T WAVE AXIS: -19 DEGREES
T WAVE AXIS: -30 DEGREES
T WAVE AXIS: -51 DEGREES
VENTRICULAR RATE: 106 BPM
VENTRICULAR RATE: 109 BPM
VENTRICULAR RATE: 112 BPM
VENTRICULAR RATE: 112 BPM

## 2022-11-20 LAB
BACTERIA BLD CULT: NORMAL
BACTERIA BLD CULT: NORMAL

## 2022-11-22 LAB
BACTERIA BLD CULT: NORMAL
BACTERIA BLD CULT: NORMAL